# Patient Record
Sex: FEMALE | Race: WHITE | NOT HISPANIC OR LATINO | Employment: FULL TIME | URBAN - METROPOLITAN AREA
[De-identification: names, ages, dates, MRNs, and addresses within clinical notes are randomized per-mention and may not be internally consistent; named-entity substitution may affect disease eponyms.]

---

## 2019-02-26 ENCOUNTER — OFFICE VISIT (OUTPATIENT)
Dept: URGENT CARE | Facility: CLINIC | Age: 39
End: 2019-02-26
Payer: COMMERCIAL

## 2019-02-26 VITALS
RESPIRATION RATE: 19 BRPM | HEIGHT: 60 IN | OXYGEN SATURATION: 98 % | HEART RATE: 105 BPM | WEIGHT: 120 LBS | TEMPERATURE: 98 F | SYSTOLIC BLOOD PRESSURE: 133 MMHG | DIASTOLIC BLOOD PRESSURE: 68 MMHG | BODY MASS INDEX: 23.56 KG/M2

## 2019-02-26 DIAGNOSIS — J10.1 INFLUENZA A: Primary | ICD-10-CM

## 2019-02-26 LAB
CTP QC/QA: YES
FLUAV AG NPH QL: POSITIVE
FLUBV AG NPH QL: NEGATIVE

## 2019-02-26 PROCEDURE — 99203 PR OFFICE/OUTPT VISIT, NEW, LEVL III, 30-44 MIN: ICD-10-PCS | Mod: S$GLB,,, | Performed by: PHYSICIAN ASSISTANT

## 2019-02-26 PROCEDURE — 99203 OFFICE O/P NEW LOW 30 MIN: CPT | Mod: S$GLB,,, | Performed by: PHYSICIAN ASSISTANT

## 2019-02-26 PROCEDURE — 87804 INFLUENZA ASSAY W/OPTIC: CPT | Mod: QW,S$GLB,, | Performed by: PHYSICIAN ASSISTANT

## 2019-02-26 PROCEDURE — 87804 POCT INFLUENZA A/B: ICD-10-PCS | Mod: 59,QW,S$GLB, | Performed by: PHYSICIAN ASSISTANT

## 2019-02-26 RX ORDER — PROPRANOLOL HYDROCHLORIDE 120 MG/1
CAPSULE, EXTENDED RELEASE ORAL
Refills: 6 | COMMUNITY
Start: 2019-01-08 | End: 2024-01-22

## 2019-02-26 RX ORDER — OSELTAMIVIR PHOSPHATE 75 MG/1
75 CAPSULE ORAL 2 TIMES DAILY
Qty: 10 CAPSULE | Refills: 0 | Status: SHIPPED | OUTPATIENT
Start: 2019-02-26 | End: 2019-03-03

## 2019-02-26 RX ORDER — CARBAMAZEPINE 200 MG/1
TABLET, EXTENDED RELEASE ORAL
Refills: 6 | COMMUNITY
Start: 2019-01-03

## 2019-02-26 RX ORDER — ONDANSETRON HYDROCHLORIDE 8 MG/1
8 TABLET, FILM COATED ORAL EVERY 8 HOURS PRN
Qty: 15 TABLET | Refills: 0 | Status: SHIPPED | OUTPATIENT
Start: 2019-02-26 | End: 2022-03-06

## 2019-02-26 NOTE — PROGRESS NOTES
Subjective:       Patient ID: Marilu Frausto is a 38 y.o. female.    Vitals:  height is 5' (1.524 m) and weight is 54.4 kg (120 lb). Her oral temperature is 97.8 °F (36.6 °C). Her blood pressure is 133/68 and her pulse is 105. Her respiration is 19 and oxygen saturation is 98%.     Chief Complaint: URI (congestion, otalgia, fever, exposure to the flu)    URI    This is a new problem. The current episode started yesterday. The problem has been gradually worsening. The maximum temperature recorded prior to her arrival was 101 - 101.9 F. The fever has been present for 1 to 2 days. Associated symptoms include ear pain. Pertinent negatives include no chest pain, congestion, coughing, diarrhea, dysuria, headaches, nausea, rash, sore throat or vomiting. Treatments tried: Tylenol cold and flu. The treatment provided no relief.       Constitution: Positive for fatigue and fever. Negative for chills.   HENT: Positive for ear pain. Negative for congestion and sore throat.    Neck: Negative for painful lymph nodes.   Cardiovascular: Negative for chest pain and leg swelling.   Eyes: Negative for double vision and blurred vision.   Respiratory: Negative for cough and shortness of breath.    Gastrointestinal: Negative for nausea, vomiting and diarrhea.   Genitourinary: Negative for dysuria, frequency, urgency and history of kidney stones.   Musculoskeletal: Positive for muscle ache. Negative for joint pain, joint swelling and muscle cramps.   Skin: Negative for color change, pale, rash, erythema and bruising.   Allergic/Immunologic: Negative for seasonal allergies.   Neurological: Negative for dizziness, history of vertigo, light-headedness, passing out and headaches.   Hematologic/Lymphatic: Negative for swollen lymph nodes.   Psychiatric/Behavioral: Negative for nervous/anxious, sleep disturbance and depression. The patient is not nervous/anxious.        Objective:      Physical Exam   Constitutional: She is oriented to  person, place, and time. Vital signs are normal. She appears well-developed and well-nourished. She appears ill. No distress.   HENT:   Head: Normocephalic and atraumatic.   Right Ear: Hearing, external ear and ear canal normal. Tympanic membrane is bulging. A middle ear effusion (clear) is present.   Left Ear: Hearing, external ear and ear canal normal. Tympanic membrane is bulging. A middle ear effusion (clear) is present.   Nose: Mucosal edema present. Right sinus exhibits no maxillary sinus tenderness and no frontal sinus tenderness. Left sinus exhibits no maxillary sinus tenderness and no frontal sinus tenderness.   Mouth/Throat: Uvula is midline and oropharynx is clear and moist. No oropharyngeal exudate, posterior oropharyngeal edema or posterior oropharyngeal erythema.   Eyes: Conjunctivae, EOM and lids are normal. Right eye exhibits no discharge. Left eye exhibits no discharge.   Neck: Normal range of motion. Neck supple.   Cardiovascular: Normal rate, regular rhythm and normal heart sounds. Exam reveals no gallop and no friction rub.   No murmur heard.  Pulmonary/Chest: Effort normal and breath sounds normal. No stridor. No respiratory distress. She has no decreased breath sounds. She has no wheezes. She has no rhonchi. She has no rales.   Musculoskeletal: Normal range of motion.   Lymphadenopathy:        Head (right side): No submandibular and no tonsillar adenopathy present.        Head (left side): No submandibular and no tonsillar adenopathy present.   Neurological: She is alert and oriented to person, place, and time.   Skin: Skin is warm and dry. No rash noted. She is not diaphoretic. No erythema. No pallor.   Psychiatric: She has a normal mood and affect. Her behavior is normal.   Nursing note and vitals reviewed.      Assessment:       1. Influenza A        Office Visit on 02/26/2019   Component Date Value Ref Range Status    Rapid Influenza A Ag 02/26/2019 Positive* Negative Final    Rapid  Influenza B Ag 02/26/2019 Negative  Negative Final     Acceptable 02/26/2019 Yes   Final     Plan:         Influenza A  -     POCT Influenza A/B  -     oseltamivir (TAMIFLU) 75 MG capsule; Take 1 capsule (75 mg total) by mouth 2 (two) times daily. for 5 days  Dispense: 10 capsule; Refill: 0  -     ondansetron (ZOFRAN) 8 MG tablet; Take 1 tablet (8 mg total) by mouth every 8 (eight) hours as needed for Nausea.  Dispense: 15 tablet; Refill: 0      Patient Instructions   Take tamiflu as prescribed. Take zofran if you develop nausea.    Please follow up with your primary care provider within 2-5 days if your signs and symptoms have not resolved or worsen.     If your condition worsens or fails to improve we recommend that you receive another evaluation at the emergency room immediately or contact your primary medical clinic to discuss your concerns.   You must understand that you have received an Urgent Care treatment only and that you may be released before all of your medical problems are known or treated. You, the patient, will arrange for follow up care as instructed.       -Rest and stay hydrated.  -Tylenol every 4 hours OR ibuprofen every 6 hours as needed for pain/fever.  -Flonase OTC or Nasacort OTC for nasal congestion.  -Warm face compresses to help with facial sinus pain/pressure.  -Simple foods like chicken noodle soup.  -Delsym helps with coughing at night  -Zyrtec/Claritin during the day & Benadryl at night may help with allergies.    Please follow up with your primary care provider within 2-5 days if your signs and symptoms have not resolved or worsen.     If your condition worsens or fails to improve we recommend that you receive another evaluation at the emergency room immediately or contact your primary medical clinic to discuss your concerns.   You must understand that you have received an Urgent Care treatment only and that you may be released before all of your medical problems are  known or treated. You, the patient, will arrange for follow up care as instructed.         Influenza (Adult)    Influenza is also called the flu. It is a viral illness that affects the air passages of your lungs. It is different from the common cold. The flu can easily be passed from one to person to another. It may be spread through the air by coughing and sneezing. Or it can be spread by touching the sick person and then touching your own eyes, nose, or mouth.  The flu starts 1 to 3 days after you are exposed to the flu virus. It may last for 1 to 2 weeks but many people feel tired or fatigued for many weeks afterward. You usually dont need to take antibiotics unless you have a complication. This might be an ear or sinus infection or pneumonia.  Symptoms of the flu may be mild or severe. They can include extreme tiredness (wanting to stay in bed all day), chills, fevers, muscle aches, soreness with eye movement, headache, and a dry, hacking cough.  Home care  Follow these guidelines when caring for yourself at home:  · Avoid being around cigarette smoke, whether yours or other peoples.  · Acetaminophen or ibuprofen will help ease your fever, muscle aches, and headache. Dont give aspirin to anyone younger than 18 who has the flu. Aspirin can harm the liver.  · Nausea and loss of appetite are common with the flu. Eat light meals. Drink 6 to 8 glasses of liquids every day. Good choices are water, sport drinks, soft drinks without caffeine, juices, tea, and soup. Extra fluids will also help loosen secretions in your nose and lungs.  · Over-the-counter cold medicines will not make the flu go away faster. But the medicines may help with coughing, sore throat, and congestion in your nose and sinuses. Dont use a decongestant if you have high blood pressure.  · Stay home until your fever has been gone for at least 24 hours without using medicine to reduce fever.  Follow-up care  Follow up with your healthcare provider,  or as advised, if you are not getting better over the next week.  If you are age 65 or older, talk with your provider about getting a pneumococcal vaccine every 5 years. You should also get this vaccine if you have chronic asthma or COPD. All adults should get a flu vaccine every fall. Ask your provider about this.  When to seek medical advice  Call your healthcare provider right away if any of these occur:  · Cough with lots of colored mucus (sputum) or blood in your mucus  · Chest pain, shortness of breath, wheezing, or trouble breathing  · Severe headache, or face, neck, or ear pain  · New rash with fever  · Fever of 100.4°F (38°C) or higher, or as directed by your healthcare provider  · Confusion, behavior change, or seizure  · Severe weakness or dizziness  · You get a new fever or cough after getting better for a few days  Date Last Reviewed: 1/1/2017  © 2537-3840 [employer]. [address] [place] [address], [place] [address]. All rights reserved. This information is not intended as a substitute for professional medical care. Always follow your healthcare professional's instructions.

## 2019-02-26 NOTE — PATIENT INSTRUCTIONS
Take tamiflu as prescribed. Take zofran if you develop nausea.    Please follow up with your primary care provider within 2-5 days if your signs and symptoms have not resolved or worsen.     If your condition worsens or fails to improve we recommend that you receive another evaluation at the emergency room immediately or contact your primary medical clinic to discuss your concerns.   You must understand that you have received an Urgent Care treatment only and that you may be released before all of your medical problems are known or treated. You, the patient, will arrange for follow up care as instructed.       -Rest and stay hydrated.  -Tylenol every 4 hours OR ibuprofen every 6 hours as needed for pain/fever.  -Flonase OTC or Nasacort OTC for nasal congestion.  -Warm face compresses to help with facial sinus pain/pressure.  -Simple foods like chicken noodle soup.  -Delsym helps with coughing at night  -Zyrtec/Claritin during the day & Benadryl at night may help with allergies.    Please follow up with your primary care provider within 2-5 days if your signs and symptoms have not resolved or worsen.     If your condition worsens or fails to improve we recommend that you receive another evaluation at the emergency room immediately or contact your primary medical clinic to discuss your concerns.   You must understand that you have received an Urgent Care treatment only and that you may be released before all of your medical problems are known or treated. You, the patient, will arrange for follow up care as instructed.         Influenza (Adult)    Influenza is also called the flu. It is a viral illness that affects the air passages of your lungs. It is different from the common cold. The flu can easily be passed from one to person to another. It may be spread through the air by coughing and sneezing. Or it can be spread by touching the sick person and then touching your own eyes, nose, or mouth.  The flu starts 1 to 3  days after you are exposed to the flu virus. It may last for 1 to 2 weeks but many people feel tired or fatigued for many weeks afterward. You usually dont need to take antibiotics unless you have a complication. This might be an ear or sinus infection or pneumonia.  Symptoms of the flu may be mild or severe. They can include extreme tiredness (wanting to stay in bed all day), chills, fevers, muscle aches, soreness with eye movement, headache, and a dry, hacking cough.  Home care  Follow these guidelines when caring for yourself at home:  · Avoid being around cigarette smoke, whether yours or other peoples.  · Acetaminophen or ibuprofen will help ease your fever, muscle aches, and headache. Dont give aspirin to anyone younger than 18 who has the flu. Aspirin can harm the liver.  · Nausea and loss of appetite are common with the flu. Eat light meals. Drink 6 to 8 glasses of liquids every day. Good choices are water, sport drinks, soft drinks without caffeine, juices, tea, and soup. Extra fluids will also help loosen secretions in your nose and lungs.  · Over-the-counter cold medicines will not make the flu go away faster. But the medicines may help with coughing, sore throat, and congestion in your nose and sinuses. Dont use a decongestant if you have high blood pressure.  · Stay home until your fever has been gone for at least 24 hours without using medicine to reduce fever.  Follow-up care  Follow up with your healthcare provider, or as advised, if you are not getting better over the next week.  If you are age 65 or older, talk with your provider about getting a pneumococcal vaccine every 5 years. You should also get this vaccine if you have chronic asthma or COPD. All adults should get a flu vaccine every fall. Ask your provider about this.  When to seek medical advice  Call your healthcare provider right away if any of these occur:  · Cough with lots of colored mucus (sputum) or blood in your mucus  · Chest  pain, shortness of breath, wheezing, or trouble breathing  · Severe headache, or face, neck, or ear pain  · New rash with fever  · Fever of 100.4°F (38°C) or higher, or as directed by your healthcare provider  · Confusion, behavior change, or seizure  · Severe weakness or dizziness  · You get a new fever or cough after getting better for a few days  Date Last Reviewed: 1/1/2017  © 4737-0481 [employer]. [address] [place] [address]. All rights reserved. This information is not intended as a substitute for professional medical care. Always follow your healthcare professional's instructions.

## 2019-08-07 ENCOUNTER — OFFICE VISIT (OUTPATIENT)
Dept: URGENT CARE | Facility: CLINIC | Age: 39
End: 2019-08-07
Payer: COMMERCIAL

## 2019-08-07 VITALS
OXYGEN SATURATION: 97 % | HEART RATE: 69 BPM | DIASTOLIC BLOOD PRESSURE: 78 MMHG | SYSTOLIC BLOOD PRESSURE: 116 MMHG | WEIGHT: 111 LBS | BODY MASS INDEX: 21.68 KG/M2 | RESPIRATION RATE: 16 BRPM | TEMPERATURE: 98 F

## 2019-08-07 DIAGNOSIS — R05.9 COUGH: ICD-10-CM

## 2019-08-07 DIAGNOSIS — J06.9 ACUTE URI: Primary | ICD-10-CM

## 2019-08-07 PROCEDURE — 96372 PR INJECTION,THERAP/PROPH/DIAG2ST, IM OR SUBCUT: ICD-10-PCS | Mod: S$GLB,,, | Performed by: NURSE PRACTITIONER

## 2019-08-07 PROCEDURE — 99213 PR OFFICE/OUTPT VISIT, EST, LEVL III, 20-29 MIN: ICD-10-PCS | Mod: 25,S$GLB,, | Performed by: NURSE PRACTITIONER

## 2019-08-07 PROCEDURE — 96372 THER/PROPH/DIAG INJ SC/IM: CPT | Mod: S$GLB,,, | Performed by: NURSE PRACTITIONER

## 2019-08-07 PROCEDURE — 99213 OFFICE O/P EST LOW 20 MIN: CPT | Mod: 25,S$GLB,, | Performed by: NURSE PRACTITIONER

## 2019-08-07 RX ORDER — FLUTICASONE PROPIONATE 50 MCG
2 SPRAY, SUSPENSION (ML) NASAL DAILY
Qty: 1 BOTTLE | Refills: 0 | Status: SHIPPED | OUTPATIENT
Start: 2019-08-07 | End: 2022-06-17

## 2019-08-07 RX ORDER — GUAIFENESIN 600 MG/1
600 TABLET, EXTENDED RELEASE ORAL 2 TIMES DAILY
Qty: 60 TABLET | Refills: 0 | Status: SHIPPED | OUTPATIENT
Start: 2019-08-07 | End: 2020-08-06

## 2019-08-07 RX ORDER — BETAMETHASONE SODIUM PHOSPHATE AND BETAMETHASONE ACETATE 3; 3 MG/ML; MG/ML
9 INJECTION, SUSPENSION INTRA-ARTICULAR; INTRALESIONAL; INTRAMUSCULAR; SOFT TISSUE
Status: COMPLETED | OUTPATIENT
Start: 2019-08-07 | End: 2019-08-07

## 2019-08-07 RX ORDER — PREDNISONE 20 MG/1
20 TABLET ORAL DAILY
Qty: 4 TABLET | Refills: 0 | Status: SHIPPED | OUTPATIENT
Start: 2019-08-08 | End: 2019-08-12

## 2019-08-07 RX ORDER — PROMETHAZINE HYDROCHLORIDE AND DEXTROMETHORPHAN HYDROBROMIDE 6.25; 15 MG/5ML; MG/5ML
5 SYRUP ORAL
Qty: 180 ML | Refills: 0 | Status: SHIPPED | OUTPATIENT
Start: 2019-08-07 | End: 2019-08-12

## 2019-08-07 RX ADMIN — BETAMETHASONE SODIUM PHOSPHATE AND BETAMETHASONE ACETATE 9 MG: 3; 3 INJECTION, SUSPENSION INTRA-ARTICULAR; INTRALESIONAL; INTRAMUSCULAR; SOFT TISSUE at 04:08

## 2019-08-07 NOTE — PATIENT INSTRUCTIONS
Please follow up with your Primary care provider within 2-5 days if your signs and symptoms have not resolved or worsen.     If your condition worsens or fails to improve we recommend that you receive another evaluation at the emergency room immediately or contact your primary medical clinic to discuss your concerns.   You must understand that you have received an Urgent Care treatment only and that you may be released before all of your medical problems are known or treated. You, the patient, will arrange for follow up care as instructed.     RED FLAGS/WARNING SYMPTOMS DISCUSSED WITH PATIENT THAT WOULD WARRANT EMERGENT MEDICAL ATTENTION. PATIENT VERBALIZED UNDERSTANDING.       Viral Upper Respiratory Illness with Wheezing (Adult)  You have a viral upper respiratory illness (URI), which is another term for the common cold. When the infection causes a lot of irritation, the air passages can go into spasm. This causes wheezing and shortness of breath.    This illness is contagious during the first few days. It is spread through the air by coughing and sneezing. It may also be spread by direct contact (touching the sick person and then touching your own eyes, nose, or mouth). Frequent handwashing will decrease the risk.  Most viral illnesses go away within 7 to 10 days with rest and simple home remedies. Sometimes the illness may last for several weeks. Antibiotics will not kill a virus, and they are generally not prescribed for this condition.  Home care  · If symptoms are severe, rest at home for the first 2 to 3 days. When you resume activity, don't let yourself get too tired.  · Avoid being exposed to cigarette smoke (yours or others).  · You may use acetaminophen or ibuprofen to control pain and fever, unless another medicine was prescribed. (Note: If you have chronic liver or kidney disease, have ever had a stomach ulcer or gastrointestinal bleeding, or are taking blood-thinning medicines, talk with your  healthcare provider before using these medicines.) Aspirin should never be given to anyone under 18 years of age who is ill with a viral infection or fever. It may cause severe liver or brain damage.  · Your appetite may be poor, so a light diet is fine. Avoid dehydration by drinking 6 to 8 glasses of fluids per day (water, soft drinks, juices, tea, or soup). Extra fluids will help loosen secretions in the nose and lungs.  · Over-the-counter cold medicines will not shorten the length of time youre sick, but they may be helpful for the following symptoms: cough, sore throat, and nasal and sinus congestion. (Note: Do not use decongestants if you have high blood pressure.)  Follow-up care  Follow up with your healthcare provider, or as advised.  When to seek medical advice  Call your healthcare provider right away if any of these occur:  · Cough with lots of colored sputum (mucus)  · Severe headache; face, neck, or ear pain  · Difficulty swallowing due to throat pain  · Fever of 100.4ºF (38ºC) or higher, or as directed by your healthcare provider  Call 911, or get immediate medical care  Call emergency services right away if any of these occur:  · Chest pain, shortness of breath, worsening wheezing, or difficulty breathing  · Coughing up blood  · Inability to swallow due to throat pain  Date Last Reviewed: 9/13/2015 © 2000-2017 [employer]. [address] [place] [id number]. All rights reserved. This information is not intended as a substitute for professional medical care. Always follow your healthcare professional's instructions.

## 2019-08-07 NOTE — PROGRESS NOTES
Subjective:       Patient ID: Marilu Frausto is a 39 y.o. female.    Vitals:  weight is 50.3 kg (111 lb). Her temperature is 97.9 °F (36.6 °C). Her blood pressure is 116/78 and her pulse is 69. Her respiration is 16 and oxygen saturation is 97%.     Chief Complaint: Sinus Problem    Started about 2 days ago    Sinus Problem   This is a new problem. The current episode started in the past 7 days. The problem has been rapidly improving since onset. Maximum temperature: felt feveris off and on. Her pain is at a severity of 4/10. Associated symptoms include congestion, coughing, ear pain, sinus pressure and a sore throat. Pertinent negatives include no chills, diaphoresis or shortness of breath. Past treatments include acetaminophen and oral decongestants. The treatment provided mild relief.       Constitution: Positive for fever. Negative for chills, sweating and fatigue.   HENT: Positive for ear pain, congestion, sinus pain, sinus pressure and sore throat. Negative for voice change.    Neck: Negative for painful lymph nodes.   Eyes: Negative for eye redness.   Respiratory: Positive for chest tightness, cough and sputum production. Negative for bloody sputum, COPD, shortness of breath, stridor, wheezing and asthma.    Gastrointestinal: Negative for nausea and vomiting.   Musculoskeletal: Negative for muscle ache.   Skin: Negative for rash.   Allergic/Immunologic: Negative for seasonal allergies and asthma.   Hematologic/Lymphatic: Negative for swollen lymph nodes.       Objective:      Physical Exam   Constitutional: She is oriented to person, place, and time. She appears well-developed and well-nourished. She is cooperative.  Non-toxic appearance. She does not appear ill. No distress.   HENT:   Head: Normocephalic and atraumatic.   Right Ear: Hearing, tympanic membrane, external ear and ear canal normal.   Left Ear: Hearing, tympanic membrane, external ear and ear canal normal.   Nose: Rhinorrhea and sinus  tenderness present. No mucosal edema or nasal deformity. No epistaxis. Right sinus exhibits no maxillary sinus tenderness and no frontal sinus tenderness. Left sinus exhibits no maxillary sinus tenderness and no frontal sinus tenderness.   Mouth/Throat: Uvula is midline and mucous membranes are normal. No trismus in the jaw. Normal dentition. No uvula swelling. Posterior oropharyngeal edema and posterior oropharyngeal erythema present. No oropharyngeal exudate.   Eyes: Conjunctivae and lids are normal. Right eye exhibits no discharge. Left eye exhibits no discharge. No scleral icterus.   Sclera clear bilat   Neck: Trachea normal, normal range of motion, full passive range of motion without pain and phonation normal. Neck supple.   Cardiovascular: Normal rate, regular rhythm, normal heart sounds, intact distal pulses and normal pulses.   Pulmonary/Chest: Effort normal and breath sounds normal. No respiratory distress.   Abdominal: Soft. Normal appearance and bowel sounds are normal. She exhibits no distension, no pulsatile midline mass and no mass. There is no tenderness.   Musculoskeletal: Normal range of motion. She exhibits no edema or deformity.   Neurological: She is alert and oriented to person, place, and time. She exhibits normal muscle tone. Coordination normal.   Skin: Skin is warm, dry and intact. She is not diaphoretic. No pallor.   Psychiatric: She has a normal mood and affect. Her speech is normal and behavior is normal. Judgment and thought content normal. Cognition and memory are normal.   Nursing note and vitals reviewed.      Assessment:       1. Acute URI    2. Cough        Plan:         Acute URI  -     betamethasone acetate-betamethasone sodium phosphate injection 9 mg  -     predniSONE (DELTASONE) 20 MG tablet; Take 1 tablet (20 mg total) by mouth once daily. for 4 days  Dispense: 4 tablet; Refill: 0  -     fluticasone propionate (FLONASE) 50 mcg/actuation nasal spray; 2 sprays (100 mcg total)  by Each Nostril route once daily.  Dispense: 1 Bottle; Refill: 0  -     guaiFENesin (MUCINEX) 600 mg 12 hr tablet; Take 1 tablet (600 mg total) by mouth 2 (two) times daily.  Dispense: 60 tablet; Refill: 0    Cough  -     betamethasone acetate-betamethasone sodium phosphate injection 9 mg  -     predniSONE (DELTASONE) 20 MG tablet; Take 1 tablet (20 mg total) by mouth once daily. for 4 days  Dispense: 4 tablet; Refill: 0  -     promethazine-dextromethorphan (PROMETHAZINE-DM) 6.25-15 mg/5 mL Syrp; Take 5 mLs by mouth every 4 to 6 hours as needed.  Dispense: 180 mL; Refill: 0        Please follow up with your Primary care provider within 2-5 days if your signs and symptoms have not resolved or worsen.     If your condition worsens or fails to improve we recommend that you receive another evaluation at the emergency room immediately or contact your primary medical clinic to discuss your concerns.   You must understand that you have received an Urgent Care treatment only and that you may be released before all of your medical problems are known or treated. You, the patient, will arrange for follow up care as instructed.     RED FLAGS/WARNING SYMPTOMS DISCUSSED WITH PATIENT THAT WOULD WARRANT EMERGENT MEDICAL ATTENTION. PATIENT VERBALIZED UNDERSTANDING.       Viral Upper Respiratory Illness with Wheezing (Adult)  You have a viral upper respiratory illness (URI), which is another term for the common cold. When the infection causes a lot of irritation, the air passages can go into spasm. This causes wheezing and shortness of breath.    This illness is contagious during the first few days. It is spread through the air by coughing and sneezing. It may also be spread by direct contact (touching the sick person and then touching your own eyes, nose, or mouth). Frequent handwashing will decrease the risk.  Most viral illnesses go away within 7 to 10 days with rest and simple home remedies. Sometimes the illness may last for  several weeks. Antibiotics will not kill a virus, and they are generally not prescribed for this condition.  Home care  · If symptoms are severe, rest at home for the first 2 to 3 days. When you resume activity, don't let yourself get too tired.  · Avoid being exposed to cigarette smoke (yours or others).  · You may use acetaminophen or ibuprofen to control pain and fever, unless another medicine was prescribed. (Note: If you have chronic liver or kidney disease, have ever had a stomach ulcer or gastrointestinal bleeding, or are taking blood-thinning medicines, talk with your healthcare provider before using these medicines.) Aspirin should never be given to anyone under 18 years of age who is ill with a viral infection or fever. It may cause severe liver or brain damage.  · Your appetite may be poor, so a light diet is fine. Avoid dehydration by drinking 6 to 8 glasses of fluids per day (water, soft drinks, juices, tea, or soup). Extra fluids will help loosen secretions in the nose and lungs.  · Over-the-counter cold medicines will not shorten the length of time youre sick, but they may be helpful for the following symptoms: cough, sore throat, and nasal and sinus congestion. (Note: Do not use decongestants if you have high blood pressure.)  Follow-up care  Follow up with your healthcare provider, or as advised.  When to seek medical advice  Call your healthcare provider right away if any of these occur:  · Cough with lots of colored sputum (mucus)  · Severe headache; face, neck, or ear pain  · Difficulty swallowing due to throat pain  · Fever of 100.4ºF (38ºC) or higher, or as directed by your healthcare provider  Call 911, or get immediate medical care  Call emergency services right away if any of these occur:  · Chest pain, shortness of breath, worsening wheezing, or difficulty breathing  · Coughing up blood  · Inability to swallow due to throat pain  Date Last Reviewed: 9/13/2015  © 9190-9684 [employer] [address] [place] [address], [place] [address]. All rights reserved. This information is not intended as a substitute for professional medical care. Always follow your healthcare professional's instructions.

## 2019-09-13 ENCOUNTER — HOSPITAL ENCOUNTER (EMERGENCY)
Facility: HOSPITAL | Age: 39
Discharge: HOME | End: 2019-09-13
Attending: EMERGENCY MEDICINE
Payer: COMMERCIAL

## 2019-09-13 VITALS
WEIGHT: 111 LBS | HEART RATE: 73 BPM | TEMPERATURE: 98 F | RESPIRATION RATE: 19 BRPM | OXYGEN SATURATION: 99 % | DIASTOLIC BLOOD PRESSURE: 81 MMHG | BODY MASS INDEX: 21.79 KG/M2 | HEIGHT: 60 IN | SYSTOLIC BLOOD PRESSURE: 124 MMHG

## 2019-09-13 DIAGNOSIS — R51.9 NONINTRACTABLE HEADACHE, UNSPECIFIED CHRONICITY PATTERN, UNSPECIFIED HEADACHE TYPE: Primary | ICD-10-CM

## 2019-09-13 LAB
ANION GAP SERPL CALC-SCNC: 10 MMOL/L (ref 8–16)
B-HCG UR QL: NEGATIVE
BASOPHILS # BLD AUTO: 0.04 K/UL (ref 0–0.2)
BASOPHILS NFR BLD: 0.7 % (ref 0–1.9)
BUN SERPL-MCNC: 8 MG/DL (ref 6–20)
CALCIUM SERPL-MCNC: 9.5 MG/DL (ref 8.7–10.5)
CHLORIDE SERPL-SCNC: 103 MMOL/L (ref 95–110)
CO2 SERPL-SCNC: 25 MMOL/L (ref 23–29)
CREAT SERPL-MCNC: 0.7 MG/DL (ref 0.5–1.4)
CTP QC/QA: YES
DIFFERENTIAL METHOD: ABNORMAL
EOSINOPHIL # BLD AUTO: 0.1 K/UL (ref 0–0.5)
EOSINOPHIL NFR BLD: 1.7 % (ref 0–8)
ERYTHROCYTE [DISTWIDTH] IN BLOOD BY AUTOMATED COUNT: 14.5 % (ref 11.5–14.5)
EST. GFR  (AFRICAN AMERICAN): >60 ML/MIN/1.73 M^2
EST. GFR  (NON AFRICAN AMERICAN): >60 ML/MIN/1.73 M^2
GLUCOSE SERPL-MCNC: 93 MG/DL (ref 70–110)
HCT VFR BLD AUTO: 38.1 % (ref 37–48.5)
HGB BLD-MCNC: 12 G/DL (ref 12–16)
LYMPHOCYTES # BLD AUTO: 2.3 K/UL (ref 1–4.8)
LYMPHOCYTES NFR BLD: 38.9 % (ref 18–48)
MCH RBC QN AUTO: 27.1 PG (ref 27–31)
MCHC RBC AUTO-ENTMCNC: 31.5 G/DL (ref 32–36)
MCV RBC AUTO: 86 FL (ref 82–98)
MONOCYTES # BLD AUTO: 0.4 K/UL (ref 0.3–1)
MONOCYTES NFR BLD: 6.5 % (ref 4–15)
NEUTROPHILS # BLD AUTO: 3.1 K/UL (ref 1.8–7.7)
NEUTROPHILS NFR BLD: 52.2 % (ref 38–73)
PLATELET # BLD AUTO: 459 K/UL (ref 150–350)
PMV BLD AUTO: 8.2 FL (ref 9.2–12.9)
POTASSIUM SERPL-SCNC: 3.5 MMOL/L (ref 3.5–5.1)
RBC # BLD AUTO: 4.43 M/UL (ref 4–5.4)
SODIUM SERPL-SCNC: 138 MMOL/L (ref 136–145)
WBC # BLD AUTO: 5.96 K/UL (ref 3.9–12.7)

## 2019-09-13 PROCEDURE — 96374 THER/PROPH/DIAG INJ IV PUSH: CPT

## 2019-09-13 PROCEDURE — 96361 HYDRATE IV INFUSION ADD-ON: CPT

## 2019-09-13 PROCEDURE — 99284 EMERGENCY DEPT VISIT MOD MDM: CPT | Mod: 25

## 2019-09-13 PROCEDURE — 96375 TX/PRO/DX INJ NEW DRUG ADDON: CPT

## 2019-09-13 PROCEDURE — 80048 BASIC METABOLIC PNL TOTAL CA: CPT

## 2019-09-13 PROCEDURE — 63600175 PHARM REV CODE 636 W HCPCS: Performed by: NURSE PRACTITIONER

## 2019-09-13 PROCEDURE — 81025 URINE PREGNANCY TEST: CPT | Performed by: NURSE PRACTITIONER

## 2019-09-13 PROCEDURE — 85025 COMPLETE CBC W/AUTO DIFF WBC: CPT

## 2019-09-13 RX ORDER — PROCHLORPERAZINE EDISYLATE 5 MG/ML
10 INJECTION INTRAMUSCULAR; INTRAVENOUS ONCE
Status: COMPLETED | OUTPATIENT
Start: 2019-09-13 | End: 2019-09-13

## 2019-09-13 RX ORDER — DIPHENHYDRAMINE HYDROCHLORIDE 50 MG/ML
25 INJECTION INTRAMUSCULAR; INTRAVENOUS
Status: COMPLETED | OUTPATIENT
Start: 2019-09-13 | End: 2019-09-13

## 2019-09-13 RX ORDER — KETOROLAC TROMETHAMINE 30 MG/ML
15 INJECTION, SOLUTION INTRAMUSCULAR; INTRAVENOUS
Status: COMPLETED | OUTPATIENT
Start: 2019-09-13 | End: 2019-09-13

## 2019-09-13 RX ADMIN — KETOROLAC TROMETHAMINE 15 MG: 30 INJECTION, SOLUTION INTRAMUSCULAR at 02:09

## 2019-09-13 RX ADMIN — SODIUM CHLORIDE 1000 ML: 0.9 INJECTION, SOLUTION INTRAVENOUS at 01:09

## 2019-09-13 RX ADMIN — DIPHENHYDRAMINE HYDROCHLORIDE 25 MG: 50 INJECTION, SOLUTION INTRAMUSCULAR; INTRAVENOUS at 02:09

## 2019-09-13 RX ADMIN — PROCHLORPERAZINE EDISYLATE 10 MG: 5 INJECTION INTRAMUSCULAR; INTRAVENOUS at 02:09

## 2019-09-13 NOTE — DISCHARGE INSTRUCTIONS
Increase oral hydration get plenty of rest.  Take prescribed Fioricet as labeled as needed.  Follow up with PCP or [facility] in 2 3 days return to ED for any concerns or worsening symptoms.

## 2019-09-13 NOTE — ED PROVIDER NOTES
"Encounter Date: 2019       History     Chief Complaint   Patient presents with    Migraine     PT presents to ED today c/o miraine x 7 days and unable to eat c/o pain when chewing      39-year-old female presents ED for evaluation of intermittent headache x7 days.  Patient reports history of migraines and states that this headache feels like her typical migraines but states that this one is "lasting longer." Patient's neurologist is Dr. Silva and she is currently taking Fioricet for migraines.  Denies taking any Fioricet today for headache.  Denies injury or trauma.  Associates photophobia and blurry vision. Denies any alleviating or exacerbating factors.  Denies fever, chills, neck pain/stiffness, dizziness, lightheadedness, SOB, chest pain, nausea, vomiting, or any other concerns.    The history is provided by the patient.     Review of patient's allergies indicates:  No Known Allergies  Past Medical History:   Diagnosis Date    Epilepsy     Migraines      Past Surgical History:   Procedure Laterality Date     SECTION       History reviewed. No pertinent family history.  Social History     Tobacco Use    Smoking status: Current Every Day Smoker     Types: Cigarettes    Smokeless tobacco: Never Used   Substance Use Topics    Alcohol use: Not Currently    Drug use: Not on file     Review of Systems   Constitutional: Negative for chills and fever.   Eyes: Positive for photophobia and visual disturbance.   Respiratory: Negative for shortness of breath.    Cardiovascular: Negative for chest pain.   Gastrointestinal: Negative for nausea and vomiting.   Musculoskeletal: Negative for back pain, neck pain and neck stiffness.   Neurological: Positive for headaches. Negative for dizziness, seizures, syncope, facial asymmetry, speech difficulty, weakness and light-headedness.   Hematological: Does not bruise/bleed easily.   All other systems reviewed and are negative.      Physical Exam     Initial " "Vitals [09/13/19 1219]   BP Pulse Resp Temp SpO2   (!) 148/77 81 19 97.8 °F (36.6 °C) 99 %      MAP       --         Physical Exam    Vitals reviewed.  Constitutional: She appears well-developed and well-nourished.  Non-toxic appearance. She does not have a sickly appearance.   HENT:   Head: Atraumatic.   Mouth/Throat: Oropharynx is clear and moist.   Eyes: EOM are normal.   Neck: Normal range of motion, full passive range of motion without pain and phonation normal. Neck supple.   No meningismus.   Cardiovascular: Regular rhythm.   Pulmonary/Chest: No respiratory distress.   Abdominal: Soft.   Neurological: She is alert and oriented to person, place, and time. She has normal strength. No cranial nerve deficit or sensory deficit. Coordination and gait normal.   No focal neurological deficits.   Skin: Skin is warm. No rash noted.   Psychiatric: She has a normal mood and affect.         ED Course   Procedures  Labs Reviewed   CBC W/ AUTO DIFFERENTIAL - Abnormal; Notable for the following components:       Result Value    Mean Corpuscular Hemoglobin Conc 31.5 (*)     Platelets 459 (*)     MPV 8.2 (*)     All other components within normal limits   BASIC METABOLIC PANEL   POCT URINE PREGNANCY          Imaging Results    None          Medical Decision Making:   History:   Old Medical Records: I decided to obtain old medical records.  Initial Assessment:   39-year-old female presents ED for evaluation of intermittent headache x7 days.  Patient reports history of migraines and states that this headache feels like her typical migraines but states that this one is "lasting longer."  Appears well, nontoxic.  Afebrile. VSS.  No meningismus.  No focal neurological deficits.    Clinical Tests:   Lab Tests: Reviewed and Ordered  ED Management:  UPT, labs, IV toradol, benadryl, compazine   UPT negative. I feel the patient's headache is benign.  The headache has pretty much resolved with medications given in ED.  No neck stiffness, " "vision changes, fever, rash, or meningismus to suggest pseudomotor cerebri or meningitis.  No pain over temporal arteries or vision changes to suggest temporal arteritis.  No "thunderclap" onset or neck stiffness to suggest spontaneous SAH/ICH.  No lacinated pain to eyes with tearing to suggest cluster headache. No "band-like" to suggest tension headache. No sinus pressure or nasal congestion to suggest sinus headache.  Patient is hemodynamically stable and will be DC home.  Instructed to follow up with PCP or [facility] in 2-3 days and to return to ED for any concerns or worsening symptoms.  Patient verbalizes understanding, compliance, and agreement with treatment plan.  Other:   I discussed test(s) with the performing physician.       <> Summary of the Findings: Care of this patient discussed with Dr. Verdugo who agrees with ED course and disposition.                    ED Course as of Sep 13 1542   Fri Sep 13, 2019   1542 3:42 PM- Patient reports improvement in pain.      [CH]      ED Course User Index  [CH] Camron Booth NP     Clinical Impression:       ICD-10-CM ICD-9-CM   1. Nonintractable headache, unspecified chronicity pattern, unspecified headache type R51 784.0                                Camron Booth NP  09/13/19 1607    "

## 2019-09-13 NOTE — ED PROVIDER NOTES
Sort note: Marilu Frausto nontoxic/afebrile 39 y.o.  presented to the ED with c/o headache. History of migraine but states this is lasting longer than usual.       Patient seen and medically screened by Nurse practitioner in Sort process due to ED crowding.  Appropriate tests and/or medications ordered.  Care transferred to an alternate provider when patient was placed in an Exam Room from the [facility] for physical exam, additional orders, and disposition. 12:55 PM. MARISOL Campbell, [place]  09/13/19 1256

## 2020-01-29 ENCOUNTER — OFFICE VISIT (OUTPATIENT)
Dept: URGENT CARE | Facility: CLINIC | Age: 40
End: 2020-01-29
Payer: COMMERCIAL

## 2020-01-29 VITALS
BODY MASS INDEX: 23.56 KG/M2 | OXYGEN SATURATION: 96 % | RESPIRATION RATE: 18 BRPM | HEART RATE: 88 BPM | WEIGHT: 120 LBS | DIASTOLIC BLOOD PRESSURE: 92 MMHG | TEMPERATURE: 100 F | HEIGHT: 60 IN | SYSTOLIC BLOOD PRESSURE: 142 MMHG

## 2020-01-29 DIAGNOSIS — R50.9 FEVER, UNSPECIFIED FEVER CAUSE: ICD-10-CM

## 2020-01-29 DIAGNOSIS — R06.2 WHEEZING: ICD-10-CM

## 2020-01-29 DIAGNOSIS — J20.9 ACUTE PURULENT BRONCHITIS: Primary | ICD-10-CM

## 2020-01-29 LAB
CTP QC/QA: YES
FLUAV AG NPH QL: NEGATIVE
FLUBV AG NPH QL: NEGATIVE

## 2020-01-29 PROCEDURE — 99214 PR OFFICE/OUTPT VISIT, EST, LEVL IV, 30-39 MIN: ICD-10-PCS | Mod: 25,S$GLB,, | Performed by: PHYSICIAN ASSISTANT

## 2020-01-29 PROCEDURE — 71046 X-RAY EXAM CHEST 2 VIEWS: CPT | Mod: FY,S$GLB,, | Performed by: RADIOLOGY

## 2020-01-29 PROCEDURE — 99214 OFFICE O/P EST MOD 30 MIN: CPT | Mod: 25,S$GLB,, | Performed by: PHYSICIAN ASSISTANT

## 2020-01-29 PROCEDURE — 87804 INFLUENZA ASSAY W/OPTIC: CPT | Mod: QW,S$GLB,, | Performed by: PHYSICIAN ASSISTANT

## 2020-01-29 PROCEDURE — 94640 AIRWAY INHALATION TREATMENT: CPT | Mod: S$GLB,,, | Performed by: PHYSICIAN ASSISTANT

## 2020-01-29 PROCEDURE — 94640 PR INHAL RX, AIRWAY OBST/DX SPUTUM INDUCT: ICD-10-PCS | Mod: S$GLB,,, | Performed by: PHYSICIAN ASSISTANT

## 2020-01-29 PROCEDURE — 87804 POCT INFLUENZA A/B: ICD-10-PCS | Mod: QW,S$GLB,, | Performed by: PHYSICIAN ASSISTANT

## 2020-01-29 PROCEDURE — 71046 XR CHEST PA AND LATERAL: ICD-10-PCS | Mod: FY,S$GLB,, | Performed by: RADIOLOGY

## 2020-01-29 RX ORDER — PROMETHAZINE HYDROCHLORIDE AND DEXTROMETHORPHAN HYDROBROMIDE 6.25; 15 MG/5ML; MG/5ML
5 SYRUP ORAL 3 TIMES DAILY PRN
Qty: 180 ML | Refills: 0 | Status: SHIPPED | OUTPATIENT
Start: 2020-01-29 | End: 2020-02-08

## 2020-01-29 RX ORDER — DOXYCYCLINE 100 MG/1
100 CAPSULE ORAL 2 TIMES DAILY
Qty: 20 CAPSULE | Refills: 0 | Status: SHIPPED | OUTPATIENT
Start: 2020-01-29 | End: 2020-02-08

## 2020-01-29 RX ORDER — METHYLPREDNISOLONE 4 MG/1
TABLET ORAL
Qty: 1 PACKAGE | Refills: 0 | Status: SHIPPED | OUTPATIENT
Start: 2020-01-29 | End: 2022-03-06

## 2020-01-29 RX ORDER — ALBUTEROL SULFATE 90 UG/1
2 AEROSOL, METERED RESPIRATORY (INHALATION) EVERY 4 HOURS PRN
Qty: 6.7 G | Refills: 0 | Status: SHIPPED | OUTPATIENT
Start: 2020-01-29 | End: 2022-03-06

## 2020-01-29 RX ORDER — FREMANEZUMAB-VFRM 225 MG/1.5ML
INJECTION SUBCUTANEOUS
COMMUNITY
Start: 2020-01-16 | End: 2023-01-03 | Stop reason: ALTCHOICE

## 2020-01-29 RX ORDER — IPRATROPIUM BROMIDE 0.5 MG/2.5ML
0.5 SOLUTION RESPIRATORY (INHALATION)
Status: COMPLETED | OUTPATIENT
Start: 2020-01-29 | End: 2020-01-29

## 2020-01-29 RX ORDER — MEDROXYPROGESTERONE ACETATE 150 MG/ML
INJECTION, SUSPENSION INTRAMUSCULAR
COMMUNITY
Start: 2020-01-08 | End: 2022-03-06

## 2020-01-29 RX ORDER — ALBUTEROL SULFATE 0.83 MG/ML
2.5 SOLUTION RESPIRATORY (INHALATION)
Status: COMPLETED | OUTPATIENT
Start: 2020-01-29 | End: 2020-01-29

## 2020-01-29 RX ADMIN — ALBUTEROL SULFATE 2.5 MG: 0.83 SOLUTION RESPIRATORY (INHALATION) at 09:01

## 2020-01-29 RX ADMIN — IPRATROPIUM BROMIDE 0.5 MG: 0.5 SOLUTION RESPIRATORY (INHALATION) at 09:01

## 2020-01-29 NOTE — PATIENT INSTRUCTIONS
If your condition worsens or fails to improve we recommend that you receive another evaluation at the ER immediately or contact your PCP to discuss your concerns or return here. You must understand that you've received an urgent care treatment only and that you may be released before all your medical problems are known or treated. You the patient will arrange for followup care as instructed.    Rest and fluids are important  Can use honey with nik to soothe your throat    Take full course of antibiotics as directed.    You received a steroid prescription today -  this can elevate your blood pressure, elevate your blood sugar, water weight gain, nervous energy, redness to the face.    Take inhaler as prescribed and needed for wheezing  Take prescription cough medication as prescribed as needed for cough  Take mucinex as directed to help with chest congestion    -  Flonase (fluticasone) is a nasal spray which is available over the counter and may help with symptoms of nasal congestion.     -  Tylenol or ibuprofen can also be used as directed for pain unless you have an allergy to them or medical condition such as stomach ulcers, kidney or liver disease or blood thinners etc for which you should not be taking these type of medications.     Please follow up with your primary care doctor or specialist in the next 48-72hrs as needed and if no improvement.    If you  smoke, please stop smoking.        What Is Acute Bronchitis?  Acute bronchitis is when the airways in your lungs (bronchial tubes) become red and swollen (inflamed). It is usually caused by a viral infection. But it can also occur because of a bacteria or allergen. Symptoms include a cough that produces yellow or greenish mucus and can last for days or sometimes weeks.  Inside healthy lungs    Air travels in and out of the lungs through the airways. The linings of these airways produce sticky mucus. This mucus traps particles that enter the lungs. Tiny  structures called cilia then sweep the particles out of the airways.     Healthy airway: Airways are normally open. Air moves in and out easily.      Healthy cilia: Tiny, hairlike cilia sweep mucus and particles up and out of the airways.   Lungs with bronchitis  Bronchitis often occurs with a cold or the flu virus. The airways become inflamed (red and swollen). There is a deep hacking cough from the extra mucus. Other symptoms may include:  · Wheezing  · Chest discomfort  · Shortness of breath  · Mild fever  A second infection, this time due to bacteria, may then occur. And airways irritated by allergens or smoke are more likely to get infected.        Inflamed airway: Inflammation and extra mucus narrow the airway, causing shortness of breath.      Impaired cilia: Extra mucus impairs cilia, causing congestion and wheezing. Smoking makes the problem worse.   Making a diagnosis  A physical exam, health history, and certain tests help your healthcare provider make the diagnosis.  Health history  Your healthcare provider will ask you about your symptoms.  The exam  Your provider listens to your chest for signs of congestion. He or she may also check your ears, nose, and throat.  Possible tests  · A sputum test for bacteria. This requires a sample of mucus from your lungs.  · A nasal or throat swab. This tests to see if you have a bacterial infection.  · A chest X-ray. This is done if your healthcare provider thinks you have pneumonia.  · Tests to check for an underlying condition. Other tests may be done to check for things such as allergies, asthma, or COPD (chronic obstructive pulmonary disease). You may need to see a specialist for more lung function testing.  Treating a cough  The main treatment for bronchitis is easing symptoms. Avoiding smoke, allergens, and other things that trigger coughing can often help. If the infection is bacterial, you may be given antibiotics. During the illness, it's important to get  plenty of sleep. To ease symptoms:  · Dont smoke. Also avoid secondhand smoke.  · Use a humidifier. Or try breathing in steam from a hot shower. This may help loosen mucus.  · Drink a lot of water and juice. They can soothe the throat and may help thin mucus.  · Sit up or use extra pillows when in bed. This helps to lessen coughing and congestion.  · Ask your provider about using medicine. Ask about using cough medicine, pain and fever medicine, or a decongestant.  Antibiotics  Most cases of bronchitis are caused by cold or flu viruses. They dont need antibiotics to treat them, even if your mucus is thick and green or yellow. Antibiotics dont treat viral illness and antibiotics have not been shown to have any benefit in cases of acute bronchitis. Taking antibiotics when they are not needed increases your risk of getting an infection later that is antibiotic-resistant. Antibiotics can also cause severe cases of diarrhea that require other antibiotics to treat.  It is important that you accept your healthcare provider's opinion to not use antibiotics. Your provider will prescribe antibiotics if the infection is caused by bacteria. If they are prescribed:  · Take all of the medicine. Take the medicine until it is used up, even if symptoms have improved. If you dont, the bronchitis may come back.  · Take the medicines as directed. For instance, some medicines should be taken with food.  · Ask about side effects. Ask your provider or pharmacist what side effects are common, and what to do about them.  Follow-up care  You should see your provider again in 2 to 3 weeks. By this time, symptoms should have improved. An infection that lasts longer may mean you have a more serious problem.  Prevention  · Avoid tobacco smoke. If you smoke, quit. Stay away from smoky places. Ask friends and family not to smoke around you, or in your home or car.  · Get checked for allergies.  · Ask your provider about getting a yearly flu  shot. Also ask about pneumococcal or pneumonia shots.  · Wash your hands often. This helps reduce the chance of picking up viruses that cause colds and flu.  Call your healthcare provider if:  · Symptoms worsen, or you have new symptoms  · Breathing problems worsen or  become severe  · Symptoms dont get better within a week, or within 3 days of taking antibiotics   Date Last Reviewed: 2/1/2017  © 4227-8994 [employer]. [address] [place] [address], [place] [address]. All rights reserved. This information is not intended as a substitute for professional medical care. Always follow your healthcare professional's instructions.

## 2020-01-29 NOTE — PROGRESS NOTES
Subjective:       Patient ID: Marilu Frausto is a 39 y.o. female.    Vitals:  height is 5' (1.524 m) and weight is 54.4 kg (120 lb). Her temperature is 100.2 °F (37.9 °C). Her blood pressure is 142/92 (abnormal) and her pulse is 88. Her respiration is 18 and oxygen saturation is 96%.     Chief Complaint: URI    Pt c/o chest congestion, chest tightness, cough productive of purulent sputum, loss of appetite, fever (up to 101.5 at home), body aches, and chills. Pt also reports one episode of emesis this morning due to not having any food in her stomach. Denies abdominal pain, headaches, sinus pain/pressure, neck pain/stiffness, CP, SOB, wheezing. Denies history of asthma. States she is a smoker. Reports taking tylenol for the fever with good relief of fever.     URI    This is a new problem. The current episode started in the past 7 days (Monday ). The problem has been unchanged. The maximum temperature recorded prior to her arrival was 103 - 104 F. The fever has been present for 1 to 2 days. Associated symptoms include coughing and vomiting. Pertinent negatives include no abdominal pain, chest pain, congestion, diarrhea, ear pain, headaches, nausea, rash, sinus pain, sore throat or wheezing. She has tried acetaminophen for the symptoms. The treatment provided significant relief.       Constitution: Positive for chills and fever. Negative for sweating and fatigue.   HENT: Negative for ear pain, congestion, postnasal drip, sinus pain, sinus pressure, sore throat and voice change.    Neck: Negative for painful lymph nodes.   Cardiovascular: Negative for chest trauma, chest pain, leg swelling and sob on exertion.   Eyes: Negative for eye redness.   Respiratory: Positive for chest tightness, cough and sputum production. Negative for bloody sputum, COPD, shortness of breath, stridor, wheezing and asthma.         Chest congestion    Gastrointestinal: Positive for vomiting. Negative for abdominal pain, nausea,  constipation and diarrhea.   Musculoskeletal: Positive for muscle ache.   Skin: Negative for rash.   Allergic/Immunologic: Negative for seasonal allergies and asthma.   Neurological: Negative for dizziness, light-headedness and headaches.   Hematologic/Lymphatic: Negative for swollen lymph nodes.       Objective:      Physical Exam   Constitutional: She is oriented to person, place, and time. She appears well-developed and well-nourished. She is cooperative.  Non-toxic appearance. She does not have a sickly appearance. She does not appear ill. No distress.   Patient is sitting pleasantly on exam table in no acute distress. Nontoxic appearing.    HENT:   Head: Normocephalic and atraumatic.   Right Ear: Hearing, tympanic membrane, external ear and ear canal normal.   Left Ear: Hearing, tympanic membrane, external ear and ear canal normal.   Nose: Nose normal. No mucosal edema, rhinorrhea or nasal deformity. No epistaxis. Right sinus exhibits no maxillary sinus tenderness and no frontal sinus tenderness. Left sinus exhibits no maxillary sinus tenderness and no frontal sinus tenderness.   Mouth/Throat: Uvula is midline and mucous membranes are normal. No trismus in the jaw. Normal dentition. No uvula swelling. Posterior oropharyngeal erythema and cobblestoning present. No oropharyngeal exudate or posterior oropharyngeal edema. No tonsillar exudate.   Eyes: Pupils are equal, round, and reactive to light. Conjunctivae and lids are normal. No scleral icterus.   Neck: Trachea normal, full passive range of motion without pain and phonation normal. Neck supple. No neck rigidity. No edema and no erythema present.   Cardiovascular: Normal rate, regular rhythm, normal heart sounds, intact distal pulses and normal pulses.   Pulmonary/Chest: Effort normal. No respiratory distress. She has no decreased breath sounds. She has wheezes. She has no rhonchi.   On initial assessment, diffuse expiratory wheezing noted to all lung fields.  O2 sat 96% on RA. Duo neb given in clinic. On reassessment, wheezing significantly improved, O2 sat improved to 98%, and patient reports improvement in ease of breathing after treatment.    Abdominal: Normal appearance.   Musculoskeletal: Normal range of motion. She exhibits no edema or deformity.   Lymphadenopathy:     She has no cervical adenopathy.   Neurological: She is alert and oriented to person, place, and time. She exhibits normal muscle tone. Coordination normal.   Skin: Skin is warm, dry, intact, not diaphoretic and not pale.   Psychiatric: She has a normal mood and affect. Her speech is normal and behavior is normal. Judgment and thought content normal. Cognition and memory are normal.   Nursing note and vitals reviewed.    X-ray Chest Pa And Lateral  Result Date: 1/29/2020  EXAMINATION: XR CHEST PA AND LATERAL CLINICAL HISTORY: COUGH; Fever, unspecified TECHNIQUE: PA and lateral views of the chest were performed. COMPARISON: None FINDINGS: Heart size normal.  The lungs are clear.  No pleural effusion   See above Electronically signed by: Daron Barajas MD Date:    01/29/2020 Time:    10:28        Results for orders placed or performed in visit on 01/29/20   POCT Influenza A/B   Result Value Ref Range    Rapid Influenza A Ag Negative Negative    Rapid Influenza B Ag Negative Negative     Acceptable Yes      Advised on return/follow-up precautions. Advised on ER precautions. Answered all patient questions. Patient verbalized understanding and voiced agreement with current treatment plan.    Assessment:       1. Fever, unspecified fever cause    2. Acute purulent bronchitis    3. Wheezing        Plan:         Fever, unspecified fever cause  -     POCT Influenza A/B  -     X-Ray Chest PA And Lateral; Future; Expected date: 01/29/2020    Acute purulent bronchitis  -     albuterol (PROVENTIL/VENTOLIN HFA) 90 mcg/actuation inhaler; Inhale 2 puffs into the lungs every 4 (four) hours as needed for  Wheezing or Shortness of Breath. Rescue  Dispense: 6.7 g; Refill: 0  -     doxycycline (VIBRAMYCIN) 100 MG Cap; Take 1 capsule (100 mg total) by mouth 2 (two) times daily. for 10 days  Dispense: 20 capsule; Refill: 0  -     methylPREDNISolone (MEDROL DOSEPACK) 4 mg tablet; use as directed on package until gone  Dispense: 1 Package; Refill: 0  -     promethazine-dextromethorphan (PROMETHAZINE-DM) 6.25-15 mg/5 mL Syrp; Take 5 mLs by mouth 3 (three) times daily as needed (cough).  Dispense: 180 mL; Refill: 0    Wheezing  -     X-Ray Chest PA And Lateral; Future; Expected date: 01/29/2020  -     albuterol nebulizer solution 2.5 mg  -     ipratropium 0.02 % nebulizer solution 0.5 mg      Patient Instructions       If your condition worsens or fails to improve we recommend that you receive another evaluation at the ER immediately or contact your PCP to discuss your concerns or return here. You must understand that you've received an urgent care treatment only and that you may be released before all your medical problems are known or treated. You the patient will arrange for followup care as instructed.    Rest and fluids are important  Can use honey with nik to soothe your throat    Take full course of antibiotics as directed.    You received a steroid prescription today -  this can elevate your blood pressure, elevate your blood sugar, water weight gain, nervous energy, redness to the face.    Take inhaler as prescribed and needed for wheezing  Take prescription cough medication as prescribed as needed for cough  Take mucinex as directed to help with chest congestion    -  Flonase (fluticasone) is a nasal spray which is available over the counter and may help with symptoms of nasal congestion.     -  Tylenol or ibuprofen can also be used as directed for pain unless you have an allergy to them or medical condition such as stomach ulcers, kidney or liver disease or blood thinners etc for which you should not be taking  these type of medications.     Please follow up with your primary care doctor or specialist in the next 48-72hrs as needed and if no improvement.    If you  smoke, please stop smoking.        What Is Acute Bronchitis?  Acute bronchitis is when the airways in your lungs (bronchial tubes) become red and swollen (inflamed). It is usually caused by a viral infection. But it can also occur because of a bacteria or allergen. Symptoms include a cough that produces yellow or greenish mucus and can last for days or sometimes weeks.  Inside healthy lungs    Air travels in and out of the lungs through the airways. The linings of these airways produce sticky mucus. This mucus traps particles that enter the lungs. Tiny structures called cilia then sweep the particles out of the airways.     Healthy airway: Airways are normally open. Air moves in and out easily.      Healthy cilia: Tiny, hairlike cilia sweep mucus and particles up and out of the airways.   Lungs with bronchitis  Bronchitis often occurs with a cold or the flu virus. The airways become inflamed (red and swollen). There is a deep hacking cough from the extra mucus. Other symptoms may include:  · Wheezing  · Chest discomfort  · Shortness of breath  · Mild fever  A second infection, this time due to bacteria, may then occur. And airways irritated by allergens or smoke are more likely to get infected.        Inflamed airway: Inflammation and extra mucus narrow the airway, causing shortness of breath.      Impaired cilia: Extra mucus impairs cilia, causing congestion and wheezing. Smoking makes the problem worse.   Making a diagnosis  A physical exam, health history, and certain tests help your healthcare provider make the diagnosis.  Health history  Your healthcare provider will ask you about your symptoms.  The exam  Your provider listens to your chest for signs of congestion. He or she may also check your ears, nose, and throat.  Possible tests  · A sputum test for  bacteria. This requires a sample of mucus from your lungs.  · A nasal or throat swab. This tests to see if you have a bacterial infection.  · A chest X-ray. This is done if your healthcare provider thinks you have pneumonia.  · Tests to check for an underlying condition. Other tests may be done to check for things such as allergies, asthma, or COPD (chronic obstructive pulmonary disease). You may need to see a specialist for more lung function testing.  Treating a cough  The main treatment for bronchitis is easing symptoms. Avoiding smoke, allergens, and other things that trigger coughing can often help. If the infection is bacterial, you may be given antibiotics. During the illness, it's important to get plenty of sleep. To ease symptoms:  · Dont smoke. Also avoid secondhand smoke.  · Use a humidifier. Or try breathing in steam from a hot shower. This may help loosen mucus.  · Drink a lot of water and juice. They can soothe the throat and may help thin mucus.  · Sit up or use extra pillows when in bed. This helps to lessen coughing and congestion.  · Ask your provider about using medicine. Ask about using cough medicine, pain and fever medicine, or a decongestant.  Antibiotics  Most cases of bronchitis are caused by cold or flu viruses. They dont need antibiotics to treat them, even if your mucus is thick and green or yellow. Antibiotics dont treat viral illness and antibiotics have not been shown to have any benefit in cases of acute bronchitis. Taking antibiotics when they are not needed increases your risk of getting an infection later that is antibiotic-resistant. Antibiotics can also cause severe cases of diarrhea that require other antibiotics to treat.  It is important that you accept your healthcare provider's opinion to not use antibiotics. Your provider will prescribe antibiotics if the infection is caused by bacteria. If they are prescribed:  · Take all of the medicine. Take the medicine until it is  used up, even if symptoms have improved. If you dont, the bronchitis may come back.  · Take the medicines as directed. For instance, some medicines should be taken with food.  · Ask about side effects. Ask your provider or pharmacist what side effects are common, and what to do about them.  Follow-up care  You should see your provider again in 2 to 3 weeks. By this time, symptoms should have improved. An infection that lasts longer may mean you have a more serious problem.  Prevention  · Avoid tobacco smoke. If you smoke, quit. Stay away from smoky places. Ask friends and family not to smoke around you, or in your home or car.  · Get checked for allergies.  · Ask your provider about getting a yearly flu shot. Also ask about pneumococcal or pneumonia shots.  · Wash your hands often. This helps reduce the chance of picking up viruses that cause colds and flu.  Call your healthcare provider if:  · Symptoms worsen, or you have new symptoms  · Breathing problems worsen or  become severe  · Symptoms dont get better within a week, or within 3 days of taking antibiotics   Date Last Reviewed: 2/1/2017  © 6274-3709 [email or web address]. [address] [place] [address], [place] [address]. All rights reserved. This information is not intended as a substitute for professional medical care. Always follow your healthcare professional's instructions.

## 2020-06-04 ENCOUNTER — OFFICE VISIT (OUTPATIENT)
Dept: URGENT CARE | Facility: CLINIC | Age: 40
End: 2020-06-04
Payer: COMMERCIAL

## 2020-06-04 VITALS
OXYGEN SATURATION: 99 % | RESPIRATION RATE: 16 BRPM | HEART RATE: 77 BPM | BODY MASS INDEX: 23.56 KG/M2 | SYSTOLIC BLOOD PRESSURE: 146 MMHG | DIASTOLIC BLOOD PRESSURE: 89 MMHG | HEIGHT: 60 IN | WEIGHT: 120 LBS | TEMPERATURE: 99 F

## 2020-06-04 DIAGNOSIS — S63.501A RIGHT WRIST SPRAIN, INITIAL ENCOUNTER: Primary | ICD-10-CM

## 2020-06-04 DIAGNOSIS — M25.531 ACUTE PAIN OF RIGHT WRIST: ICD-10-CM

## 2020-06-04 PROCEDURE — 99214 OFFICE O/P EST MOD 30 MIN: CPT | Mod: 25,S$GLB,, | Performed by: PHYSICIAN ASSISTANT

## 2020-06-04 PROCEDURE — 73130 X-RAY EXAM OF HAND: CPT | Mod: FY,RT,S$GLB, | Performed by: RADIOLOGY

## 2020-06-04 PROCEDURE — 73130 XR HAND COMPLETE 3 VIEW RIGHT: ICD-10-PCS | Mod: FY,RT,S$GLB, | Performed by: RADIOLOGY

## 2020-06-04 PROCEDURE — 99214 PR OFFICE/OUTPT VISIT, EST, LEVL IV, 30-39 MIN: ICD-10-PCS | Mod: 25,S$GLB,, | Performed by: PHYSICIAN ASSISTANT

## 2020-06-04 PROCEDURE — 96372 THER/PROPH/DIAG INJ SC/IM: CPT | Mod: S$GLB,,, | Performed by: PHYSICIAN ASSISTANT

## 2020-06-04 PROCEDURE — 73110 X-RAY EXAM OF WRIST: CPT | Mod: FY,RT,S$GLB, | Performed by: RADIOLOGY

## 2020-06-04 PROCEDURE — 73110 XR WRIST COMPLETE 3 VIEWS RIGHT: ICD-10-PCS | Mod: FY,RT,S$GLB, | Performed by: RADIOLOGY

## 2020-06-04 PROCEDURE — 96372 PR INJECTION,THERAP/PROPH/DIAG2ST, IM OR SUBCUT: ICD-10-PCS | Mod: S$GLB,,, | Performed by: PHYSICIAN ASSISTANT

## 2020-06-04 RX ORDER — KETOROLAC TROMETHAMINE 30 MG/ML
60 INJECTION, SOLUTION INTRAMUSCULAR; INTRAVENOUS
Status: COMPLETED | OUTPATIENT
Start: 2020-06-04 | End: 2020-06-04

## 2020-06-04 RX ORDER — DICLOFENAC SODIUM 50 MG/1
50 TABLET, DELAYED RELEASE ORAL 2 TIMES DAILY PRN
Qty: 14 TABLET | Refills: 0 | Status: SHIPPED | OUTPATIENT
Start: 2020-06-04 | End: 2020-06-11

## 2020-06-04 RX ADMIN — KETOROLAC TROMETHAMINE 60 MG: 30 INJECTION, SOLUTION INTRAMUSCULAR; INTRAVENOUS at 04:06

## 2020-06-04 NOTE — PATIENT INSTRUCTIONS
PLEASE READ YOUR DISCHARGE INSTRUCTIONS ENTIRELY AS IT CONTAINS IMPORTANT INFORMATION.  You received an injection of a powerful NSAID today (Toradol).  Its effects will last up to 24 hours.  Please do not take another NSAID (ie aspirin, ibuprofen, Aleve, Advil or Motrin) until this time tomorrow.  If you continue to have pain, you may take Tylenol (acetaminophen) if you are not allergic to this medication.  You may start the voltaren tomorrow afternoon.  - Rest.    - Drink plenty of fluids.    - Tylenol or Ibuprofen as directed as needed for fever/pain.    - If you were prescribed antibiotics, please take them to completion.  - If you are female and on birth control pills - please use additional methods of contraception to prevent pregnancy while on antibiotics and for one cycle after.   - If you were prescribed a narcotic medication or muscle relaxer, do not drive or operate heavy equipment or machinery while taking these medications, as they can cause drowsiness.   - If you smoke, please stop smoking.  -You must understand that you've received an Urgent Care treatment only and that you may be released before all your medical problems are known or treated. You, the patient, will    arrange for follow up care as instructed. Please arrange follow up with your primary medical clinic as soon as possible.   - Follow up with your PCP or specialty clinic as directed in the next 1-2 weeks if not improved or as needed.  You can call [phone] to schedule an appointment with the appropriate provider.    - Please return to Urgent Care or to the Emergency Department if your symptoms worsen.    Patient aware and verbalized understanding.    Wrist Sprain  A sprain is an injury to the ligaments or capsule that holds a joint together. There are no broken bones. Most sprains take about 3 to 6 weeks to heal. If it a severe sprain where the ligament is completely torn, it can take months to recover.     Most wrist sprains are  treated with a splint, wrist brace, or elastic wrap for support. Severe sprains may require surgery.  Home care  · Keep your arm elevated to reduce pain and swelling. This is very important during the first 48 hours.  · Apply an ice pack over the injured area for 15 to 20 minutes every 3 to 6 hours. You should do this for the first 24 to 48 hours. You can make an ice pack by filling a plastic bag that seals at the top with ice cubes and then wrapping it with a thin towel. Continue to use ice packs for relief of pain and swelling as needed. As the ice melts, be careful to avoid getting your wrap, splint, or cast wet. After 48 hours, apply heat (warm shower or warm bath) for 15 to 20 minutes several times a day, or alternate ice and heat.   · You may use over-the-counter pain medicine to control pain, unless another pain medicine was prescribed. If you have chronic liver or kidney disease or ever had a stomach ulcer or GI bleeding, talk with your doctor before using these medicines.  · If you were given a splint or brace, wear it for the time advised by your doctor.  Follow-up care  Follow up with your healthcare provider as advised. Any X-rays you had today dont show any broken bones, breaks, or fractures. Sometimes fractures dont show up on the first X-ray. Bruises and sprains can sometimes hurt as much as a fracture. These injuries can take time to heal completely. If your symptoms dont improve or they get worse, talk with your doctor. You may need a repeat X-ray. If X-rays were taken, you will be told of any new findings that may affect your care.  When to seek medical advice  Call your healthcare provider right away if any of these occur:  · Pain or swelling increases  · Fingers or hand becomes cold, blue, numb, or tingly  Date Last Reviewed: 11/20/2015  © 8101-0527 [employer]. [address] [place] [address], [place] [address]. All rights reserved. This information is not intended as a substitute for  professional medical care. Always follow your healthcare professional's instructions.        Understanding a Wrist Sprain    A ligament is a strong band of tissue that connects bone to bone. The wrist has many ligaments. If any of these get stretched or torn, this is called a wrist sprain. A wrist sprain can be painful. It can also limit movement and use of the wrist and hand. Depending on the severity of the sprain, it may take a few weeks or longer for the wrist to heal.  Causes of a wrist sprain  Wrist sprains are most often caused by falling and landing on an outstretched hand. Anyone can get a wrist sprain. But the injury may be more likely in people who are very active or play sports.  Symptoms of a wrist sprain  At the time of injury, you may feel a popping or tearing in the wrist. You may have pain, redness, and swelling. You may also have some bruising. In some cases, symptoms may spread from the wrist to the hand. Until the wrist has healed, it may be hard to move or use the wrist and hand for normal tasks and activities, especially gripping and lifting.  Treating a wrist sprain  Treatment for wrist sprains may include any of the following:   · Prescription or over-the-counter medicines. These help reduce pain and swelling.  · A splint, brace, or cast. This is worn to support the wrist and keep the wrist from moving. You may need it for several weeks or longer, until the wrist heals.  · Physical therapy and exercises. These help improve strength, flexibility, and range of motion in the wrist and hand.  · Surgery. You may need surgery if the sprain is severe. For example, if the ligament is torn or if nearby tissues and bone are also injured. After surgery, you will often need to wear a splint or cast for a month or longer, until the wrist has healed.  Self-care measures  You can do several things to help relieve pain and swelling. These may include:  · Limiting how much you move and use your wrist and  hand  · Applying an ice pack to the injured area  · Keeping your wrist and hand raised (elevated) above heart level  · Wrapping your wrist in an elastic bandage  Possible complications  If the injury doesnt heal properly, it has a higher chance of happening again. The injury can also become long-term (chronic). This can cause ongoing pain, weakness, or instability of the wrist. Over time, arthritis may develop in the wrist. This can worsen pain and cause stiffness and limited movement of the wrist and hand.        When to call your healthcare provider  Call your healthcare provider right away if you have any of these:  · Fever of 100.4°F (38°C) or higher, or as directed  · Symptoms that dont get better with treatment, or get worse  · Hand or fingers that feel numb or very cold, turn blue or gray, or swell  · Symptoms such as redness, warmth, swelling, bleeding, or drainage that occur at the incision sites. This only applies if you had surgery.  · New symptoms   Date Last Reviewed: 3/10/2016  © 3853-5085 [email or web address] [address] [place] [address], [place] [address]. All rights reserved. This information is not intended as a substitute for professional medical care. Always follow your healthcare professional's instructions.

## 2020-06-04 NOTE — PROGRESS NOTES
Subjective:       Patient ID: Marilu Frausto is a 39 y.o. female.    Vitals:  height is 5' (1.524 m) and weight is 54.4 kg (120 lb). Her oral temperature is 98.5 °F (36.9 °C). Her blood pressure is 146/89 (abnormal) and her pulse is 77. Her respiration is 16 and oxygen saturation is 99%.     Chief Complaint: Hand Pain (RIGHT )    Ms. Frausto presents for evaluation of right wrist & hand pain.  This occurred this morning.  She had a mechanical fall & reached out with her right arm to catch herself.  She denies any LOC or head trauma.  She is now having pain in her right wrist at the distal radius area.  No tingling.  It is mostly painful when she picks up items.  She has tried ice & NSAIDs for her symptoms with some relief.     Hand Pain    The incident occurred 12 to 24 hours ago. The incident occurred at home. The injury mechanism was a fall. The pain is present in the right hand. The quality of the pain is described as aching, cramping and stabbing. The pain does not radiate. The pain is at a severity of 8/10. The pain is moderate. The pain has been constant since the incident. Associated symptoms include tingling. Pertinent negatives include no chest pain or numbness. Nothing aggravates the symptoms. She has tried immobilization and ice for the symptoms. The treatment provided no relief.       Constitution: Negative for chills, sweating, fatigue and fever.   HENT: Negative for congestion and sore throat.    Neck: Negative for painful lymph nodes.   Cardiovascular: Negative for chest pain and leg swelling.   Eyes: Negative for double vision and blurred vision.   Respiratory: Negative for cough and shortness of breath.    Gastrointestinal: Negative for nausea, vomiting and diarrhea.   Genitourinary: Negative for dysuria, frequency, urgency and history of kidney stones.   Musculoskeletal: Positive for pain, trauma, joint pain and joint swelling. Negative for muscle cramps and muscle ache.   Skin: Negative for  color change, pale, rash and bruising.   Allergic/Immunologic: Negative for seasonal allergies.   Neurological: Negative for dizziness, history of vertigo, light-headedness, passing out, headaches and numbness.   Hematologic/Lymphatic: Negative for swollen lymph nodes.   Psychiatric/Behavioral: Negative for nervous/anxious, sleep disturbance and depression. The patient is not nervous/anxious.        Objective:      Physical Exam   Constitutional: She is oriented to person, place, and time. She appears well-developed and well-nourished. She is cooperative.  Non-toxic appearance. She does not appear ill. No distress.   HENT:   Head: Normocephalic and atraumatic.   Right Ear: Hearing, tympanic membrane, external ear and ear canal normal.   Left Ear: Hearing, tympanic membrane, external ear and ear canal normal.   Nose: Nose normal. No mucosal edema, rhinorrhea or nasal deformity. No epistaxis. Right sinus exhibits no maxillary sinus tenderness and no frontal sinus tenderness. Left sinus exhibits no maxillary sinus tenderness and no frontal sinus tenderness.   Mouth/Throat: Uvula is midline, oropharynx is clear and moist and mucous membranes are normal. No trismus in the jaw. Normal dentition. No uvula swelling. No posterior oropharyngeal erythema.   Eyes: Conjunctivae and lids are normal. Right eye exhibits no discharge. Left eye exhibits no discharge. No scleral icterus.   Neck: Trachea normal, normal range of motion, full passive range of motion without pain and phonation normal. Neck supple.   Cardiovascular: Normal rate, regular rhythm, normal heart sounds, intact distal pulses and normal pulses.   Pulmonary/Chest: Effort normal and breath sounds normal. No respiratory distress.   Abdominal: Soft. Normal appearance and bowel sounds are normal. She exhibits no distension, no pulsatile midline mass and no mass. There is no tenderness.   Musculoskeletal: Normal range of motion. She exhibits no edema or deformity.         Right wrist: She exhibits tenderness and swelling. She exhibits normal range of motion, no bony tenderness, no effusion, no crepitus, no deformity and no laceration.        Arms:  Right wrist with radial TTP and mild edema.  Ecchymosis also seen.  Full ROM wrist & fingers Radial pulse 2+, cap refill WNL, sensation intact.    Neurological: She is alert and oriented to person, place, and time. She exhibits normal muscle tone. Coordination normal.   Skin: Skin is warm, dry, intact, not diaphoretic and not pale.   Psychiatric: She has a normal mood and affect. Her speech is normal and behavior is normal. Judgment and thought content normal. Cognition and memory are normal.   Nursing note and vitals reviewed.      XR R hand & wrist - No acute radiographic abnormality of the right hand or wrist.    Assessment:       1. Right wrist sprain, initial encounter    2. Acute pain of right wrist        Plan:         Right wrist sprain, initial encounter  -     WRIST BRACE FOR HOME USE    Acute pain of right wrist  -     XR WRIST COMPLETE 3 VIEWS RIGHT; Future; Expected date: 06/04/2020  -     XR HAND COMPLETE 3 VIEW RIGHT; Future; Expected date: 06/04/2020    Other orders  -     ketorolac injection 60 mg  -     diclofenac (VOLTAREN) 50 MG EC tablet; Take 1 tablet (50 mg total) by mouth 2 (two) times daily as needed.  Dispense: 14 tablet; Refill: 0    R wrist brace placed.   RICE therapy.     Patient Instructions     PLEASE READ YOUR DISCHARGE INSTRUCTIONS ENTIRELY AS IT CONTAINS IMPORTANT INFORMATION.  You received an injection of a powerful NSAID today (Toradol).  Its effects will last up to 24 hours.  Please do not take another NSAID (ie aspirin, ibuprofen, Aleve, Advil or Motrin) until this time tomorrow.  If you continue to have pain, you may take Tylenol (acetaminophen) if you are not allergic to this medication.  You may start the voltaren tomorrow afternoon.  - Rest.    - Drink plenty of fluids.    - Tylenol or Ibuprofen  as directed as needed for fever/pain.    - If you were prescribed antibiotics, please take them to completion.  - If you are female and on birth control pills - please use additional methods of contraception to prevent pregnancy while on antibiotics and for one cycle after.   - If you were prescribed a narcotic medication or muscle relaxer, do not drive or operate heavy equipment or machinery while taking these medications, as they can cause drowsiness.   - If you smoke, please stop smoking.  -You must understand that you've received an Urgent Care treatment only and that you may be released before all your medical problems are known or treated. You, the patient, will    arrange for follow up care as instructed. Please arrange follow up with your primary medical clinic as soon as possible.   - Follow up with your PCP or specialty clinic as directed in the next 1-2 weeks if not improved or as needed.  You can call [phone] to schedule an appointment with the appropriate provider.    - Please return to Urgent Care or to the Emergency Department if your symptoms worsen.    Patient aware and verbalized understanding.    Wrist Sprain  A sprain is an injury to the ligaments or capsule that holds a joint together. There are no broken bones. Most sprains take about 3 to 6 weeks to heal. If it a severe sprain where the ligament is completely torn, it can take months to recover.     Most wrist sprains are treated with a splint, wrist brace, or elastic wrap for support. Severe sprains may require surgery.  Home care  · Keep your arm elevated to reduce pain and swelling. This is very important during the first 48 hours.  · Apply an ice pack over the injured area for 15 to 20 minutes every 3 to 6 hours. You should do this for the first 24 to 48 hours. You can make an ice pack by filling a plastic bag that seals at the top with ice cubes and then wrapping it with a thin towel. Continue to use ice packs for relief of pain  and swelling as needed. As the ice melts, be careful to avoid getting your wrap, splint, or cast wet. After 48 hours, apply heat (warm shower or warm bath) for 15 to 20 minutes several times a day, or alternate ice and heat.   · You may use over-the-counter pain medicine to control pain, unless another pain medicine was prescribed. If you have chronic liver or kidney disease or ever had a stomach ulcer or GI bleeding, talk with your doctor before using these medicines.  · If you were given a splint or brace, wear it for the time advised by your doctor.  Follow-up care  Follow up with your healthcare provider as advised. Any X-rays you had today dont show any broken bones, breaks, or fractures. Sometimes fractures dont show up on the first X-ray. Bruises and sprains can sometimes hurt as much as a fracture. These injuries can take time to heal completely. If your symptoms dont improve or they get worse, talk with your doctor. You may need a repeat X-ray. If X-rays were taken, you will be told of any new findings that may affect your care.  When to seek medical advice  Call your healthcare provider right away if any of these occur:  · Pain or swelling increases  · Fingers or hand becomes cold, blue, numb, or tingly  Date Last Reviewed: 11/20/2015  © 0310-2941 [employer]. [address] [place] [address]. All rights reserved. This information is not intended as a substitute for professional medical care. Always follow your healthcare professional's instructions.        Understanding a Wrist Sprain    A ligament is a strong band of tissue that connects bone to bone. The wrist has many ligaments. If any of these get stretched or torn, this is called a wrist sprain. A wrist sprain can be painful. It can also limit movement and use of the wrist and hand. Depending on the severity of the sprain, it may take a few weeks or longer for the wrist to heal.  Causes of a wrist sprain  Wrist sprains are  most often caused by falling and landing on an outstretched hand. Anyone can get a wrist sprain. But the injury may be more likely in people who are very active or play sports.  Symptoms of a wrist sprain  At the time of injury, you may feel a popping or tearing in the wrist. You may have pain, redness, and swelling. You may also have some bruising. In some cases, symptoms may spread from the wrist to the hand. Until the wrist has healed, it may be hard to move or use the wrist and hand for normal tasks and activities, especially gripping and lifting.  Treating a wrist sprain  Treatment for wrist sprains may include any of the following:   · Prescription or over-the-counter medicines. These help reduce pain and swelling.  · A splint, brace, or cast. This is worn to support the wrist and keep the wrist from moving. You may need it for several weeks or longer, until the wrist heals.  · Physical therapy and exercises. These help improve strength, flexibility, and range of motion in the wrist and hand.  · Surgery. You may need surgery if the sprain is severe. For example, if the ligament is torn or if nearby tissues and bone are also injured. After surgery, you will often need to wear a splint or cast for a month or longer, until the wrist has healed.  Self-care measures  You can do several things to help relieve pain and swelling. These may include:  · Limiting how much you move and use your wrist and hand  · Applying an ice pack to the injured area  · Keeping your wrist and hand raised (elevated) above heart level  · Wrapping your wrist in an elastic bandage  Possible complications  If the injury doesnt heal properly, it has a higher chance of happening again. The injury can also become long-term (chronic). This can cause ongoing pain, weakness, or instability of the wrist. Over time, arthritis may develop in the wrist. This can worsen pain and cause stiffness and limited movement of the wrist and hand.        When  to call your healthcare provider  Call your healthcare provider right away if you have any of these:  · Fever of 100.4°F (38°C) or higher, or as directed  · Symptoms that dont get better with treatment, or get worse  · Hand or fingers that feel numb or very cold, turn blue or gray, or swell  · Symptoms such as redness, warmth, swelling, bleeding, or drainage that occur at the incision sites. This only applies if you had surgery.  · New symptoms   Date Last Reviewed: 3/10/2016  © 5007-8959 [employer]. [address] [place] [id number]. All rights reserved. This information is not intended as a substitute for professional medical care. Always follow your healthcare professional's instructions.

## 2021-04-15 ENCOUNTER — PATIENT MESSAGE (OUTPATIENT)
Dept: RESEARCH | Facility: HOSPITAL | Age: 41
End: 2021-04-15

## 2021-12-16 ENCOUNTER — CLINICAL SUPPORT (OUTPATIENT)
Dept: SMOKING CESSATION | Facility: CLINIC | Age: 41
End: 2021-12-16
Payer: COMMERCIAL

## 2021-12-16 DIAGNOSIS — F17.210 MODERATE CIGARETTE SMOKER (10-19 PER DAY): Primary | ICD-10-CM

## 2021-12-16 PROCEDURE — 99404 PR PREVENT COUNSEL,INDIV,60 MIN: ICD-10-PCS | Mod: S$GLB,,,

## 2021-12-16 PROCEDURE — 99999 PR PBB SHADOW E&M-EST. PATIENT-LVL I: ICD-10-PCS | Mod: PBBFAC,,,

## 2021-12-16 PROCEDURE — 99404 PREV MED CNSL INDIV APPRX 60: CPT | Mod: S$GLB,,,

## 2021-12-16 PROCEDURE — 99999 PR PBB SHADOW E&M-EST. PATIENT-LVL I: CPT | Mod: PBBFAC,,,

## 2021-12-16 RX ORDER — IBUPROFEN 200 MG
1 TABLET ORAL DAILY
Qty: 28 PATCH | Refills: 0 | Status: SHIPPED | OUTPATIENT
Start: 2021-12-16 | End: 2022-01-11

## 2021-12-16 RX ORDER — MICONAZOLE NITRATE 2 %
2 CREAM (GRAM) TOPICAL
Qty: 100 EACH | Refills: 0 | Status: SHIPPED | OUTPATIENT
Start: 2021-12-16 | End: 2022-01-11 | Stop reason: SDUPTHER

## 2021-12-21 ENCOUNTER — OFFICE VISIT (OUTPATIENT)
Dept: FAMILY MEDICINE | Facility: CLINIC | Age: 41
End: 2021-12-21
Payer: COMMERCIAL

## 2021-12-21 VITALS
TEMPERATURE: 98 F | BODY MASS INDEX: 25.97 KG/M2 | HEART RATE: 65 BPM | WEIGHT: 132.25 LBS | DIASTOLIC BLOOD PRESSURE: 74 MMHG | OXYGEN SATURATION: 98 % | SYSTOLIC BLOOD PRESSURE: 102 MMHG | HEIGHT: 60 IN

## 2021-12-21 DIAGNOSIS — G40.909 NONINTRACTABLE EPILEPSY WITHOUT STATUS EPILEPTICUS, UNSPECIFIED EPILEPSY TYPE: ICD-10-CM

## 2021-12-21 DIAGNOSIS — Z76.89 ENCOUNTER TO ESTABLISH CARE: Primary | ICD-10-CM

## 2021-12-21 DIAGNOSIS — G43.009 MIGRAINE WITHOUT AURA AND WITHOUT STATUS MIGRAINOSUS, NOT INTRACTABLE: ICD-10-CM

## 2021-12-21 PROCEDURE — 99204 OFFICE O/P NEW MOD 45 MIN: CPT | Mod: S$GLB,,, | Performed by: FAMILY MEDICINE

## 2021-12-21 PROCEDURE — 99999 PR PBB SHADOW E&M-EST. PATIENT-LVL III: ICD-10-PCS | Mod: PBBFAC,,, | Performed by: FAMILY MEDICINE

## 2021-12-21 PROCEDURE — 99204 PR OFFICE/OUTPT VISIT, NEW, LEVL IV, 45-59 MIN: ICD-10-PCS | Mod: S$GLB,,, | Performed by: FAMILY MEDICINE

## 2021-12-21 PROCEDURE — 99999 PR PBB SHADOW E&M-EST. PATIENT-LVL III: CPT | Mod: PBBFAC,,, | Performed by: FAMILY MEDICINE

## 2021-12-22 ENCOUNTER — LAB VISIT (OUTPATIENT)
Dept: LAB | Facility: HOSPITAL | Age: 41
End: 2021-12-22
Attending: FAMILY MEDICINE
Payer: COMMERCIAL

## 2021-12-22 DIAGNOSIS — Z76.89 ENCOUNTER TO ESTABLISH CARE: ICD-10-CM

## 2021-12-22 PROBLEM — G40.909 EPILEPSY: Status: ACTIVE | Noted: 2021-12-22

## 2021-12-22 PROBLEM — G43.909 MIGRAINE WITHOUT STATUS MIGRAINOSUS, NOT INTRACTABLE: Status: ACTIVE | Noted: 2021-12-22

## 2021-12-22 LAB
ALBUMIN SERPL BCP-MCNC: 3.7 G/DL (ref 3.5–5.2)
ALP SERPL-CCNC: 123 U/L (ref 55–135)
ALT SERPL W/O P-5'-P-CCNC: 22 U/L (ref 10–44)
ANION GAP SERPL CALC-SCNC: 9 MMOL/L (ref 8–16)
AST SERPL-CCNC: 14 U/L (ref 10–40)
BASOPHILS # BLD AUTO: 0.09 K/UL (ref 0–0.2)
BASOPHILS NFR BLD: 1 % (ref 0–1.9)
BILIRUB SERPL-MCNC: 0.3 MG/DL (ref 0.1–1)
BUN SERPL-MCNC: 16 MG/DL (ref 6–20)
CALCIUM SERPL-MCNC: 9.1 MG/DL (ref 8.7–10.5)
CHLORIDE SERPL-SCNC: 109 MMOL/L (ref 95–110)
CHOLEST SERPL-MCNC: 267 MG/DL (ref 120–199)
CHOLEST/HDLC SERPL: 4.5 {RATIO} (ref 2–5)
CO2 SERPL-SCNC: 22 MMOL/L (ref 23–29)
CREAT SERPL-MCNC: 0.8 MG/DL (ref 0.5–1.4)
DIFFERENTIAL METHOD: ABNORMAL
EOSINOPHIL # BLD AUTO: 0.2 K/UL (ref 0–0.5)
EOSINOPHIL NFR BLD: 2.2 % (ref 0–8)
ERYTHROCYTE [DISTWIDTH] IN BLOOD BY AUTOMATED COUNT: 12.5 % (ref 11.5–14.5)
EST. GFR  (AFRICAN AMERICAN): >60 ML/MIN/1.73 M^2
EST. GFR  (NON AFRICAN AMERICAN): >60 ML/MIN/1.73 M^2
GLUCOSE SERPL-MCNC: 100 MG/DL (ref 70–110)
HCT VFR BLD AUTO: 40.2 % (ref 37–48.5)
HDLC SERPL-MCNC: 60 MG/DL (ref 40–75)
HDLC SERPL: 22.5 % (ref 20–50)
HGB BLD-MCNC: 13.1 G/DL (ref 12–16)
IMM GRANULOCYTES # BLD AUTO: 0.02 K/UL (ref 0–0.04)
IMM GRANULOCYTES NFR BLD AUTO: 0.2 % (ref 0–0.5)
LDLC SERPL CALC-MCNC: 185.2 MG/DL (ref 63–159)
LYMPHOCYTES # BLD AUTO: 3.6 K/UL (ref 1–4.8)
LYMPHOCYTES NFR BLD: 40.6 % (ref 18–48)
MCH RBC QN AUTO: 31.2 PG (ref 27–31)
MCHC RBC AUTO-ENTMCNC: 32.6 G/DL (ref 32–36)
MCV RBC AUTO: 96 FL (ref 82–98)
MONOCYTES # BLD AUTO: 0.7 K/UL (ref 0.3–1)
MONOCYTES NFR BLD: 7.4 % (ref 4–15)
NEUTROPHILS # BLD AUTO: 4.3 K/UL (ref 1.8–7.7)
NEUTROPHILS NFR BLD: 48.6 % (ref 38–73)
NONHDLC SERPL-MCNC: 207 MG/DL
NRBC BLD-RTO: 0 /100 WBC
PLATELET # BLD AUTO: 475 K/UL (ref 150–450)
PMV BLD AUTO: 8.2 FL (ref 9.2–12.9)
POTASSIUM SERPL-SCNC: 4.5 MMOL/L (ref 3.5–5.1)
PROT SERPL-MCNC: 6.9 G/DL (ref 6–8.4)
RBC # BLD AUTO: 4.2 M/UL (ref 4–5.4)
SODIUM SERPL-SCNC: 140 MMOL/L (ref 136–145)
TRIGL SERPL-MCNC: 109 MG/DL (ref 30–150)
TSH SERPL DL<=0.005 MIU/L-ACNC: 2.92 UIU/ML (ref 0.4–4)
WBC # BLD AUTO: 8.82 K/UL (ref 3.9–12.7)

## 2021-12-22 PROCEDURE — 86803 HEPATITIS C AB TEST: CPT | Performed by: FAMILY MEDICINE

## 2021-12-22 PROCEDURE — 84443 ASSAY THYROID STIM HORMONE: CPT | Performed by: FAMILY MEDICINE

## 2021-12-22 PROCEDURE — 87389 HIV-1 AG W/HIV-1&-2 AB AG IA: CPT | Performed by: FAMILY MEDICINE

## 2021-12-22 PROCEDURE — 80061 LIPID PANEL: CPT | Performed by: FAMILY MEDICINE

## 2021-12-22 PROCEDURE — 80053 COMPREHEN METABOLIC PANEL: CPT | Performed by: FAMILY MEDICINE

## 2021-12-22 PROCEDURE — 85025 COMPLETE CBC W/AUTO DIFF WBC: CPT | Performed by: FAMILY MEDICINE

## 2021-12-22 PROCEDURE — 36415 COLL VENOUS BLD VENIPUNCTURE: CPT | Performed by: FAMILY MEDICINE

## 2021-12-23 ENCOUNTER — PATIENT MESSAGE (OUTPATIENT)
Dept: FAMILY MEDICINE | Facility: CLINIC | Age: 41
End: 2021-12-23
Payer: COMMERCIAL

## 2021-12-23 LAB
HCV AB SERPL QL IA: NEGATIVE
HIV 1+2 AB+HIV1 P24 AG SERPL QL IA: NEGATIVE

## 2021-12-27 DIAGNOSIS — E78.5 HYPERLIPIDEMIA, UNSPECIFIED HYPERLIPIDEMIA TYPE: Primary | ICD-10-CM

## 2021-12-27 RX ORDER — EZETIMIBE 10 MG/1
10 TABLET ORAL DAILY
Qty: 90 TABLET | Refills: 0 | Status: SHIPPED | OUTPATIENT
Start: 2021-12-27 | End: 2022-03-06

## 2022-01-11 ENCOUNTER — CLINICAL SUPPORT (OUTPATIENT)
Dept: SMOKING CESSATION | Facility: CLINIC | Age: 42
End: 2022-01-11

## 2022-01-11 DIAGNOSIS — F17.210 MODERATE CIGARETTE SMOKER (10-19 PER DAY): ICD-10-CM

## 2022-01-11 DIAGNOSIS — F17.210 CIGARETTE NICOTINE DEPENDENCE, UNCOMPLICATED: Primary | ICD-10-CM

## 2022-01-11 PROCEDURE — 99402 PREV MED CNSL INDIV APPRX 30: CPT | Mod: S$GLB,,,

## 2022-01-11 PROCEDURE — 99999 PR PBB SHADOW E&M-EST. PATIENT-LVL II: ICD-10-PCS | Mod: PBBFAC,,,

## 2022-01-11 PROCEDURE — 99402 PR PREVENT COUNSEL,INDIV,30 MIN: ICD-10-PCS | Mod: S$GLB,,,

## 2022-01-11 PROCEDURE — 99999 PR PBB SHADOW E&M-EST. PATIENT-LVL II: CPT | Mod: PBBFAC,,,

## 2022-01-11 RX ORDER — IBUPROFEN 200 MG
1 TABLET ORAL DAILY
Qty: 28 PATCH | Refills: 0 | Status: SHIPPED | OUTPATIENT
Start: 2022-01-11 | End: 2022-03-06

## 2022-01-11 RX ORDER — MICONAZOLE NITRATE 2 %
2 CREAM (GRAM) TOPICAL
Qty: 100 EACH | Refills: 0 | Status: SHIPPED | OUTPATIENT
Start: 2022-01-11 | End: 2022-02-09 | Stop reason: SDUPTHER

## 2022-01-11 NOTE — PROGRESS NOTES
Quit Date: 12/13/2021    Clinical Status of Patient: Outpatient    Length of Service: 30 minutes    Continuing Medication: Patches, Gum    Other Medications:None    Target Symptoms: Withdrawal and medication side effects. The following were rated moderate (3) to severe (4) on TCRS:    Moderate (3): None    Severe (4): None    Comments:Spoke with patient at length by telephone for a smoking cessation follow up. Patient still smoke free. Patient states she is doing well no desire to smoke. The patient requested to wean her medication regimen from 21 mg  to 14 mg patches. The patient denies any abnormal behavioral or mental changes at this time.The patient will continue with  therapy sessions and medication monitoring by CTTS. Prescribed medication management will be by physician.Completion of TCRS (Tobacco Cessation Rating Scale) reviewed strategies, habitual behavior, high risks situations, understanding urges and cravings, stress and relaxation with open discussion and additional interventions, Introduced lapses, relapses, understanding them and analyzing the situation of a lapse, conflict issues that may be linked to a lapse.     Diagnosis: F17.210    Next Visit: 2 weeks

## 2022-01-11 NOTE — Clinical Note
Just a note to advise how the patient is progressing in the tobacco cessation program. Spoke with patient at length by telephone for a smoking cessation follow up. Patient still smoke free. Patient states she is doing well no desire to smoke. The patient requested to wean her medication regimen from 21 mg  to 14 mg patches. The patient denies any abnormal behavioral or mental changes at this time.The patient will continue with  therapy sessions and medication monitoring by CTTS. Prescribed medication management will be by physician.Completion of TCRS (Tobacco Cessation Rating Scale) reviewed strategies, habitual behavior, high risks situations, understanding urges and cravings, stress and relaxation with open discussion and additional interventions, Introduced lapses, relapses, understanding them and analyzing the situation of a lapse, conflict issues that may be linked to a lapse.

## 2022-01-26 ENCOUNTER — CLINICAL SUPPORT (OUTPATIENT)
Dept: SMOKING CESSATION | Facility: CLINIC | Age: 42
End: 2022-01-26
Payer: COMMERCIAL

## 2022-01-26 DIAGNOSIS — F17.210 CIGARETTE NICOTINE DEPENDENCE, UNCOMPLICATED: Primary | ICD-10-CM

## 2022-01-26 PROCEDURE — 99999 PR PBB SHADOW E&M-EST. PATIENT-LVL III: ICD-10-PCS | Mod: PBBFAC,,,

## 2022-01-26 PROCEDURE — 99999 PR PBB SHADOW E&M-EST. PATIENT-LVL III: CPT | Mod: PBBFAC,,,

## 2022-01-26 PROCEDURE — 99402 PR PREVENT COUNSEL,INDIV,30 MIN: ICD-10-PCS | Mod: ,,,

## 2022-01-26 PROCEDURE — 99402 PREV MED CNSL INDIV APPRX 30: CPT | Mod: ,,,

## 2022-01-26 NOTE — PROGRESS NOTES
Quit Date: 12/13/2021        Clinical Status of Patient: Outpatient        Length of Service: 30 minutes        Continuing Medication:  Yes Patches, gum    Other Medications: none      Target Symptoms: Withdrawal and medication side effects. The following were rated moderate (3) to severe (4) on TCRS:  Moderate (3): None  Severe (4): None      Comments:Spoke with patient at length by telephone for a smoking cessation follow up. Pt still  smoke free as of 12/13/2021.Pt states she is doing fine no problems. The patient remains on the prescribed tobacco cessation medication regimen of 14 mg  Patches and 2 mg gum without any negative side effects at this time. The patient will continue with  therapy sessions and medication monitoring by CTTS. Prescribed medication management will be by physician. completion of TCRS (Tobacco Cessation Rating Scale) reviewed strategies, habitual behavior, high risks situations, understanding urges and cravings, stress and relaxation with open discussion and additional interventions, Introduced lapses, relapses, understanding them and analyzing the situation of a lapse, conflict issues that may be linked to a lapse.     Diagnosis: F17.210    Next Visit :2 weeks

## 2022-01-26 NOTE — Clinical Note
:Spoke with patient at length by telephone for a smoking cessation follow up. Pt still  smoke free as of 12/13/2021.Pt states she is doing fine no problems. The patient remains on the prescribed tobacco cessation medication regimen of 14 mg  Patches and 2 mg gum without any negative side effects at this time. The patient will continue with  therapy sessions and medication monitoring by CTTS. Prescribed medication management will be by physician. completion of TCRS (Tobacco Cessation Rating Scale) reviewed strategies, habitual behavior, high risks situations, understanding urges and cravings, stress and relaxation with open discussion and additional interventions, Introduced lapses, relapses, understanding them and analyzing the situation of a lapse, conflict issues that may be linked to a lapse.

## 2022-02-09 ENCOUNTER — CLINICAL SUPPORT (OUTPATIENT)
Dept: SMOKING CESSATION | Facility: CLINIC | Age: 42
End: 2022-02-09

## 2022-02-09 DIAGNOSIS — F17.210 MODERATE CIGARETTE SMOKER (10-19 PER DAY): ICD-10-CM

## 2022-02-09 DIAGNOSIS — F17.210 CIGARETTE NICOTINE DEPENDENCE, UNCOMPLICATED: Primary | ICD-10-CM

## 2022-02-09 PROCEDURE — 99999 PR PBB SHADOW E&M-EST. PATIENT-LVL III: ICD-10-PCS | Mod: PBBFAC,,,

## 2022-02-09 PROCEDURE — 99999 PR PBB SHADOW E&M-EST. PATIENT-LVL III: CPT | Mod: PBBFAC,,,

## 2022-02-09 PROCEDURE — 99402 PREV MED CNSL INDIV APPRX 30: CPT | Mod: S$GLB,,,

## 2022-02-09 PROCEDURE — 99402 PR PREVENT COUNSEL,INDIV,30 MIN: ICD-10-PCS | Mod: S$GLB,,,

## 2022-02-09 RX ORDER — NICOTINE 7MG/24HR
1 PATCH, TRANSDERMAL 24 HOURS TRANSDERMAL DAILY
Qty: 28 PATCH | Refills: 0 | Status: SHIPPED | OUTPATIENT
Start: 2022-02-09 | End: 2022-06-17

## 2022-02-09 RX ORDER — MICONAZOLE NITRATE 2 %
2 CREAM (GRAM) TOPICAL
Qty: 100 EACH | Refills: 0 | Status: SHIPPED | OUTPATIENT
Start: 2022-02-09 | End: 2022-02-23 | Stop reason: SDUPTHER

## 2022-02-09 NOTE — PROGRESS NOTES
Individual Follow-Up Form    2/9/2022    Quit Date: 12/7/2021    Clinical Status of Patient: Outpatient    Length of Service: 30 minutes    Continuing Medication: yes  Patches or Nicotine gum    Other Medications: None     Target Symptoms: Withdrawal and medication side effects. The following were  rated moderate (3) to severe (4) on TCRS:  · Moderate (3): Desire/crave tobacco  · Severe (4): None    Comments: Spoke with patient at length by telephone for a smoking cessation follow up. Patient still smoke free as of 12/7/2021. Patient states she feels she is ready for 7 mg Patches. The patient remains on the prescribed tobacco cessation medication regimen of 14 mg  Patches and 2 mg gum without any negative side effects at this time. The patient denies any abnormal behavioral or mental changes at this time. The patient will continue with  therapy sessions and medication monitoring by CTTS. Prescribed medication management will be by physician. completion of TCRS (Tobacco Cessation Rating Scale) reviewed strategies, habitual behavior, high risks situations, understanding urges and cravings, stress and relaxation with open discussion and additional interventions, Introduced lapses, relapses, understanding them and analyzing the situation of a lapse, conflict issues that may be linked to a lapse.       Diagnosis: F17.210    Next Visit: 2 weeks

## 2022-02-09 NOTE — Clinical Note
Spoke with patient at length by telephone for a smoking cessation follow up. Patient still smoke free as of 12/7/2021. Patient states she feels she is ready for 7 mg Patches. The patient remains on the prescribed tobacco cessation medication regimen of 14 mg  Patches and 2 mg gum without any negative side effects at this time. The patient denies any abnormal behavioral or mental changes at this time. The patient will continue with  therapy sessions and medication monitoring by CTTS. Prescribed medication management will be by physician. completion of TCRS (Tobacco Cessation Rating Scale) reviewed strategies, habitual behavior, high risks situations, understanding urges and cravings, stress and relaxation with open discussion and additional interventions, Introduced lapses, relapses, understanding them and analyzing the situation of a lapse, conflict issues that may be linked to a lapse.

## 2022-02-23 ENCOUNTER — CLINICAL SUPPORT (OUTPATIENT)
Dept: SMOKING CESSATION | Facility: CLINIC | Age: 42
End: 2022-02-23

## 2022-02-23 DIAGNOSIS — F17.210 CIGARETTE NICOTINE DEPENDENCE, UNCOMPLICATED: Primary | ICD-10-CM

## 2022-02-23 DIAGNOSIS — F17.210 MODERATE CIGARETTE SMOKER (10-19 PER DAY): ICD-10-CM

## 2022-02-23 PROCEDURE — 99999 PR PBB SHADOW E&M-EST. PATIENT-LVL II: CPT | Mod: PBBFAC,,,

## 2022-02-23 PROCEDURE — 99401 PREV MED CNSL INDIV APPRX 15: CPT | Mod: S$GLB,,,

## 2022-02-23 PROCEDURE — 99999 PR PBB SHADOW E&M-EST. PATIENT-LVL II: ICD-10-PCS | Mod: PBBFAC,,,

## 2022-02-23 PROCEDURE — 99401 PR PREVENT COUNSEL,INDIV,15 MIN: ICD-10-PCS | Mod: S$GLB,,,

## 2022-02-23 RX ORDER — MICONAZOLE NITRATE 2 %
2 CREAM (GRAM) TOPICAL
Qty: 100 EACH | Refills: 0 | Status: SHIPPED | OUTPATIENT
Start: 2022-02-23 | End: 2022-03-08 | Stop reason: SDUPTHER

## 2022-02-23 NOTE — Clinical Note
Spoke with patient at length for smoking cessation follow up. Pt still smoke free as 12/13/2021. The patient remains on the prescribed tobacco cessation medication regimen of 14 mg patch and 2 mg gum without any negative side effects at this time. The patient will continue with  therapy sessions and medication monitoring by CTTS. Prescribed medication management will be by physician. completion of TCRS (Tobacco Cessation Rating Scale) reviewed strategies, habitual behavior, high risks situations, understanding urges and cravings, stress and relaxation with open discussion and additional interventions, Introduced lapses, relapses, understanding them and analyzing the situation of a lapse, conflict issues that may be linked to a lapse

## 2022-02-23 NOTE — PROGRESS NOTES
Individual Follow-Up Form    2/23/2022    Quit Date:    Clinical Status of Patient: Outpatient    Length of Service: 15 minutes    Continuing Medication: yes  Patches or Nicotine gum    Other Medications:  None     Target Symptoms: Withdrawal and medication side effects. The following were  rated moderate (3) to severe (4) on TCRS:  · Moderate (3): Desire/crave tobacco  Severe (4): None    Comments: Spoke with patient at length for smoking cessation follow up. Pt still smoke free as 12/13/2021. The patient remains on the prescribed tobacco cessation medication regimen of 14 mg patch and 2 mg gum without any negative side effects at this time. The patient will continue with  therapy sessions and medication monitoring by CTTS. Prescribed medication management will be by physician. completion of TCRS (Tobacco Cessation Rating Scale) reviewed strategies, habitual behavior, high risks situations, understanding urges and cravings, stress and relaxation with open discussion and additional interventions, Introduced lapses, relapses, understanding them and analyzing the situation of a lapse, conflict issues that may be linked to a lapse.   agnosis: F17.210    Next Visit: 2 weeks

## 2022-03-06 ENCOUNTER — HOSPITAL ENCOUNTER (EMERGENCY)
Facility: HOSPITAL | Age: 42
Discharge: HOME | End: 2022-03-06
Attending: EMERGENCY MEDICINE
Payer: COMMERCIAL

## 2022-03-06 VITALS
WEIGHT: 120 LBS | SYSTOLIC BLOOD PRESSURE: 119 MMHG | HEIGHT: 65 IN | TEMPERATURE: 99 F | OXYGEN SATURATION: 99 % | HEART RATE: 63 BPM | BODY MASS INDEX: 19.99 KG/M2 | RESPIRATION RATE: 18 BRPM | DIASTOLIC BLOOD PRESSURE: 76 MMHG

## 2022-03-06 DIAGNOSIS — S05.11XA PERIORBITAL CONTUSION OF RIGHT EYE, INITIAL ENCOUNTER: ICD-10-CM

## 2022-03-06 DIAGNOSIS — H11.31 SUBCONJUNCTIVAL HEMORRHAGE OF RIGHT EYE: Primary | ICD-10-CM

## 2022-03-06 DIAGNOSIS — S09.90XA MINOR HEAD INJURY WITHOUT LOSS OF CONSCIOUSNESS, INITIAL ENCOUNTER: ICD-10-CM

## 2022-03-06 LAB
B-HCG UR QL: NEGATIVE
CTP QC/QA: YES

## 2022-03-06 PROCEDURE — 81025 URINE PREGNANCY TEST: CPT | Performed by: EMERGENCY MEDICINE

## 2022-03-06 PROCEDURE — 99284 EMERGENCY DEPT VISIT MOD MDM: CPT | Mod: 25

## 2022-03-06 PROCEDURE — 25000003 PHARM REV CODE 250: Performed by: EMERGENCY MEDICINE

## 2022-03-06 RX ORDER — MOXIFLOXACIN 5 MG/ML
1 SOLUTION/ DROPS OPHTHALMIC
Status: DISCONTINUED | OUTPATIENT
Start: 2022-03-06 | End: 2022-03-06

## 2022-03-06 RX ORDER — PROPARACAINE HYDROCHLORIDE 5 MG/ML
2 SOLUTION/ DROPS OPHTHALMIC
Status: DISCONTINUED | OUTPATIENT
Start: 2022-03-06 | End: 2022-03-06

## 2022-03-07 NOTE — ED PROVIDER NOTES
"Encounter Date: 3/6/2022       History     Chief Complaint   Patient presents with    Eye Injury     Pt c/o left eye swelling w/ blurred vision and left cheek swelling w/ pain. Also reports feeling like she's "on a cruise ship", denies dizziness stating she feels "a little off".     The patient is a 41-year-old female who was in the backyard playing baseball with her son and was struck in the right eye with a baseball.  She denies any loss of consciousness.  She has pain and swelling to her right eye and states her vision is mildly blurred.  She states she at times feels dizzy.  No other injuries.  She denies any neck pain.  She has a mild headache but denies any change in mental status.  She denies any nausea or vomiting.        Review of patient's allergies indicates:  No Known Allergies  Past Medical History:   Diagnosis Date    Epilepsy     Migraines      Past Surgical History:   Procedure Laterality Date     SECTION       History reviewed. No pertinent family history.  Social History     Tobacco Use    Smoking status: Former Smoker     Types: Cigarettes     Quit date: 2021     Years since quittin.2    Smokeless tobacco: Never Used   Substance Use Topics    Alcohol use: Not Currently     Review of Systems   Constitutional: Negative for fever.   HENT: Negative for sore throat.    Eyes: Positive for pain and redness.   Respiratory: Negative for shortness of breath.    Cardiovascular: Negative for chest pain.   Gastrointestinal: Negative for nausea.   Genitourinary: Negative for dysuria.   Musculoskeletal: Negative for back pain.   Skin: Negative for rash.   Neurological: Negative for weakness.   Hematological: Does not bruise/bleed easily.       Physical Exam     Initial Vitals [22]   BP Pulse Resp Temp SpO2   119/76 63 18 98.9 °F (37.2 °C) 99 %      MAP       --         Physical Exam    Nursing note and vitals reviewed.  Constitutional: She appears well-developed and " well-nourished.   HENT:   Nose: Nose normal.   Redness and swelling to the inferior aspect of the right orbital rim.   Eyes: EOM are normal. Pupils are equal, round, and reactive to light.   Mild strabismus noted, subconjunctival hemorrhage noted to the lateral conjunctiva of the right eye.   Neck: Neck supple.   Posterior neck is nontender and with full range of motion.   Normal range of motion.  Pulmonary/Chest: She exhibits no tenderness.   Abdominal: Abdomen is soft. There is no abdominal tenderness.   Musculoskeletal:         General: No tenderness or edema. Normal range of motion.      Cervical back: Normal range of motion and neck supple.     Neurological: She is alert and oriented to person, place, and time. She has normal strength and normal reflexes. GCS score is 15. GCS eye subscore is 4. GCS verbal subscore is 5. GCS motor subscore is 6.   Skin: Skin is warm and dry.   Psychiatric: She has a normal mood and affect. Her behavior is normal. Judgment and thought content normal.         ED Course   Procedures  Labs Reviewed   POCT URINE PREGNANCY          Imaging Results          CT Maxillofacial Without Contrast (Final result)  Result time 03/06/22 23:10:10    Final result by Jaswinder Meade MD (03/06/22 23:10:10)                 Impression:      No evidence of acute fracture or traumatic malalignment of the maxillofacial structures.    Trace mucosal thickening within the maxillary sinuses.      Electronically signed by: Jaswinder Meade MD  Date:    03/06/2022  Time:    23:10             Narrative:    EXAMINATION:  CT MAXILLOFACIAL WITHOUT CONTRAST    CLINICAL HISTORY:  Facial trauma, blunt;    TECHNIQUE:  Low dose axial images, sagittal and coronal reformations were obtained through the face.  Contrast was not administered.    COMPARISON:  None    FINDINGS:  The visualized intracranial compartment is within normal limits.  The orbits and intraorbital contents are within normal limits.  The orbital walls are  intact.    The walls of the paranasal sinuses are intact.  There is mucosal thickening within the bilateral maxillary sinuses.    The zygomatic arches are within normal limits.  The pterygoid plates are intact.  The mandibular condyles are within normal limits.  The nasal bones are unremarkable.  The mandible and maxilla are within normal limits.    The parotid and submandibular glands are within normal limits.  There is no evidence of lymphadenopathy in the neck.    The visualized portions of the upper cervical spine is within normal limits.                               CT Head Without Contrast (Final result)  Result time 03/06/22 23:05:36    Final result by Jaswinder Meade MD (03/06/22 23:05:36)                 Impression:      No acute intracranial process.      Electronically signed by: Jaswinder Meade MD  Date:    03/06/2022  Time:    23:05             Narrative:    EXAMINATION:  CT HEAD WITHOUT CONTRAST    CLINICAL HISTORY:  Facial trauma, blunt;    TECHNIQUE:  Low dose axial images were obtained through the head.  Coronal and sagittal reformations were also performed. Contrast was not administered.    COMPARISON:  None.    FINDINGS:  The subcutaneous tissues are unremarkable.  The bony calvarium is intact.  The paranasal sinuses are unremarkable.  The mastoid air cells are clear.  The orbits and intraorbital contents are within normal limits.    The craniocervical junction is intact.  The midline structures are unremarkable.  There are no extra-axial fluid collections.  There is no evidence of intracranial hemorrhage.  The ventricles and sulci are within normal limits.  The cisterns are unremarkable.  The gray-white differentiation is maintained.  There is no dense vessel sign.  There is no evidence of mass effect.                                 Medications - No data to display                       Clinical Impression:   Final diagnoses:  [H11.31] Subconjunctival hemorrhage of right eye (Primary)  [S05.11XA]  Periorbital contusion of right eye, initial encounter  [S09.90XA] Minor head injury without loss of consciousness, initial encounter          ED Disposition Condition    Discharge Stable        ED Prescriptions     None        Follow-up Information     Follow up With Specialties Details Why Contact Info Additional Information    [facility] Schedule an appointment as soon as possible for a visit   [address] [facility] [facility] [id number] [phone] Please park in surface lot and check in at the .           Thais Villar MD  03/06/22 [id number]

## 2022-03-08 ENCOUNTER — CLINICAL SUPPORT (OUTPATIENT)
Dept: SMOKING CESSATION | Facility: CLINIC | Age: 42
End: 2022-03-08
Payer: COMMERCIAL

## 2022-03-08 ENCOUNTER — TELEPHONE (OUTPATIENT)
Dept: SMOKING CESSATION | Facility: CLINIC | Age: 42
End: 2022-03-08
Payer: COMMERCIAL

## 2022-03-08 DIAGNOSIS — F17.210 MODERATE CIGARETTE SMOKER (10-19 PER DAY): ICD-10-CM

## 2022-03-08 DIAGNOSIS — F17.210 CIGARETTE NICOTINE DEPENDENCE, UNCOMPLICATED: Primary | ICD-10-CM

## 2022-03-08 PROCEDURE — 99401 PR PREVENT COUNSEL,INDIV,15 MIN: ICD-10-PCS | Mod: S$PBB,,,

## 2022-03-08 PROCEDURE — 99401 PREV MED CNSL INDIV APPRX 15: CPT | Mod: S$PBB,,,

## 2022-03-08 PROCEDURE — 99999 PR PBB SHADOW E&M-EST. PATIENT-LVL II: CPT | Mod: PBBFAC,,,

## 2022-03-08 PROCEDURE — 99999 PR PBB SHADOW E&M-EST. PATIENT-LVL II: ICD-10-PCS | Mod: PBBFAC,,,

## 2022-03-08 RX ORDER — MICONAZOLE NITRATE 2 %
2 CREAM (GRAM) TOPICAL
Qty: 100 EACH | Refills: 0 | Status: SHIPPED | OUTPATIENT
Start: 2022-03-08 | End: 2022-03-30 | Stop reason: SDUPTHER

## 2022-03-08 NOTE — PROGRESS NOTES
Individual Follow-Up Form    3/8/2022    Quit Date:     Clinical Status of Patient: Outpatient    Length of Service: 15 minutes    Continuing Medication: yes  Nicotine gum    Other Medications: none     Target Symptoms: Withdrawal and medication side effects. The following were  rated moderate (3) to severe (4) on TCRS:  · Moderate (3): desire/crave tobacco    Severe (4): none  Comments: Spoke with patient at length by telephone for a smoking cessation follow up. The patient remains on the prescribed tobacco cessation medication regimen of 2 mg gum without any negative side effects at this time. The patient will continue with  therapy sessions and medication monitoring by CTTS. Prescribed medication management will be by physician.    Diagnosis: F17.210    Next Visit: 1 week

## 2022-03-08 NOTE — Clinical Note
Spoke with patient at length by telephone for a smoking cessation follow up. The patient remains on the prescribed tobacco cessation medication regimen of 2 mg gum without any negative side effects at this time. The patient will continue with  therapy sessions and medication monitoring by CTTS. Prescribed medication management will be by physician.

## 2022-03-16 ENCOUNTER — CLINICAL SUPPORT (OUTPATIENT)
Dept: SMOKING CESSATION | Facility: CLINIC | Age: 42
End: 2022-03-16

## 2022-03-16 DIAGNOSIS — F17.210 CIGARETTE NICOTINE DEPENDENCE, UNCOMPLICATED: Primary | ICD-10-CM

## 2022-03-16 PROCEDURE — 99999 PR PBB SHADOW E&M-EST. PATIENT-LVL II: CPT | Mod: PBBFAC,,,

## 2022-03-16 PROCEDURE — 99401 PR PREVENT COUNSEL,INDIV,15 MIN: ICD-10-PCS | Mod: S$GLB,,,

## 2022-03-16 PROCEDURE — 99401 PREV MED CNSL INDIV APPRX 15: CPT | Mod: S$GLB,,,

## 2022-03-16 PROCEDURE — 99999 PR PBB SHADOW E&M-EST. PATIENT-LVL II: ICD-10-PCS | Mod: PBBFAC,,,

## 2022-03-16 NOTE — Clinical Note
Spoke with patient at length by telephone for a smoking cessation follow up. Patient still smoke free.Congratulated for her effort and success. The patient remains on the prescribed tobacco cessation medication regimen of 2 mg gum without any negative side effects at this time. The patient will continue with  therapy sessions and medicn monitoring by CTTS. Prescribed medication management will be by physician.

## 2022-03-16 NOTE — PROGRESS NOTES
Individual Follow-Up Form    3/16/2022    Quit Date:    Clinical Status of Patient: Outpatient    Length of Service: 15 minutes    Continuing Medication: yes  Nicotine gum    Other Medications: None     Target Symptoms: Withdrawal and medication side effects. The following were  rated moderate (3) to severe (4) on TCRS:  · Moderate (3): None  ·  Severe (4):  None  Comments:Spoke with patient at length by telephone for a smoking cessation follow up. Patient still smoke free.Congratulated for her effort and success. The patient remains on the prescribed tobacco cessation medication regimen of 2 mg gum without any negative side effects at this time. The patient will continue with  therapy sessions and medicn monitoring by CTTS. Prescribed medication management will be by physician.          Diagnosis: F17.210    Next Visit: 2 weeks

## 2022-03-29 ENCOUNTER — PATIENT MESSAGE (OUTPATIENT)
Dept: FAMILY MEDICINE | Facility: CLINIC | Age: 42
End: 2022-03-29
Payer: COMMERCIAL

## 2022-03-30 ENCOUNTER — CLINICAL SUPPORT (OUTPATIENT)
Dept: SMOKING CESSATION | Facility: CLINIC | Age: 42
End: 2022-03-30
Payer: COMMERCIAL

## 2022-03-30 ENCOUNTER — TELEPHONE (OUTPATIENT)
Dept: FAMILY MEDICINE | Facility: CLINIC | Age: 42
End: 2022-03-30
Payer: COMMERCIAL

## 2022-03-30 DIAGNOSIS — E78.5 HYPERLIPIDEMIA, UNSPECIFIED HYPERLIPIDEMIA TYPE: Primary | ICD-10-CM

## 2022-03-30 DIAGNOSIS — F17.210 MODERATE CIGARETTE SMOKER (10-19 PER DAY): ICD-10-CM

## 2022-03-30 DIAGNOSIS — F17.210 CIGARETTE NICOTINE DEPENDENCE, UNCOMPLICATED: Primary | ICD-10-CM

## 2022-03-30 PROCEDURE — 99401 PR PREVENT COUNSEL,INDIV,15 MIN: ICD-10-PCS | Mod: S$PBB,,,

## 2022-03-30 PROCEDURE — 99999 PR PBB SHADOW E&M-EST. PATIENT-LVL II: CPT | Mod: PBBFAC,,,

## 2022-03-30 PROCEDURE — 99999 PR PBB SHADOW E&M-EST. PATIENT-LVL II: ICD-10-PCS | Mod: PBBFAC,,,

## 2022-03-30 PROCEDURE — 99401 PREV MED CNSL INDIV APPRX 15: CPT | Mod: S$PBB,,,

## 2022-03-30 RX ORDER — MICONAZOLE NITRATE 2 %
2 CREAM (GRAM) TOPICAL
Qty: 100 EACH | Refills: 0 | Status: SHIPPED | OUTPATIENT
Start: 2022-03-30 | End: 2023-01-03

## 2022-03-30 RX ORDER — EZETIMIBE 10 MG/1
10 TABLET ORAL DAILY
Qty: 90 TABLET | Refills: 2 | Status: ON HOLD | OUTPATIENT
Start: 2022-03-30 | End: 2023-06-05 | Stop reason: HOSPADM

## 2022-03-30 NOTE — PROGRESS NOTES
Individual Follow-Up Form    3/30/2022    Quit Date: 12/7/2021   Clinical Status of Patient: Outpatient    Length of Service: 15 minutes    Continuing Medication: yes  Nicotine gum    Other Medications: none     Target Symptoms: Withdrawal and medication side effects. The following were  rated moderate (3) to severe (4) on TCRS:  · Moderate (3):none  · Severe (4): none    Comments: Spoke with patient at length by telephone for a smoking cessation follow up. Patient still smoke free. Congratulated patient for her success. The patient remains on the prescribed tobacco cessation medication regimen of 2 mg gum without any negative side effects at this time. The patient will continue with  therapy sessions and medication monitoring by CTTS. Prescribed medication management will be by physician.     Diagnosis: F17.210    Next Visit: 2 weeks

## 2022-04-08 ENCOUNTER — OFFICE VISIT (OUTPATIENT)
Dept: URGENT CARE | Facility: CLINIC | Age: 42
End: 2022-04-08
Payer: COMMERCIAL

## 2022-04-08 VITALS
RESPIRATION RATE: 18 BRPM | OXYGEN SATURATION: 98 % | SYSTOLIC BLOOD PRESSURE: 122 MMHG | WEIGHT: 120 LBS | TEMPERATURE: 98 F | HEIGHT: 65 IN | HEART RATE: 79 BPM | DIASTOLIC BLOOD PRESSURE: 73 MMHG | BODY MASS INDEX: 19.99 KG/M2
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
## 2022-04-08 DIAGNOSIS — M25.572 ACUTE LEFT ANKLE PAIN: Primary | ICD-10-CM

## 2022-04-08 PROCEDURE — 3008F BODY MASS INDEX DOCD: CPT | Mod: CPTII,S$GLB,, | Performed by: NURSE PRACTITIONER

## 2022-04-08 PROCEDURE — 3074F SYST BP LT 130 MM HG: CPT | Mod: CPTII,S$GLB,, | Performed by: NURSE PRACTITIONER

## 2022-04-08 PROCEDURE — 99213 PR OFFICE/OUTPT VISIT, EST, LEVL III, 20-29 MIN: ICD-10-PCS | Mod: S$GLB,,, | Performed by: NURSE PRACTITIONER

## 2022-04-08 PROCEDURE — 3008F PR BODY MASS INDEX (BMI) DOCUMENTED: ICD-10-PCS | Mod: CPTII,S$GLB,, | Performed by: NURSE PRACTITIONER

## 2022-04-08 PROCEDURE — 1159F MED LIST DOCD IN RCRD: CPT | Mod: CPTII,S$GLB,, | Performed by: NURSE PRACTITIONER

## 2022-04-08 PROCEDURE — 3074F PR MOST RECENT SYSTOLIC BLOOD PRESSURE < 130 MM HG: ICD-10-PCS | Mod: CPTII,S$GLB,, | Performed by: NURSE PRACTITIONER

## 2022-04-08 PROCEDURE — 3078F PR MOST RECENT DIASTOLIC BLOOD PRESSURE < 80 MM HG: ICD-10-PCS | Mod: CPTII,S$GLB,, | Performed by: NURSE PRACTITIONER

## 2022-04-08 PROCEDURE — 99213 OFFICE O/P EST LOW 20 MIN: CPT | Mod: S$GLB,,, | Performed by: NURSE PRACTITIONER

## 2022-04-08 PROCEDURE — 1159F PR MEDICATION LIST DOCUMENTED IN MEDICAL RECORD: ICD-10-PCS | Mod: CPTII,S$GLB,, | Performed by: NURSE PRACTITIONER

## 2022-04-08 PROCEDURE — 1160F RVW MEDS BY RX/DR IN RCRD: CPT | Mod: CPTII,S$GLB,, | Performed by: NURSE PRACTITIONER

## 2022-04-08 PROCEDURE — 3078F DIAST BP <80 MM HG: CPT | Mod: CPTII,S$GLB,, | Performed by: NURSE PRACTITIONER

## 2022-04-08 PROCEDURE — 73610 X-RAY EXAM OF ANKLE: CPT | Mod: FY,LT,S$GLB, | Performed by: RADIOLOGY

## 2022-04-08 PROCEDURE — 73610 XR ANKLE COMPLETE 3 VIEW LEFT: ICD-10-PCS | Mod: FY,LT,S$GLB, | Performed by: RADIOLOGY

## 2022-04-08 PROCEDURE — 1160F PR REVIEW ALL MEDS BY PRESCRIBER/CLIN PHARMACIST DOCUMENTED: ICD-10-PCS | Mod: CPTII,S$GLB,, | Performed by: NURSE PRACTITIONER

## 2022-04-08 NOTE — PATIENT INSTRUCTIONS
- You must understand that you have received an Urgent Care treatment only and that you may be released before all of your medical problems are known or treated.   - You, the patient, will arrange for follow up care as instructed.   - If your condition worsens or fails to improve we recommend that you receive another evaluation at the ER immediately or contact your PCP to discuss your concerns.   - You can call [phone] or [phone] to help schedule an appointment with the appropriate provider.    Tylenol or ibuprofen for the pain--you can take up to 4 over the counter ibuprofen and 2 over the counter tylenol every 8 hours. Max 4 grams of tylenol per day and 3200 mg of ibuprofen per day.   Keep brace on for support and compression  Rest, ice, and elevate the affected area 3-4 times per day for 15-20 minutes

## 2022-04-08 NOTE — PROGRESS NOTES
"Subjective:       Patient ID: Marilu Frausto is a 41 y.o. female.    Vitals:  height is 5' 5" (1.651 m) and weight is 54.4 kg (120 lb). Her temperature is 97.7 °F (36.5 °C). Her blood pressure is 122/73 and her pulse is 79. Her respiration is 18 and oxygen saturation is 98%.     Chief Complaint: Fall    Patient is a 42 yo female who stated that she injured her left ankle by fallin in her home 9 days ago. Pt stated that her living room has inverted floors and she stepped down wrong and when she feel she twisted/ rolled her left ankle . She has been taking ibuprofen, icing her ankle, and wearing a brace with no improvement. Pt states she feels like the swelling is only getting worse. pain and swelling is worse in the evening. Pts pharmacy has been updated in her chart .     Fall  The accident occurred more than 1 week ago. The fall occurred while walking. She fell from a height of 1 to 2 ft. She landed on hard floor. There was no blood loss. The pain is present in the left lower leg. The pain is at a severity of 4/10. The pain is moderate. The symptoms are aggravated by pressure on injury, rotation, use of injured limb, standing and movement. Pertinent negatives include no abdominal pain, bowel incontinence, fever, headaches, hearing loss, hematuria, loss of consciousness, nausea, numbness, tingling, visual change or vomiting. She has tried ice and NSAID for the symptoms. The treatment provided mild relief.       Constitution: Negative for fever.   Gastrointestinal: Negative for abdominal pain, nausea, vomiting and bowel incontinence.   Genitourinary: Negative for hematuria.   Neurological: Negative for headaches, loss of consciousness and numbness.       Objective:      Physical Exam   Constitutional: She is oriented to person, place, and time. She appears well-developed. She is cooperative.  Non-toxic appearance. She does not appear ill. No distress.   HENT:   Head: Normocephalic and atraumatic.   Ears:   Right " Ear: Hearing, tympanic membrane, external ear and ear canal normal.   Left Ear: Hearing, tympanic membrane, external ear and ear canal normal.   Nose: Nose normal. No mucosal edema, rhinorrhea or nasal deformity. No epistaxis. Right sinus exhibits no maxillary sinus tenderness and no frontal sinus tenderness. Left sinus exhibits no maxillary sinus tenderness and no frontal sinus tenderness.   Mouth/Throat: Uvula is midline, oropharynx is clear and moist and mucous membranes are normal. No trismus in the jaw. Normal dentition. No uvula swelling.   Eyes: Conjunctivae and lids are normal. Right eye exhibits no discharge. Left eye exhibits no discharge. No scleral icterus.   Neck: Trachea normal and phonation normal. Neck supple.   Cardiovascular: Normal rate, regular rhythm and normal pulses.   Pulmonary/Chest: Effort normal. No respiratory distress.   Abdominal: Normal appearance.   Musculoskeletal:         General: No deformity.      Left ankle: She exhibits decreased range of motion (pain w/ plantar flexion, inversion, eversion) and swelling. She exhibits no ecchymosis. Tenderness. Lateral malleolus tenderness found.   Neurological: She is alert and oriented to person, place, and time. She exhibits normal muscle tone. Coordination normal.   Skin: Skin is warm, dry, intact, not diaphoretic and not pale.   Psychiatric: Her speech is normal and behavior is normal. Judgment and thought content normal.   Nursing note and vitals reviewed.     X-Ray Ankle Complete 3 View Left    Result Date: 4/8/2022  EXAMINATION: XR ANKLE COMPLETE 3 VIEW LEFT TECHNIQUE: Three views of the left ankle were obtained, with AP, lateral, and oblique projections submitted. COMPARISON: No prior relevant examinations are currently available for comparison purposes.  Clinical information obtained from the electronic medical record indicates recent trauma, occurring over a week ago. FINDINGS: Mild soft tissue swelling about the lateral malleolus is  identified.  Visualized osseous structures appear unremarkable, with no evidence of recent fracture appreciated.  No lytic destructive process.  No osteochondral defect.  Tibiotalar joint space appears unremarkable.     Mild soft tissue swelling about the lateral malleolus.  No evidence of recent fracture or other significant osseous abnormality. Electronically signed by: Daron Vazquez MD Date:    04/08/2022 Time:    13:22          Assessment:       1. Acute left ankle pain    2. Fall, initial encounter          Plan:         Acute left ankle pain  -     Ambulatory referral/consult to Orthopedics    Fall, initial encounter  -     X-Ray Ankle Complete 3 View Left; Future; Expected date: 04/08/2022         Patient Instructions   - You must understand that you have received an Urgent Care treatment only and that you may be released before all of your medical problems are known or treated.   - You, the patient, will arrange for follow up care as instructed.   - If your condition worsens or fails to improve we recommend that you receive another evaluation at the ER immediately or contact your PCP to discuss your concerns.   - You can call [phone] or [phone] to help schedule an appointment with the appropriate provider.    Tylenol or ibuprofen for the pain--you can take up to 4 over the counter ibuprofen and 2 over the counter tylenol every 8 hours. Max 4 grams of tylenol per day and 3200 mg of ibuprofen per day.   Keep brace on for support and compression  Rest, ice, and elevate the affected area 3-4 times per day for 15-20 minutes

## 2022-04-20 ENCOUNTER — OFFICE VISIT (OUTPATIENT)
Dept: PODIATRY | Facility: CLINIC | Age: 42
End: 2022-04-20
Payer: COMMERCIAL

## 2022-04-20 VITALS
HEART RATE: 76 BPM | DIASTOLIC BLOOD PRESSURE: 72 MMHG | HEIGHT: 65 IN | SYSTOLIC BLOOD PRESSURE: 109 MMHG | BODY MASS INDEX: 19.97 KG/M2

## 2022-04-20 DIAGNOSIS — M25.572 ACUTE LEFT ANKLE PAIN: Primary | ICD-10-CM

## 2022-04-20 PROCEDURE — 3078F PR MOST RECENT DIASTOLIC BLOOD PRESSURE < 80 MM HG: ICD-10-PCS | Mod: CPTII,S$GLB,, | Performed by: STUDENT IN AN ORGANIZED HEALTH CARE EDUCATION/TRAINING PROGRAM

## 2022-04-20 PROCEDURE — 3074F SYST BP LT 130 MM HG: CPT | Mod: CPTII,S$GLB,, | Performed by: STUDENT IN AN ORGANIZED HEALTH CARE EDUCATION/TRAINING PROGRAM

## 2022-04-20 PROCEDURE — 99999 PR PBB SHADOW E&M-EST. PATIENT-LVL III: CPT | Mod: PBBFAC,,, | Performed by: STUDENT IN AN ORGANIZED HEALTH CARE EDUCATION/TRAINING PROGRAM

## 2022-04-20 PROCEDURE — 3078F DIAST BP <80 MM HG: CPT | Mod: CPTII,S$GLB,, | Performed by: STUDENT IN AN ORGANIZED HEALTH CARE EDUCATION/TRAINING PROGRAM

## 2022-04-20 PROCEDURE — 99203 PR OFFICE/OUTPT VISIT, NEW, LEVL III, 30-44 MIN: ICD-10-PCS | Mod: S$GLB,,, | Performed by: STUDENT IN AN ORGANIZED HEALTH CARE EDUCATION/TRAINING PROGRAM

## 2022-04-20 PROCEDURE — 1159F MED LIST DOCD IN RCRD: CPT | Mod: CPTII,S$GLB,, | Performed by: STUDENT IN AN ORGANIZED HEALTH CARE EDUCATION/TRAINING PROGRAM

## 2022-04-20 PROCEDURE — 99999 PR PBB SHADOW E&M-EST. PATIENT-LVL III: ICD-10-PCS | Mod: PBBFAC,,, | Performed by: STUDENT IN AN ORGANIZED HEALTH CARE EDUCATION/TRAINING PROGRAM

## 2022-04-20 PROCEDURE — 3008F PR BODY MASS INDEX (BMI) DOCUMENTED: ICD-10-PCS | Mod: CPTII,S$GLB,, | Performed by: STUDENT IN AN ORGANIZED HEALTH CARE EDUCATION/TRAINING PROGRAM

## 2022-04-20 PROCEDURE — 99203 OFFICE O/P NEW LOW 30 MIN: CPT | Mod: S$GLB,,, | Performed by: STUDENT IN AN ORGANIZED HEALTH CARE EDUCATION/TRAINING PROGRAM

## 2022-04-20 PROCEDURE — 3008F BODY MASS INDEX DOCD: CPT | Mod: CPTII,S$GLB,, | Performed by: STUDENT IN AN ORGANIZED HEALTH CARE EDUCATION/TRAINING PROGRAM

## 2022-04-20 PROCEDURE — 1159F PR MEDICATION LIST DOCUMENTED IN MEDICAL RECORD: ICD-10-PCS | Mod: CPTII,S$GLB,, | Performed by: STUDENT IN AN ORGANIZED HEALTH CARE EDUCATION/TRAINING PROGRAM

## 2022-04-20 PROCEDURE — 3074F PR MOST RECENT SYSTOLIC BLOOD PRESSURE < 130 MM HG: ICD-10-PCS | Mod: CPTII,S$GLB,, | Performed by: STUDENT IN AN ORGANIZED HEALTH CARE EDUCATION/TRAINING PROGRAM

## 2022-04-20 NOTE — PROGRESS NOTES
Subjective:      Patient ID: Marilu Frausto is a 41 y.o. female.    Chief Complaint: Foot Problem (Stepped down wrong and hurt her left ankle in March, went to urgent care, xray done) and Ankle Pain (Left ankle is hurting her, swelling, hurts more at night time)     Marilu is a 41 y.o. female who presents to the podiatry clinic  with complaint of  left foot pain. Onset of the symptoms was several weeks ago. Precipitating event: injured left ankle. Current symptoms include: pain deep in ankle joint when she point her toes she feels it more. Aggravating factors: pointing toes. Symptoms have gradually worsened. Patient has had no prior foot problems. Evaluation to date: plain films: normal. Treatment to date: wearing brace which is not helping discomfort. Patients rates pain 8/10 on pain scale.        Review of Systems   Constitutional: Negative for chills, decreased appetite, diaphoresis and fever.   HENT: Negative for congestion and hearing loss.    Cardiovascular: Negative for chest pain, claudication, leg swelling and syncope.   Respiratory: Negative for cough and shortness of breath.    Skin: Negative for color change, dry skin, flushing, itching, nail changes, poor wound healing and rash.   Musculoskeletal: Positive for joint pain. Negative for arthritis, back pain and joint swelling.   Gastrointestinal: Negative for nausea and vomiting.   Neurological: Negative for focal weakness, numbness, paresthesias and weakness.   Psychiatric/Behavioral: Negative for altered mental status. The patient is not nervous/anxious.            Objective:      Physical Exam  Constitutional:       General: She is not in acute distress.     Appearance: She is well-developed. She is not diaphoretic.   Cardiovascular:      Comments: Dorsalis pedis and posterior tibial pulses are within normal limits. Skin temperature is within normal limits. Toes are cool to touch and feet are warm proximally. Hair growth is within normal limits.  Skin is normotrophic and without hyperpigmentation. No edema noted. No spider veins or varicosities noted, bilaterally.     Musculoskeletal:      Comments: Adequate joint range of motion without pain, limitation, nor crepitation to bilateral feet and ankle joints. Muscle strength is 5/5 in all groups bilaterally.    No tenderness to ATFL or CFL. Mild tenderness along peroneal tendon and to anterior joint line of left lower extremity    Lymphadenopathy:      Comments: Negative lymphangitic streaking    Skin:     General: Skin is warm and dry.      Findings: No lesion.      Comments: Skin is warm and dry, no acute signs of infection noted. No open wounds, macerations or hyperkeratotic lesions, bilaterally.     Toenails are well trimmed and of normal morphology, bilaterally.      Neurological:      Mental Status: She is alert and oriented to person, place, and time.      Sensory: No sensory deficit.      Motor: No abnormal muscle tone.      Comments: Light touch within normal limits.   Psychiatric:         Behavior: Behavior normal.         Thought Content: Thought content normal.         Judgment: Judgment normal.               Assessment:       Encounter Diagnosis   Name Primary?    Acute left ankle pain Yes         Plan:       Marilu was seen today for foot problem and ankle pain.    Diagnoses and all orders for this visit:    Acute left ankle pain  -     MRI Ankle With Contrast Left; Future      I counseled the patient on her conditions, their implications and medical management.    Xray reviewed, MRI ordered  Tall boot dispensed, pt to wear at all times while ambulating  RICE, OTC NSAIDs PRN pain  RTC in 1 month after obtaining MRI, sooner PRN

## 2022-04-21 ENCOUNTER — CLINICAL SUPPORT (OUTPATIENT)
Dept: SMOKING CESSATION | Facility: CLINIC | Age: 42
End: 2022-04-21

## 2022-04-21 DIAGNOSIS — F17.200 NICOTINE DEPENDENCE: Primary | ICD-10-CM

## 2022-04-21 PROCEDURE — 99999 PR PBB SHADOW E&M-EST. PATIENT-LVL I: ICD-10-PCS | Mod: PBBFAC,,,

## 2022-04-21 PROCEDURE — 99407 PR TOBACCO USE CESSATION INTENSIVE >10 MINUTES: ICD-10-PCS | Mod: S$GLB,,,

## 2022-04-21 PROCEDURE — 99407 BEHAV CHNG SMOKING > 10 MIN: CPT | Mod: S$GLB,,,

## 2022-04-21 PROCEDURE — 99999 PR PBB SHADOW E&M-EST. PATIENT-LVL I: CPT | Mod: PBBFAC,,,

## 2022-04-21 NOTE — PROGRESS NOTES
Called pt to f/u on her 3 month smoking cessation quit status. Pt stated she remains tobacco free since 12/13/21. Congratulated her on her hard work and success. Informed her of benefit period, phone follow ups, and contact information. Will complete smart form for 3 months and will continue to follow up on quit #1 episode.

## 2022-05-02 ENCOUNTER — HOSPITAL ENCOUNTER (OUTPATIENT)
Dept: RADIOLOGY | Facility: HOSPITAL | Age: 42
Discharge: HOME | End: 2022-05-02
Attending: STUDENT IN AN ORGANIZED HEALTH CARE EDUCATION/TRAINING PROGRAM
Payer: COMMERCIAL

## 2022-05-02 DIAGNOSIS — M25.572 ACUTE LEFT ANKLE PAIN: ICD-10-CM

## 2022-05-02 PROCEDURE — 73723 MRI JOINT LWR EXTR W/O&W/DYE: CPT | Mod: TC,LT

## 2022-05-02 PROCEDURE — 73723 MRI ANKLE W WO CONTRAST LEFT: ICD-10-PCS | Mod: 26,LT,, | Performed by: RADIOLOGY

## 2022-05-02 PROCEDURE — A9585 GADOBUTROL INJECTION: HCPCS | Performed by: STUDENT IN AN ORGANIZED HEALTH CARE EDUCATION/TRAINING PROGRAM

## 2022-05-02 PROCEDURE — 73723 MRI JOINT LWR EXTR W/O&W/DYE: CPT | Mod: 26,LT,, | Performed by: RADIOLOGY

## 2022-05-02 PROCEDURE — 25500020 PHARM REV CODE 255: Performed by: STUDENT IN AN ORGANIZED HEALTH CARE EDUCATION/TRAINING PROGRAM

## 2022-05-02 RX ORDER — GADOBUTROL 604.72 MG/ML
7 INJECTION INTRAVENOUS
Status: COMPLETED | OUTPATIENT
Start: 2022-05-02 | End: 2022-05-02

## 2022-05-02 RX ADMIN — GADOBUTROL 7 ML: 604.72 INJECTION INTRAVENOUS at 06:05

## 2022-05-05 ENCOUNTER — TELEPHONE (OUTPATIENT)
Dept: PODIATRY | Facility: CLINIC | Age: 42
End: 2022-05-05
Payer: COMMERCIAL

## 2022-05-05 NOTE — TELEPHONE ENCOUNTER
----- Message from Thais Yadav DPM sent at 5/5/2022  1:57 PM CDT -----  Can we have her follow up with Dr. Martin? She is considering surgery for continued ankle pain. MRI previously done

## 2022-05-05 NOTE — TELEPHONE ENCOUNTER
Spoke with Ms Lisbet in regards to scheduling her an appt with Dr. Martin for a surgical consult as referred from Dr. Yadav. Appt date and time of 6/17/22 at 4:15 pm accepted with no other needs voiced at this time. Encouraged to call if further assistance is needed.

## 2022-06-17 ENCOUNTER — OFFICE VISIT (OUTPATIENT)
Dept: PODIATRY | Facility: CLINIC | Age: 42
End: 2022-06-17
Payer: COMMERCIAL

## 2022-06-17 VITALS
HEART RATE: 73 BPM | BODY MASS INDEX: 19.97 KG/M2 | SYSTOLIC BLOOD PRESSURE: 99 MMHG | DIASTOLIC BLOOD PRESSURE: 60 MMHG | HEIGHT: 65 IN

## 2022-06-17 DIAGNOSIS — M25.372 ANKLE JOINT INSTABILITY, LEFT: ICD-10-CM

## 2022-06-17 DIAGNOSIS — S93.402S MODERATE LEFT ANKLE SPRAIN, SEQUELA: Primary | ICD-10-CM

## 2022-06-17 PROCEDURE — 99214 PR OFFICE/OUTPT VISIT, EST, LEVL IV, 30-39 MIN: ICD-10-PCS | Mod: S$GLB,,, | Performed by: PODIATRIST

## 2022-06-17 PROCEDURE — 3074F SYST BP LT 130 MM HG: CPT | Mod: CPTII,S$GLB,, | Performed by: PODIATRIST

## 2022-06-17 PROCEDURE — 99214 OFFICE O/P EST MOD 30 MIN: CPT | Mod: S$GLB,,, | Performed by: PODIATRIST

## 2022-06-17 PROCEDURE — 3008F PR BODY MASS INDEX (BMI) DOCUMENTED: ICD-10-PCS | Mod: CPTII,S$GLB,, | Performed by: PODIATRIST

## 2022-06-17 PROCEDURE — 1160F RVW MEDS BY RX/DR IN RCRD: CPT | Mod: CPTII,S$GLB,, | Performed by: PODIATRIST

## 2022-06-17 PROCEDURE — 3008F BODY MASS INDEX DOCD: CPT | Mod: CPTII,S$GLB,, | Performed by: PODIATRIST

## 2022-06-17 PROCEDURE — 99999 PR PBB SHADOW E&M-EST. PATIENT-LVL III: ICD-10-PCS | Mod: PBBFAC,,, | Performed by: PODIATRIST

## 2022-06-17 PROCEDURE — 1159F MED LIST DOCD IN RCRD: CPT | Mod: CPTII,S$GLB,, | Performed by: PODIATRIST

## 2022-06-17 PROCEDURE — 1160F PR REVIEW ALL MEDS BY PRESCRIBER/CLIN PHARMACIST DOCUMENTED: ICD-10-PCS | Mod: CPTII,S$GLB,, | Performed by: PODIATRIST

## 2022-06-17 PROCEDURE — 3078F DIAST BP <80 MM HG: CPT | Mod: CPTII,S$GLB,, | Performed by: PODIATRIST

## 2022-06-17 PROCEDURE — 3078F PR MOST RECENT DIASTOLIC BLOOD PRESSURE < 80 MM HG: ICD-10-PCS | Mod: CPTII,S$GLB,, | Performed by: PODIATRIST

## 2022-06-17 PROCEDURE — 99999 PR PBB SHADOW E&M-EST. PATIENT-LVL III: CPT | Mod: PBBFAC,,, | Performed by: PODIATRIST

## 2022-06-17 PROCEDURE — 3074F PR MOST RECENT SYSTOLIC BLOOD PRESSURE < 130 MM HG: ICD-10-PCS | Mod: CPTII,S$GLB,, | Performed by: PODIATRIST

## 2022-06-17 PROCEDURE — 1159F PR MEDICATION LIST DOCUMENTED IN MEDICAL RECORD: ICD-10-PCS | Mod: CPTII,S$GLB,, | Performed by: PODIATRIST

## 2022-06-17 NOTE — PROGRESS NOTES
"Subjective:      Patient ID: Marilu Frausto is a 41 y.o. female.    Chief Complaint: Foot Problem (Left ankle, eval surgery) and Ankle Pain (left)    Referred by Dr. Yadav for possible surgical evaluation of chronic pain to left ankle after sustaining a left ankle sprain approximately 3 months ago.  Relates that she has moderate pain when she attempts to point the foot down.  Pain is aggravated by standing and walking.  She has been wearing orthopedic boot.  Relates that she has 2 small children and is fairly active.    Vitals:    22 1610   BP: 99/60   Pulse: 73   Height: 5' 5" (1.651 m)   PainSc:   4   PainLoc: Ankle      Past Medical History:   Diagnosis Date    Epilepsy     Migraines        Past Surgical History:   Procedure Laterality Date     SECTION         History reviewed. No pertinent family history.    Social History     Socioeconomic History    Marital status:    Tobacco Use    Smoking status: Former Smoker     Types: Cigarettes     Quit date: 2021     Years since quittin.5    Smokeless tobacco: Never Used   Substance and Sexual Activity    Alcohol use: Not Currently       Current Outpatient Medications   Medication Sig Dispense Refill    AJOVY 225 mg/1.5 mL injection INJECT 1 SYRINGE INTO THE SKIN ONCE A MONTH      ezetimibe (ZETIA) 10 mg tablet Take 1 tablet (10 mg total) by mouth once daily. 90 tablet 2    INNOPRAN  mg Cp24 TK 1 C PO QAM  6    nicotine, polacrilex, (NICORETTE) 2 mg Gum Take 1 each (2 mg total) by mouth as needed (as needed). Can take 1-2 per hour in place of a cigarette. 100 each 0    TEGRETOL  mg 12 hr tablet TK 3 TS PO BID  6    fluticasone propionate (FLONASE) 50 mcg/actuation nasal spray 2 sprays (100 mcg total) by Each Nostril route once daily. 1 Bottle 0    nicotine (NICODERM CQ) 7 mg/24 hr Place 1 patch onto the skin once daily. 28 patch 0     No current facility-administered medications for this visit.       Review " of patient's allergies indicates:  No Known Allergies      Review of Systems   Constitutional: Negative for chills, fever and malaise/fatigue.   HENT: Negative for congestion and hearing loss.    Cardiovascular: Negative for chest pain, claudication and leg swelling.   Respiratory: Negative for cough and shortness of breath.    Skin: Negative for color change.   Musculoskeletal: Positive for joint pain. Negative for back pain, muscle cramps and muscle weakness.   Gastrointestinal: Negative for nausea and vomiting.   Neurological: Negative for numbness, paresthesias and weakness.   Psychiatric/Behavioral: Negative for altered mental status.           Objective:      Physical Exam  Vitals reviewed.   Constitutional:       General: She is not in acute distress.     Appearance: Normal appearance. She is not ill-appearing.   Cardiovascular:      Pulses:           Dorsalis pedis pulses are 2+ on the right side and 2+ on the left side.        Posterior tibial pulses are 2+ on the right side and 2+ on the left side.   Musculoskeletal:      Comments: Localized pain on palpation overlying the anterior lateral aspect of the left ankle in the region of the ATF ligament.  Moderate guarding and pain with attempted anterior drawer test left ankle noting partial subluxation instability.  There is no pain with stress inversion or eversion of the left ankle.  No pain with passive range of motion to left ankle other than and plantar flexion.  Slight pain on palpation posterior to the lateral malleolus over the peroneal tendon that is not aggravated by active eversion of the left foot in the neutral or plantar flexed position.  Range of motion to left ankle is within normal limits.  No pain with subtalar joint range of motion and no instability noted.   Skin:     General: Skin is warm.      Capillary Refill: Capillary refill takes less than 2 seconds.      Findings: No ecchymosis or erythema.      Nails: There is no clubbing.    Neurological:      Mental Status: She is alert and oriented to person, place, and time.      Sensory: Sensation is intact.      Motor: Motor function is intact.               Assessment:       Encounter Diagnoses   Name Primary?    Moderate left ankle sprain, sequela Yes    Ankle joint instability, left          Plan:       Marilu was seen today for foot problem and ankle pain.    Diagnoses and all orders for this visit:    Moderate left ankle sprain, sequela  -     Ambulatory referral/consult to Physical/Occupational Therapy; Future    Ankle joint instability, left  -     Ambulatory referral/consult to Physical/Occupational Therapy; Future      I counseled the patient on her conditions, their implications and medical management.    EXAMINATION:  MRI ANKLE W WO CONTRAST LEFT     CLINICAL HISTORY:  42 y/o FAnkle trauma, continued pain > 1wk, neg xray (Age >= 5y);.     COMPARISON:  04/08/2022 radiographs     TECHNIQUE:  Imaging was performed multiplanar, multisequence MR images of the  left ankle were obtained.     FINDINGS:  There is no osseous fracture.     Osseous edema of the medial talus near the insertion of the posterior deep fibers of the deltoid ligament.     There is a trace of fluid in the tibiotalar joint and posterior to the subtalar joint.     The anterior and posterior tibiofibular ligaments are intact.     The posterior talofibular ligament is intact.     The anterior talofibular ligament is thickened and ill define suggestive of a high-grade partial tear.  There is adjacent soft tissue edema.     The calcaneofibular ligament appears to be intact.     There is a low-grade tear of the posterior tibiotalar part of the deltoid ligament.  There is osseous edema at its attachment on the medial talus.     The anterior tibiocalcaneal and tibiotalar parts of the deltoid ligament are intact.     The spring ligament is intact.     The Lisfranc ligament is intact.     The peroneus brevis and longus tendons  appear intact.     The flexor and extensor tendons are intact.     Subcutaneous soft tissue edema overlying the lateral malleolus.     Impression:     There is a high-grade tear of the anterior talofibular ligament.  There is adjacent/overlying soft tissue edema.     There is a low grade partial tear or sprain of the posterior tibiotalar part of the deep deltoid ligament with associated osseous edema at its insertion on the medial talus.        Electronically signed by: Maryse White MD  Date:                                            05/03/2022  Time:                                           10:45    Reviewed MRI findings detail the patient.  Patient has lingering pain overlying the anterior talofibular ligament with instability of the left ankle.    We discussed continued conservative care such as bracing and physical therapy with activity modifications versus surgical intervention consisting primary reconstruction of the anterior talofibular ligament.  Risks, benefits anticipate postop course discussed in detail.  No guarantees were given or implied.  At this time the patient feels as though she would like to continue conservative care secondary to increased demand from her young children.  We discussed referral to physical therapy for gradual strengthening of the left lower extremity and proprioceptive exercises.  I would recommend bracing the left ankle further for an additional 4-6 weeks followed by gradual weaning out of the brace if she improves.  If she does not continue to improve then we discussed reassessing for surgical intervention.    Dispense, fitted and applied left ankle brace.    RTC within 8-10 weeks or p.r.n. as discussed.    A portion of this note was generated by voice recognition software and may contain spelling and grammar errors.    .

## 2022-06-21 ENCOUNTER — CLINICAL SUPPORT (OUTPATIENT)
Dept: REHABILITATION | Facility: HOSPITAL | Age: 42
End: 2022-06-21
Attending: PODIATRIST
Payer: COMMERCIAL

## 2022-06-21 DIAGNOSIS — R29.898 DECREASED STRENGTH OF LOWER EXTREMITY: ICD-10-CM

## 2022-06-21 DIAGNOSIS — R26.9 GAIT ABNORMALITY: ICD-10-CM

## 2022-06-21 DIAGNOSIS — S93.402S MODERATE LEFT ANKLE SPRAIN, SEQUELA: ICD-10-CM

## 2022-06-21 DIAGNOSIS — M25.372 ANKLE JOINT INSTABILITY, LEFT: ICD-10-CM

## 2022-06-21 DIAGNOSIS — M25.672 DECREASED RANGE OF MOTION OF LEFT ANKLE: ICD-10-CM

## 2022-06-21 PROCEDURE — 97161 PT EVAL LOW COMPLEX 20 MIN: CPT | Mod: PN

## 2022-06-22 ENCOUNTER — CLINICAL SUPPORT (OUTPATIENT)
Dept: SMOKING CESSATION | Facility: CLINIC | Age: 42
End: 2022-06-22
Payer: COMMERCIAL

## 2022-06-22 DIAGNOSIS — F17.200 NICOTINE DEPENDENCE: Primary | ICD-10-CM

## 2022-06-22 PROCEDURE — 99999 PR PBB SHADOW E&M-EST. PATIENT-LVL I: ICD-10-PCS | Mod: PBBFAC,,,

## 2022-06-22 PROCEDURE — 99407 BEHAV CHNG SMOKING > 10 MIN: CPT | Mod: S$GLB,,, | Performed by: GENERAL PRACTICE

## 2022-06-22 PROCEDURE — 99407 PR TOBACCO USE CESSATION INTENSIVE >10 MINUTES: ICD-10-PCS | Mod: S$GLB,,, | Performed by: GENERAL PRACTICE

## 2022-06-22 PROCEDURE — 99999 PR PBB SHADOW E&M-EST. PATIENT-LVL I: CPT | Mod: PBBFAC,,,

## 2022-06-22 NOTE — PROGRESS NOTES
Spoke with patient today in regard to smoking cessation progress 6 month telephone follow up, she states that she is tobacco free.  Commended patient on the accomplishments thus far.  Informed patient of benefit period, future follow up, and contact information if any further help or support is needed.  Will complete smart form for 6 month follow up on Quit attempt #1

## 2022-06-24 NOTE — PLAN OF CARE
"[facility]  Physical Therapy Initial Evaluation    Name: Marilu Frausto  Clinic Number: [id number]    Therapy Diagnosis:   Encounter Diagnoses   Name Primary?    Moderate left ankle sprain, sequela     Ankle joint instability, left     Gait abnormality     Decreased range of motion of left ankle     Decreased strength of lower extremity      Physician: Cory Martin DPM    Physician Orders: PT Eval and Treat   Medical Diagnosis from Referral:   S93.402S (ICD-10-CM) - Moderate left ankle sprain, sequela   M25.372 (ICD-10-CM) - Ankle joint instability, left     Evaluation Date: 6/21/2022  Authorization Period Expiration: 06/17/2023  Plan of Care Expiration: 08/02/2022  Progress Note Due: 07/19/2022  Visit # / Visits authorized: 1/ 1   FOTO: Not given at eval, will give at 1st follow up.    Precautions: Standard     Time In: 0215 pm  Time Out: 0300 pm  Total Appointment Time (timed & untimed codes): 45 minutes      SUBJECTIVE     Date of onset: 3 months    History of current condition - Marilu reports: in early April ago she sprained her ankle when stepping down into her sunken living room. She was placed in a CAM boot from that time until last week. She reports that she was pain free throughout the time she was in the CAM boot. She now presents in semi-rigid brace given to her by her podiatrist and has no pain with weight bearing, but does not pain with plantar flexion, especially when kicking in a pool. She also reports pain with regular swelling and feelings of instability despite brace. She denies numbness/tingling, etc. She also reports history of at least 3 prior ankle sprains.     Falls: none since injury    Imaging, MRI studies: Impression: "There is a high-grade tear of the anterior talofibular ligament.  There is adjacent/overlying soft tissue edema.  There is a low grade partial tear or sprain of the posterior tibiotalar part of the deep deltoid ligament with " "associated osseous edema at its insertion on the medial talus."    Prior Therapy: yes for shoulder about 5 years ago  Social History:  lives with their family in single story home with 1 step into living room  Occupation: works from home, and spends about 60% of her time sitting  Prior Level of Function: no limitations  Current Level of Function: unable to walking as much as she wants to and has pain when swimming    Pain:  Current 0/10, worst 8/10, best 0/10   Location: left ankles   Description: Throbbing and Tight  Aggravating Factors: Walking and Night Time  Easing Factors: ice    Patients goals: to get back to playing with her kids     Medical History:   Past Medical History:   Diagnosis Date    Epilepsy     Migraines        Surgical History:   Marilu Frausto  has a past surgical history that includes  section.    Medications:   Marilu has a current medication list which includes the following prescription(s): ajovy syringe, ezetimibe, innopran xl, nicotine (polacrilex), and tegretol xr.    Allergies:   Review of patient's allergies indicates:  No Known Allergies     Objective       Gait:  Early heel of on L, decreased step length on R.     Functional Tests:   SLS EO: poor balance on L and increased pain, moderate balance on R  Functional Squat: anterior chain dominant, B heel rise (L>R), increased L ankle pain   Single leg calf raise: R moderate quality, excess inversion ; L unable to attempt    Ankle Active Range of Motion:   Ankle Right Left   DF 0 deg 10 deg form 0   Plantarflexion 60 deg 55 deg *   Inversion 40 deg 30 deg   Eversion 30 deg 25 deg *   1st MTP Extension  50 deg 45 deg      Ankle Passive Range of Motion:   Ankle Right Left   DF (knee extended) 10 deg 2 deg   DF (knee bent) 25 deg 8 deg *   Plantarflexion 65 deg 60 deg *   Inversion 45 deg  45 deg   Eversion 35 deg 33 deg   1st MTP Extension  53 deg 50 deg     Lower Extremity Strength   Right  Left    Dorsiflexion 5/5 4+/5* "   Plantarflexion (standing) 4-/5 3+/5   Inversion 5/5 5/5   Eversion 5/5 5/5   Hip extension 3/5 3/5   PGM 3-/5 3-/5       Special Tests:   Right Left   Anterior Drawer Test - -   Varus Stress Test    - -   Valgus Stress Test  - -   External Rotation Stress Test - -   Compression (Squeeze) Test  - -   Fibularis Subluxation Test  - -   Windlass Test - -       Joint Mobility: hypomobile TC and subtalar joint on L      Palpation: TTP at anterior TC joint      Neural provocation:  Slump B (-)      Edema:  Girth Measurement Fig-8   Right 47.5 cm   Left 48.0 cm       CMS Impairment/Limitation/Restriction for FOTO Ankle Survey    Therapist reviewed FOTO scores for Marilu Frausto on 6/21/2022.   FOTO documents entered into [employer] - see Media section.    Not given at eval, will give at 1st follow up         TREATMENT   No Treatment given today.    Home Exercises and Patient Education Provided    Education provided re: diagnosis/prognsis, goals for therapy, role of therapy for care, exercises    Written Home Exercises Provided: none.  Exercises will be reviewed and Marilu will be able to demonstrate them prior to the end of the session.   Pt will receive a written copy of exercises to perform at home. Marilu will demonstrate good  understanding of the education provided.     See EMR under patient instructions for exercises given.   Assessment   Marilu is a 41 y.o. female referred to outpatient Physical Therapy with a medical diagnosis of moderate left ankle sprain, and ankle joint instability. Pt presents with decreased ankle range of motion, gait dysfunction, poor balance, decreased strength in her left ankle, and poor functional movement strategies. Her signs and symptoms are consistent with referring diagnosis and she will benefit from skilled OP PT to address the above deficits and improve her functional independence.     Pt prognosis is Good.   Pt will benefit from skilled outpatient Physical Therapy to  address the deficits stated above and in the chart below, provide pt/family education, and to maximize pt's level of independence.     Plan of care discussed with patient: Yes  Pt's spiritual, cultural and educational needs considered and patient is agreeable to the plan of care and goals as stated below:     Anticipated Barriers for therapy: none    Medical Necessity is demonstrated by the following  History  Co-morbidities and personal factors that may impact the plan of care Co-morbidities:   Epilepsy, migraines    Personal Factors:   no deficits     moderate   Examination  Body Structures and Functions, activity limitations and participation restrictions that may impact the plan of care Body Regions:   lower extremities    Body Systems:    gross symmetry  ROM  strength  gross coordinated movement  balance  gait  transfers  transitions  motor control  motor learning    Participation Restrictions:   Decreased community ambulation    Activity limitations:   Learning and applying knowledge  no deficits    General Tasks and Commands  no deficits    Communication  no deficits    Mobility  lifting and carrying objects  walking  driving (bike, car, motorcycle)    Self care  dressing    Domestic Life  shopping  cooking  doing house work (cleaning house, washing dishes, laundry)    Interactions/Relationships  no deficits    Life Areas  no deficits    Community and Social Life  community life  recreation and leisure         high   Clinical Presentation stable and uncomplicated low   Decision Making/ Complexity Score: low     Goals:  Short Term Goals:  4 weeks  1.Report decreased L ankle pain  < / =  4/10  to increase tolerance for weight bearing  2. Increase ROM by 5 degrees in order to walk with min to no compensation.  3. Increase strength by 1/3 MMT grade for  L LE  to increase tolerance for ADL and work activities.  4. Pt to tolerate HEP to improve ROM and independence with ADL's    Long Term Goals: 8 weeks  1.Report  decreased L ankle pain  < / =  1/10  to increase tolerance for community ambulat  2.Patient goal: to be pain free with swimming with her kids  3.Increase strength to 4+/5 for  L LE  to increase tolerance for ADL and work activities.  4. Pt to tolerate updated HEP to demonstrate independence in self-management of care after discharge.       Plan   Plan of care Certification: 6/21/2022 to 08/02/2022.    Outpatient Physical Therapy 2 times weekly for 6 weeks to include the following interventions: Aquatic Therapy, Gait Training, Manual Therapy, Moist Heat/ Ice, Neuromuscular Re-ed, Patient Education, Therapeutic Activities and Therapeutic Exercise.     Krystle Franco PT, DPT

## 2022-06-29 NOTE — PROGRESS NOTES
[facility]   Physical Therapy Treatment Note     Name: Marilu Frausto  Clinic Number: [id number]    Therapy Diagnosis:   Encounter Diagnoses   Name Primary?    Gait abnormality Yes    Decreased range of motion of left ankle     Decreased strength of lower extremity      Physician: Cory Martin DPM    Visit Date: 6/30/2022    Physician Orders: PT Eval and Treat   Medical Diagnosis from Referral:   S93.402S (ICD-10-CM) - Moderate left ankle sprain, sequela   M25.372 (ICD-10-CM) - Ankle joint instability, left      Evaluation Date: 6/21/2022  Authorization Period Expiration: 12/31/22  Plan of Care Expiration: 08/02/2022  Progress Note Due: 07/19/2022  Visit # / Visits authorized: 2/30  FOTO: 1/3     Precautions: Standard     PTA Visit #: 0/5     Time In: 1601  Time Out: 1656  Total Billable Time: 55 minutes    SUBJECTIVE     Pt reports: her ankle only hurts when she points toes down.   She was not compliant with home exercise program. None given yet.  Response to previous treatment: good  Functional change: none to note    Pain: 0/10  Location: left ankle     OBJECTIVE     Objective Measures updated at progress report unless specified.     Treatment     Marilu received the treatments listed below:      Therapeutic exercises to develop strength, endurance, ROM, flexibility, posture and core stabilization for 53 minutes including:    Nustep x 5 min level 2    Gastroc stretch on incline 3 x 30 sec  Soleus stretch on incline 3 x 30 sec  Stair ankle DF stretch 5 sec hold x 20  Rockerboard x 20 ea way  Standing hip abd on blue pad x 20 ea  Standing march on blue pad x 20 ea  SLS on blue pad 3 x 30 sec  Heel raises x 20  4-way ankle w RTB x 20 ea way  Ankle DF mob w RTB post pull 5 sec hold x 10  Shuttle squats 2.5 bands x 30    Manual therapy techniques: for 2 minutes including:    Grade 3-4 posterior L talocrural joint mob    CMS Impairment/Limitation/Restriction for FOTO ankle  Survey    Therapist reviewed FOTO scores for Marilu Frausto on 6/30/2022.   FOTO documents entered into [employer] - see Media section.    Limitation Score: 42%     Patient Education and Home Exercises     Home Exercises Provided and Patient Education Provided     Education provided:   - diagnosis/prognsis, goals for therapy, role of therapy for care, exercises     Written Home Exercises Provided: yes. Pt given red theraband for home use.  Exercises were reviewed and Marilu was able to demonstrate them prior to the end of the session.  Marilu demonstrated good  understanding of the education provided. See EMR under Patient Instructions for exercises provided during therapy sessions    ASSESSMENT     Marilu had good tolerance to treatment today with no adverse effects. Post-treatment L ankle pain rated as 0/10. Pt with good response to exercise program. She reports increase in anterior ankle pain with ankle PF strengthening at end-range. No exacerbation with other exercises. She continues to be challenged with ankle DF mobility. Improvement in symptoms with posterior talocrural joint mobs. HEP and theraband provided. Slight challenge with single leg strengthening on uneven surface. Will continue to progress LE strength and stability to tolerance.     Marilu Is progressing well towards her goals.   Pt prognosis is Good.     Pt will continue to benefit from skilled outpatient physical therapy to address the deficits listed in the problem list box on initial evaluation, provide pt/family education and to maximize pt's level of independence in the home and community environment.     Pt's spiritual, cultural and educational needs considered and pt agreeable to plan of care and goals.     Anticipated barriers to physical therapy: none    Goals:  Short Term Goals:  4 weeks  1.Report decreased L ankle pain  < / =  4/10  to increase tolerance for weight bearing  2. Increase ROM by 5 degrees in order to walk with min  to no compensation.  3. Increase strength by 1/3 MMT grade for  L LE  to increase tolerance for ADL and work activities.  4. Pt to tolerate HEP to improve ROM and independence with ADL's     Long Term Goals: 8 weeks  1.Report decreased L ankle pain  < / =  1/10  to increase tolerance for community ambulat  2.Patient goal: to be pain free with swimming with her kids  3.Increase strength to 4+/5 for  L LE  to increase tolerance for ADL and work activities.  4. Pt to tolerate updated HEP to demonstrate independence in self-management of care after discharge.     PLAN     Plan of care Certification: 6/21/2022 to 08/02/2022.     Outpatient Physical Therapy 2 times weekly for 6 weeks to include the following interventions: Aquatic Therapy, Gait Training, Manual Therapy, Moist Heat/ Ice, Neuromuscular Re-ed, Patient Education, Therapeutic Activities and Therapeutic Exercise.     Progress ankle stability and mobility.     Suzan Rosario, PT

## 2022-06-30 ENCOUNTER — CLINICAL SUPPORT (OUTPATIENT)
Dept: REHABILITATION | Facility: HOSPITAL | Age: 42
End: 2022-06-30
Payer: COMMERCIAL

## 2022-06-30 DIAGNOSIS — M25.672 DECREASED RANGE OF MOTION OF LEFT ANKLE: ICD-10-CM

## 2022-06-30 DIAGNOSIS — R29.898 DECREASED STRENGTH OF LOWER EXTREMITY: ICD-10-CM

## 2022-06-30 DIAGNOSIS — R26.9 GAIT ABNORMALITY: Primary | ICD-10-CM

## 2022-06-30 PROCEDURE — 97110 THERAPEUTIC EXERCISES: CPT | Mod: PN

## 2022-07-06 ENCOUNTER — CLINICAL SUPPORT (OUTPATIENT)
Dept: REHABILITATION | Facility: HOSPITAL | Age: 42
End: 2022-07-06
Payer: COMMERCIAL

## 2022-07-06 DIAGNOSIS — M25.672 DECREASED RANGE OF MOTION OF LEFT ANKLE: ICD-10-CM

## 2022-07-06 DIAGNOSIS — R26.9 GAIT ABNORMALITY: Primary | ICD-10-CM

## 2022-07-06 DIAGNOSIS — R29.898 DECREASED STRENGTH OF LOWER EXTREMITY: ICD-10-CM

## 2022-07-06 PROCEDURE — 97140 MANUAL THERAPY 1/> REGIONS: CPT | Mod: PN

## 2022-07-06 PROCEDURE — 97112 NEUROMUSCULAR REEDUCATION: CPT | Mod: PN

## 2022-07-06 PROCEDURE — 97110 THERAPEUTIC EXERCISES: CPT | Mod: PN

## 2022-07-06 NOTE — PROGRESS NOTES
[facility]   Physical Therapy Treatment Note     Name: Marilu Frausto  Clinic Number: [id number]    Therapy Diagnosis:   Encounter Diagnoses   Name Primary?    Gait abnormality Yes    Decreased range of motion of left ankle     Decreased strength of lower extremity      Physician: Cory Martin DPM    Visit Date: 7/6/2022    Physician Orders: PT Eval and Treat   Medical Diagnosis from Referral:   S93.402S (ICD-10-CM) - Moderate left ankle sprain, sequela   M25.372 (ICD-10-CM) - Ankle joint instability, left      Evaluation Date: 6/21/2022  Authorization Period Expiration: 12/31/22  Plan of Care Expiration: 08/02/2022  Progress Note Due: 07/19/2022  Visit # / Visits authorized: 3/30  FOTO: 3/5     Precautions: Standard     PTA Visit #: 0/5     Time In: 3:17PM  Time Out: 4:00PM  Total Billable Time: 43 minutes (2TE, 1NMR, 1MT)    SUBJECTIVE     Patient reports: Continued absent pain at rest. She has been completing exercise given by PT last session without issue.  She was compliant with home exercise program.  Response to previous treatment: No adverse response; minimal soreness  Functional change: HEP compliance    Pain: 0/10  Location: left ankle     OBJECTIVE     Objective Measures updated at progress report unless specified.     Treatment     Marilu received the treatments listed below:      Therapeutic exercises to develop strength, endurance, ROM, flexibility, posture and core stabilization for 24 minutes including:    Gastroc stretch on incline 3 x 30 sec  Soleus stretch on incline 2 x 30 sec  Heel raises solid ground 2x15  Stair ankle DF stretch 5 sec hold x 20  Standing hip abd on green foam disc 2x10B  Leg press bilateral lower extremity squat 3x15; 4 plates  Leg press bilateral lower extremity heel raise on edge for full range 2x10; 2 plates     NOT TODAY - out of time  Nustep x 5 min level 2  Rockerboard x 20 ea way  4-way ankle w RTB x 20 ea way  Ankle DF mob w  "RTB post pull 5 sec hold x 10    Neuromuscular re-education to improve proprioception and balance for 10 minutes including:    Tandem stance on airex 2x20"B  Standing reciprocal march on airex 2x20  SLS on blue foam disc 3x20" left lower extremity  Tandem walking on airex beam x 6 lengths x 8 feet each at / bar    Manual therapy techniques for 9 minutes to improve range of motion/flexibility including:    Grade 3-4 posterior L talocrural joint mobilization  Prone manual L soleus stretch    Patient Education and Home Exercises     Home Exercises Provided and Patient Education Provided     Education provided:   - diagnosis/prognsis, goals for therapy, role of therapy for care, exercises     Written Home Exercises Provided: yes. Pt given red theraband for home use.  Exercises were reviewed and Marilu was able to demonstrate them prior to the end of the session.  Marilu demonstrated good  understanding of the education provided. See EMR under Patient Instructions for exercises provided during therapy sessions    ASSESSMENT     Marilu tolerated session without adverse response. Additional foam-based activities implemented today to address ankle proprioception; fair ankle strategy noted. Per observation, ankle dorsiflexion still limiting functional mobility tasks. She would benefit from continued PT services to achieve goals established at evaluation.    Marilu Is progressing well towards her goals.   Pt prognosis is Good.     Pt will continue to benefit from skilled outpatient physical therapy to address the deficits listed in the problem list box on initial evaluation, provide pt/family education and to maximize pt's level of independence in the home and community environment.     Pt's spiritual, cultural and educational needs considered and pt agreeable to plan of care and goals.     Anticipated barriers to physical therapy: none    Goals:  Short Term Goals:  4 weeks  1.Report decreased L ankle pain  < / =  " 4/10  to increase tolerance for weight bearing  2. Increase ROM by 5 degrees in order to walk with min to no compensation.  3. Increase strength by 1/3 MMT grade for  L LE  to increase tolerance for ADL and work activities.  4. Pt to tolerate HEP to improve ROM and independence with ADL's     Long Term Goals: 8 weeks  1.Report decreased L ankle pain  < / =  1/10  to increase tolerance for community ambulat  2.Patient goal: to be pain free with swimming with her kids  3.Increase strength to 4+/5 for  L LE  to increase tolerance for ADL and work activities.  4. Pt to tolerate updated HEP to demonstrate independence in self-management of care after discharge.     PLAN     Plan of care Certification: 6/21/2022 to 08/02/2022.     Outpatient Physical Therapy 2 times weekly for 6 weeks to include the following interventions: Aquatic Therapy, Gait Training, Manual Therapy, Moist Heat/ Ice, Neuromuscular Re-ed, Patient Education, Therapeutic Activities and Therapeutic Exercise.     Progress ankle stability and mobility.     MAGGIE PATTERSON, PT

## 2022-07-08 ENCOUNTER — CLINICAL SUPPORT (OUTPATIENT)
Dept: REHABILITATION | Facility: HOSPITAL | Age: 42
End: 2022-07-08
Payer: COMMERCIAL

## 2022-07-08 DIAGNOSIS — R26.9 GAIT ABNORMALITY: Primary | ICD-10-CM

## 2022-07-08 DIAGNOSIS — R29.898 DECREASED STRENGTH OF LOWER EXTREMITY: ICD-10-CM

## 2022-07-08 DIAGNOSIS — M25.672 DECREASED RANGE OF MOTION OF LEFT ANKLE: ICD-10-CM

## 2022-07-08 PROCEDURE — 97112 NEUROMUSCULAR REEDUCATION: CPT | Mod: PN,CQ

## 2022-07-08 PROCEDURE — 97110 THERAPEUTIC EXERCISES: CPT | Mod: PN,CQ

## 2022-07-08 PROCEDURE — 97140 MANUAL THERAPY 1/> REGIONS: CPT | Mod: PN,CQ

## 2022-07-08 NOTE — PROGRESS NOTES
[facility]   Physical Therapy Treatment Note     Name: Marilu Frausto  Clinic Number: [id number]    Therapy Diagnosis:   Encounter Diagnoses   Name Primary?    Gait abnormality Yes    Decreased range of motion of left ankle     Decreased strength of lower extremity      Physician: Cory Martin DPM    Visit Date: 7/8/2022    Physician Orders: PT Eval and Treat   Medical Diagnosis from Referral:   S93.402S (ICD-10-CM) - Moderate left ankle sprain, sequela   M25.372 (ICD-10-CM) - Ankle joint instability, left      Evaluation Date: 6/21/2022  Authorization Period Expiration: 12/31/22  Plan of Care Expiration: 08/02/2022  Progress Note Due: 07/19/2022  Visit # / Visits authorized: 4/30  FOTO: 4/5     Precautions: Standard     PTA Visit #: 1/5     Time In: 11:00AM  Time Out: 11:48PM  Total Billable Time: 48 minutes (1TE, 1NMR, 1MT)    SUBJECTIVE     Patient reports: had increased pain the night of her last session that went away with a tylenol, would like to be able to run again if needed with having kids  She was compliant with home exercise program.  Response to previous treatment: No adverse response; minimal soreness  Functional change: HEP compliance    Pain: 0/10  Location: left ankle     OBJECTIVE     Objective Measures updated at progress report unless specified.     Treatment     Marilu received the treatments listed below:      Therapeutic exercises to develop strength, endurance, ROM, flexibility, posture and core stabilization for 30 minutes including:    Gastroc stretch on incline 3 x 30 sec  Soleus stretch on incline 2 x 30 sec  Heel raises solid ground 2x15  Stair ankle DF stretch 5 sec hold x 20  Standing hip abd on green foam disc 2x10B  Leg press bilateral lower extremity squat 3x15; 4 plates  Leg press bilateral lower extremity heel raise on edge for full range 2x10; 2 plates     NOT TODAY - out of time  Nustep x 5 min level 2  Rockerboard x 20 ea way  4-way  "ankle w RTB x 20 ea way  Ankle DF mob w RTB post pull 5 sec hold x 10    Neuromuscular re-education to improve proprioception and balance for 10 minutes including:    Tandem stance on airex 2x20"B  Standing reciprocal march on airex 2x20  SLS on blue foam disc 3x20" left lower extremity  Tandem walking on airex beam x 6 lengths x 8 feet each at / bar    Manual therapy techniques for 8 minutes to improve range of motion/flexibility including:    Grade 3-4 posterior L talocrural joint mobilization  Prone manual L soleus stretch    Patient Education and Home Exercises     Home Exercises Provided and Patient Education Provided     Education provided:   - diagnosis/prognsis, goals for therapy, role of therapy for care, exercises     Written Home Exercises Provided: yes. Pt given red theraband for home use.  Exercises were reviewed and Marilu was able to demonstrate them prior to the end of the session.  Marilu demonstrated good  understanding of the education provided. See EMR under Patient Instructions for exercises provided during therapy sessions    ASSESSMENT     Marilu tolerated session well, reports slight pain with step ankle DF stretch which decreased with modification. She presents with fair static balance with balance training with cues for slow controlled movements. Improving mobility noted at the ankle post manual treatment. She would benefit from continued PT services to achieve goals established at evaluation.    Marilu Is progressing well towards her goals.   Pt prognosis is Good.     Pt will continue to benefit from skilled outpatient physical therapy to address the deficits listed in the problem list box on initial evaluation, provide pt/family education and to maximize pt's level of independence in the home and community environment.     Pt's spiritual, cultural and educational needs considered and pt agreeable to plan of care and goals.     Anticipated barriers to physical therapy: " none    Goals:  Short Term Goals:  4 weeks  1.Report decreased L ankle pain  < / =  4/10  to increase tolerance for weight bearing  2. Increase ROM by 5 degrees in order to walk with min to no compensation.  3. Increase strength by 1/3 MMT grade for  L LE  to increase tolerance for ADL and work activities.  4. Pt to tolerate HEP to improve ROM and independence with ADL's     Long Term Goals: 8 weeks  1.Report decreased L ankle pain  < / =  1/10  to increase tolerance for community ambulat  2.Patient goal: to be pain free with swimming with her kids  3.Increase strength to 4+/5 for  L LE  to increase tolerance for ADL and work activities.  4. Pt to tolerate updated HEP to demonstrate independence in self-management of care after discharge.     PLAN     Plan of care Certification: 6/21/2022 to 08/02/2022.     Outpatient Physical Therapy 2 times weekly for 6 weeks to include the following interventions: Aquatic Therapy, Gait Training, Manual Therapy, Moist Heat/ Ice, Neuromuscular Re-ed, Patient Education, Therapeutic Activities and Therapeutic Exercise.     Progress ankle stability and mobility.     KAT EDWARDS, PTA

## 2022-07-11 ENCOUNTER — CLINICAL SUPPORT (OUTPATIENT)
Dept: REHABILITATION | Facility: HOSPITAL | Age: 42
End: 2022-07-11
Payer: COMMERCIAL

## 2022-07-11 DIAGNOSIS — M25.672 DECREASED RANGE OF MOTION OF LEFT ANKLE: ICD-10-CM

## 2022-07-11 DIAGNOSIS — R29.898 DECREASED STRENGTH OF LOWER EXTREMITY: ICD-10-CM

## 2022-07-11 DIAGNOSIS — R26.9 GAIT ABNORMALITY: Primary | ICD-10-CM

## 2022-07-11 PROCEDURE — 97112 NEUROMUSCULAR REEDUCATION: CPT | Mod: PN

## 2022-07-11 PROCEDURE — 97110 THERAPEUTIC EXERCISES: CPT | Mod: PN

## 2022-07-11 NOTE — PROGRESS NOTES
"[facility]PAT[facility] THERAPY AND WELL[facility]   Physical Therapy Treatment Note     Name: Marilu Frausto  Clinic Number: [id number]    Therapy Diagnosis:   No diagnosis found.  Physician: Cory Martin DPM    Visit Date: 7/11/2022    Physician Orders: PT Eval and Treat   Medical Diagnosis from Referral:   S93.402S (ICD-10-CM) - Moderate left ankle sprain, sequela   M25.372 (ICD-10-CM) - Ankle joint instability, left      Evaluation Date: 6/21/2022  Authorization Period Expiration: 12/31/22  Plan of Care Expiration: 08/02/2022  Progress Note Due: 07/19/2022  Visit # / Visits authorized: 5/30  FOTO: 5/5 NEXT     Precautions: Standard     PTA Visit #: 0/5     Time In: 0300 pm  Time Out: 0400 pm  Total Treatment Time: 60 minutes  Total Billable Time: 60 minutes (1 NMR, 3 TE)    SUBJECTIVE     Patient reports: had increased pain the night of her last session that went away with a tylenol, would like to be able to run again if needed with having kids  She was compliant with home exercise program.  Response to previous treatment: No adverse response; minimal soreness  Functional change: HEP compliance    Pain: 0/10  Location: left ankle     OBJECTIVE     Objective Measures updated at progress report unless specified.     Treatment     Marilu received the treatments listed below:      Therapeutic exercises to develop strength, endurance, ROM, flexibility, posture and core stabilization for 50 minutes including:  - Recumbent bike 7 min; Lv 4.0 for increased tissue extenbisibility  - Gastroc stretch on incline 3 x 30 sec  - Soleus stretch on incline 3 x 30 sec  - Ankle DF mob w Yellow powerband post pull 5 sec hold; 3 min    - DL Heel raises solid ground 2 x 20  - Seated heel raise: 35#; 3 x 15 5" hold  - Leg press bilateral lower extremity squat 4 x 10; 8 plates, GTB at knees-- 1 min rest between sets    - SL clamshells; 3 x 15 w/ 5" hold    Not Today:  - Standing hip abd on green foam disc 2x10B  - Leg press bilateral " "lower extremity heel raise on edge for full range 2x10; 3 plates     Neuromuscular re-education to improve proprioception and balance for 10 minutes including:  Tandem walking on airex beam x 8 lengths x 8 feet each at / bar    Not today  Tandem stance on airex 2x20"B  Standing reciprocal march on airex 2x20  SLS on blue foam disc 3x20" left lower extremity      Manual therapy techniques for 00 minutes to improve range of motion/flexibility including:    Grade 3-4 posterior L talocrural joint mobilization  Prone manual L soleus stretch    Patient Education and Home Exercises     Home Exercises Provided and Patient Education Provided     Education provided:   - diagnosis/prognsis, goals for therapy, role of therapy for care, exercises     Written Home Exercises Provided: yes. Pt given red theraband for home use.  Exercises were reviewed and Marilu was able to demonstrate them prior to the end of the session.  Marilu demonstrated good  understanding of the education provided. See EMR under Patient Instructions for exercises provided during therapy sessions    ASSESSMENT     Marilu is improving well in strength and stability of her ankle as well as her L hip. She was appropriately challenged by hip strengthening as well as leg press.She showed good improvements in ankle DF after self-mobilization with power band, will consider adding this HEP if lack in CKC DF continues. Will take updated measures at progress note to determine remaining ankle deficits to address.    Marilu Is progressing well towards her goals.   Pt prognosis is Good.     Pt will continue to benefit from skilled outpatient physical therapy to address the deficits listed in the problem list box on initial evaluation, provide pt/family education and to maximize pt's level of independence in the home and community environment.     Pt's spiritual, cultural and educational needs considered and pt agreeable to plan of care and " goals.    Anticipated barriers to physical therapy: none    Goals:  Short Term Goals:  4 weeks  1.Report decreased L ankle pain  < / =  4/10  to increase tolerance for weight bearing  2. Increase ROM by 5 degrees in order to walk with min to no compensation.  3. Increase strength by 1/3 MMT grade for  L LE  to increase tolerance for ADL and work activities.  4. Pt to tolerate HEP to improve ROM and independence with ADL's     Long Term Goals: 8 weeks  1.Report decreased L ankle pain  < / =  1/10  to increase tolerance for community ambulat  2.Patient goal: to be pain free with swimming with her kids  3.Increase strength to 4+/5 for  L LE  to increase tolerance for ADL and work activities.  4. Pt to tolerate updated HEP to demonstrate independence in self-management of care after discharge.     PLAN     Plan of care Certification: 6/21/2022 to 08/02/2022.     Outpatient Physical Therapy 2 times weekly for 6 weeks to include the following interventions: Aquatic Therapy, Gait Training, Manual Therapy, Moist Heat/ Ice, Neuromuscular Re-ed, Patient Education, Therapeutic Activities and Therapeutic Exercise.     Progress ankle stability and mobility.     Krystle Franco, PT, DPT

## 2022-07-14 ENCOUNTER — CLINICAL SUPPORT (OUTPATIENT)
Dept: REHABILITATION | Facility: HOSPITAL | Age: 42
End: 2022-07-14
Payer: COMMERCIAL

## 2022-07-14 DIAGNOSIS — M25.672 DECREASED RANGE OF MOTION OF LEFT ANKLE: ICD-10-CM

## 2022-07-14 DIAGNOSIS — R26.9 GAIT ABNORMALITY: Primary | ICD-10-CM

## 2022-07-14 DIAGNOSIS — R29.898 DECREASED STRENGTH OF LOWER EXTREMITY: ICD-10-CM

## 2022-07-14 PROCEDURE — 97110 THERAPEUTIC EXERCISES: CPT | Mod: PN,CQ

## 2022-07-14 PROCEDURE — 97112 NEUROMUSCULAR REEDUCATION: CPT | Mod: PN,CQ

## 2022-07-14 NOTE — PROGRESS NOTES
"[facility]   Physical Therapy Treatment Note     Name: Marilu Frausto  Clinic Number: [id number]    Therapy Diagnosis:   Encounter Diagnoses   Name Primary?    Gait abnormality Yes    Decreased range of motion of left ankle     Decreased strength of lower extremity      Physician: Cory Martin DPM    Visit Date: 7/14/2022    Physician Orders: PT Eval and Treat   Medical Diagnosis from Referral:   S93.402S (ICD-10-CM) - Moderate left ankle sprain, sequela   M25.372 (ICD-10-CM) - Ankle joint instability, left      Evaluation Date: 6/21/2022  Authorization Period Expiration: 12/31/22  Plan of Care Expiration: 08/02/2022  Progress Note Due: 07/19/2022  Visit # / Visits authorized: 5/30  FOTO: 5/5 NEXT     Precautions: Standard     PTA Visit #: 1/5     Time In: 1200  Time Out: 1255  Total Treatment Time: 60 minutes  Total Billable Time: 55 minutes (1 NMR, 3 TE)    SUBJECTIVE     Patient reports: she is doing well. Not having pain in particular. Anything pointing the toes down bothers her  She was compliant with home exercise program.  Response to previous treatment: No adverse response; minimal soreness  Functional change: HEP compliance    Pain: 0/10  Location: left ankle     OBJECTIVE     Objective Measures updated at progress report unless specified.     Treatment     Marilu received the treatments listed below:      Therapeutic exercises to develop strength, endurance, ROM, flexibility, posture and core stabilization for 50 minutes including:  - Recumbent bike 7 min; Lv 4.0 for increased tissue extenbisibility  - Gastroc stretch on incline 3 x 30 sec  - Soleus stretch on incline 3 x 30 sec  - Ankle DF mob w red powerband post pull 5 sec hold; 3 min  - side stepping GTB @ knees x2 laps   - Monster walks GTB @ knees x2 laps   - DL Heel raises solid ground 2 x 20  - Seated heel raise: 35#; 3 x 15 5" hold  - Leg press bilateral lower extremity squat 4 x 10; 8 plates, GTB at " "knees-- 1 min rest between sets    - SL clamshells; 3 x 15 w/ 5" hold-np    Not Today:  - Standing hip abd on green foam disc 2x10B  - Leg press bilateral lower extremity heel raise on edge for full range 2x10; 3 plates     Neuromuscular re-education to improve proprioception and balance for 10 minutes including:  Tandem walking on airex beam x 8 lengths x 8 feet each at / bar    6'' step up to SLS +airex x20  Tandem stance on airex 2x20"B  Standing reciprocal march on airex 2x20-np  SLS on blue foam disc 3x20" left lower extremity      Manual therapy techniques for 00 minutes to improve range of motion/flexibility including:    Grade 3-4 posterior L talocrural joint mobilization  Prone manual L soleus stretch    Patient Education and Home Exercises     Home Exercises Provided and Patient Education Provided     Education provided:   - diagnosis/prognsis, goals for therapy, role of therapy for care, exercises     Written Home Exercises Provided: yes. Pt given red theraband for home use.  Exercises were reviewed and Marilu was able to demonstrate them prior to the end of the session.  Marilu demonstrated good  understanding of the education provided. See EMR under Patient Instructions for exercises provided during therapy sessions    ASSESSMENT     Marilu is improving well in strength and stability of her ankle as well as her L hip. Pt challenged with eyes closed balance today in tandem stance. Pt showed good ankle strategy with no pain on step ups to airex. Added dynamic hip strengthening and functional strengthening with sit to stand.    Marilu Is progressing well towards her goals.   Pt prognosis is Good.     Pt will continue to benefit from skilled outpatient physical therapy to address the deficits listed in the problem list box on initial evaluation, provide pt/family education and to maximize pt's level of independence in the home and community environment.     Pt's spiritual, cultural and " educational needs considered and pt agreeable to plan of care and goals.    Anticipated barriers to physical therapy: none    Goals:  Short Term Goals:  4 weeks  1.Report decreased L ankle pain  < / =  4/10  to increase tolerance for weight bearing  2. Increase ROM by 5 degrees in order to walk with min to no compensation.  3. Increase strength by 1/3 MMT grade for  L LE  to increase tolerance for ADL and work activities.  4. Pt to tolerate HEP to improve ROM and independence with ADL's     Long Term Goals: 8 weeks  1.Report decreased L ankle pain  < / =  1/10  to increase tolerance for community ambulat  2.Patient goal: to be pain free with swimming with her kids  3.Increase strength to 4+/5 for  L LE  to increase tolerance for ADL and work activities.  4. Pt to tolerate updated HEP to demonstrate independence in self-management of care after discharge.     PLAN     Plan of care Certification: 6/21/2022 to 08/02/2022.     Outpatient Physical Therapy 2 times weekly for 6 weeks to include the following interventions: Aquatic Therapy, Gait Training, Manual Therapy, Moist Heat/ Ice, Neuromuscular Re-ed, Patient Education, Therapeutic Activities and Therapeutic Exercise.     Progress ankle stability and mobility.     Tanner Gavin, PTA

## 2022-07-19 ENCOUNTER — CLINICAL SUPPORT (OUTPATIENT)
Dept: REHABILITATION | Facility: HOSPITAL | Age: 42
End: 2022-07-19
Payer: COMMERCIAL

## 2022-07-19 DIAGNOSIS — R26.9 GAIT ABNORMALITY: Primary | ICD-10-CM

## 2022-07-19 DIAGNOSIS — R29.898 DECREASED STRENGTH OF LOWER EXTREMITY: ICD-10-CM

## 2022-07-19 DIAGNOSIS — M25.672 DECREASED RANGE OF MOTION OF LEFT ANKLE: ICD-10-CM

## 2022-07-19 PROCEDURE — 97112 NEUROMUSCULAR REEDUCATION: CPT | Mod: PN,CQ

## 2022-07-19 PROCEDURE — 97110 THERAPEUTIC EXERCISES: CPT | Mod: PN,CQ

## 2022-07-19 NOTE — PROGRESS NOTES
"[facility]   Physical Therapy Treatment Note     Name: Marilu Frausto  Clinic Number: [id number]    Therapy Diagnosis:   Encounter Diagnoses   Name Primary?    Gait abnormality Yes    Decreased range of motion of left ankle     Decreased strength of lower extremity      Physician: Cory Martin DPM    Visit Date: 7/19/2022    Physician Orders: PT Eval and Treat   Medical Diagnosis from Referral:   S93.402S (ICD-10-CM) - Moderate left ankle sprain, sequela   M25.372 (ICD-10-CM) - Ankle joint instability, left      Evaluation Date: 6/21/2022  Authorization Period Expiration: 12/31/22  Plan of Care Expiration: 08/02/2022  Progress Note Due: 07/19/2022  Visit # / Visits authorized: 6/30  FOTO: 6/5 #2 completed 7/19/2022     Precautions: Standard     PTA Visit #: 2/5     Time In: 3:00 pm  Time Out: 4:00 pm  Total Treatment Time: 60 minutes  Total Billable Time: 57 minutes (1 NMR, 3 TE)    SUBJECTIVE     Patient reports: no complaints of pain. States continued complaint of pain with getting up off floor.  She was compliant with home exercise program.  Response to previous treatment: No adverse response; minimal soreness  Functional change: HEP compliance    Pain: 0/10  Location: left ankle     OBJECTIVE     Objective Measures updated at progress report unless specified.     Treatment     Marilu received the treatments listed below:      Therapeutic exercises to develop strength, endurance, ROM, flexibility, posture and core stabilization for 45 minutes including:  - Recumbent bike 7 min; Lv 4.0 for increased tissue extensibility  - Gastroc stretch on incline 3 x 30 sec  - Soleus stretch on incline 3 x 30 sec  - Ankle DF mob w red powerband post pull 5 sec hold; 3 min  - side stepping GTB @ knees x2 laps (1 lap = 2x25 ft)  - Monster walks GTB @ knees x2 laps (1 lap = 2x25 feet)  - DL Heel raises solid ground 2 x 20  - Seated heel raise: 35#; 3 x 15 5" hold  - Leg press bilateral " "lower extremity squat 4 x 10; 8 plates, GTB at knees-- 1 min rest between sets    - SL clamshells; 3 x 15 w/ 5" hold-np    Not Today:  - Standing hip abd on green foam disc 2x10B  - Leg press bilateral lower extremity heel raise on edge for full range 2x10; 3 plates     Neuromuscular re-education to improve proprioception and balance for 12 minutes including:  Tandem walking on airex beam x 4 laps of 2 x 8 feet each at / bar No upper extremity support  6'' step up to SLS +airex x 20  Tandem stance on airex 2x30"B trial eyes closed next visit.  Standing reciprocal march on airex 2x20  SLS on blue foam disc 3x30" left lower extremity      Manual therapy techniques for 00 minutes to improve range of motion/flexibility including:    Grade 3-4 posterior L talocrural joint mobilization  Prone manual L soleus stretch    Patient Education and Home Exercises     Home Exercises Provided and Patient Education Provided     Education provided:   - diagnosis/prognsis, goals for therapy, role of therapy for care, exercises     Written Home Exercises Provided: yes. Pt given red theraband for home use.  Exercises were reviewed and Marilu was able to demonstrate them prior to the end of the session.  Marilu demonstrated good  understanding of the education provided. See EMR under Patient Instructions for exercises provided during therapy sessions    ASSESSMENT     Marilu is improving well in strength and stability of her ankle as well as her L hip. Pt showed good ankle strategy with no pain on step ups to airex, able to increase hold time with tandem and SLS balance challenges.  Completed all therex without complaint of increased pain after treatment.  States "good - no pain" at end of session.    Marilu Is progressing well towards her goals.   Pt prognosis is Good.     Pt will continue to benefit from skilled outpatient physical therapy to address the deficits listed in the problem list box on initial evaluation, provide " pt/family education and to maximize pt's level of independence in the home and community environment.     Pt's spiritual, cultural and educational needs considered and pt agreeable to plan of care and goals.    Anticipated barriers to physical therapy: none    Goals:  Short Term Goals:  4 weeks  1.Report decreased L ankle pain  < / =  4/10  to increase tolerance for weight bearing  2. Increase ROM by 5 degrees in order to walk with min to no compensation.  3. Increase strength by 1/3 MMT grade for  L LE  to increase tolerance for ADL and work activities.  4. Pt to tolerate HEP to improve ROM and independence with ADL's     Long Term Goals: 8 weeks  1.Report decreased L ankle pain  < / =  1/10  to increase tolerance for community ambulat  2.Patient goal: to be pain free with swimming with her kids  3.Increase strength to 4+/5 for  L LE  to increase tolerance for ADL and work activities.  4. Pt to tolerate updated HEP to demonstrate independence in self-management of care after discharge.     PLAN     Plan of care Certification: 6/21/2022 to 08/02/2022.     Progress ankle stability and mobility.     Sonali Alvarado, PTA

## 2022-07-21 ENCOUNTER — HOSPITAL ENCOUNTER (EMERGENCY)
Facility: HOSPITAL | Age: 42
Discharge: HOME | End: 2022-07-21
Attending: EMERGENCY MEDICINE
Payer: COMMERCIAL

## 2022-07-21 VITALS
HEIGHT: 61 IN | RESPIRATION RATE: 15 BRPM | TEMPERATURE: 99 F | WEIGHT: 145 LBS | OXYGEN SATURATION: 100 % | BODY MASS INDEX: 27.38 KG/M2 | HEART RATE: 80 BPM | DIASTOLIC BLOOD PRESSURE: 88 MMHG | SYSTOLIC BLOOD PRESSURE: 138 MMHG

## 2022-07-21 DIAGNOSIS — R51.9 ACUTE NONINTRACTABLE HEADACHE, UNSPECIFIED HEADACHE TYPE: Primary | ICD-10-CM

## 2022-07-21 PROCEDURE — 96361 HYDRATE IV INFUSION ADD-ON: CPT | Mod: 59

## 2022-07-21 PROCEDURE — 63600175 PHARM REV CODE 636 W HCPCS: Performed by: PHYSICIAN ASSISTANT

## 2022-07-21 PROCEDURE — 25000003 PHARM REV CODE 250: Performed by: PHYSICIAN ASSISTANT

## 2022-07-21 PROCEDURE — 99284 EMERGENCY DEPT VISIT MOD MDM: CPT | Mod: 25

## 2022-07-21 PROCEDURE — 96375 TX/PRO/DX INJ NEW DRUG ADDON: CPT | Mod: 59

## 2022-07-21 PROCEDURE — 96374 THER/PROPH/DIAG INJ IV PUSH: CPT

## 2022-07-21 RX ORDER — KETOROLAC TROMETHAMINE 10 MG/1
10 TABLET, FILM COATED ORAL EVERY 6 HOURS
Qty: 20 TABLET | Refills: 0 | Status: SHIPPED | OUTPATIENT
Start: 2022-07-21 | End: 2022-07-26

## 2022-07-21 RX ORDER — ONDANSETRON 4 MG/1
4 TABLET, ORALLY DISINTEGRATING ORAL EVERY 6 HOURS PRN
Qty: 20 TABLET | Refills: 0 | Status: SHIPPED | OUTPATIENT
Start: 2022-07-21 | End: 2023-06-04

## 2022-07-21 RX ORDER — ONDANSETRON 2 MG/ML
4 INJECTION INTRAMUSCULAR; INTRAVENOUS
Status: COMPLETED | OUTPATIENT
Start: 2022-07-21 | End: 2022-07-21

## 2022-07-21 RX ORDER — DIPHENHYDRAMINE HYDROCHLORIDE 50 MG/ML
25 INJECTION INTRAMUSCULAR; INTRAVENOUS
Status: COMPLETED | OUTPATIENT
Start: 2022-07-21 | End: 2022-07-21

## 2022-07-21 RX ORDER — KETOROLAC TROMETHAMINE 30 MG/ML
15 INJECTION, SOLUTION INTRAMUSCULAR; INTRAVENOUS
Status: COMPLETED | OUTPATIENT
Start: 2022-07-21 | End: 2022-07-21

## 2022-07-21 RX ADMIN — DIPHENHYDRAMINE HYDROCHLORIDE 25 MG: 50 INJECTION, SOLUTION INTRAMUSCULAR; INTRAVENOUS at 02:07

## 2022-07-21 RX ADMIN — SODIUM CHLORIDE 1000 ML: 0.9 INJECTION, SOLUTION INTRAVENOUS at 02:07

## 2022-07-21 RX ADMIN — KETOROLAC TROMETHAMINE 15 MG: 30 INJECTION, SOLUTION INTRAMUSCULAR; INTRAVENOUS at 02:07

## 2022-07-21 RX ADMIN — ONDANSETRON HYDROCHLORIDE 4 MG: 2 INJECTION, SOLUTION INTRAMUSCULAR; INTRAVENOUS at 02:07

## 2022-07-21 NOTE — FIRST PROVIDER EVALUATION
Emergency Department TeleTriage Encounter Note      CHIEF COMPLAINT    Chief Complaint   Patient presents with    Headache     Migraine reported since last night with nausea, vomiting, photophobia, and noise bothering her.  Attempted to take prescribed medications for the same without relief.         VITAL SIGNS   Initial Vitals [07/21/22 1132]   BP Pulse Resp Temp SpO2   127/83 80 16 98.2 °F (36.8 °C) 100 %      MAP       --            ALLERGIES    Review of patient's allergies indicates:  No Known Allergies    PROVIDER TRIAGE NOTE  This is a teletriage evaluation of a 41 y.o. female presenting to the ED complaining of migraine. Patient reports migraine that started last night. She has associated nausea, vomiting, and photophobia. She has history of migraines. This time is different in that it escalated much faster than previous ones.    Initial orders will be placed and care will be transferred to an alternate provider when patient is roomed for a full evaluation. Any additional orders and the final disposition will be determined by that provider.           ORDERS  Labs Reviewed - No data to display    ED Orders (720h ago, onward)    None            Virtual Visit Note: The provider triage portion of this emergency department evaluation and documentation was performed via [employer], a HIPAA-compliant telemedicine application, in concert with a tele-presenter in the room. A face to face patient evaluation with one of my colleagues will occur once the patient is placed in an emergency department room.      DISCLAIMER: This note was prepared with [employer] voice recognition transcription software. Garbled syntax, mangled pronouns, and other bizarre constructions may be attributed to that software system.

## 2022-07-21 NOTE — ED PROVIDER NOTES
Encounter Date: 2022       History     Chief Complaint   Patient presents with    Headache     Migraine reported since last night with nausea, vomiting, photophobia, and noise bothering her.  Attempted to take prescribed medications for the same without relief.       41-year-old female, PMH epilepsy, migraines, presents to ED with concern of migraine that began yesterday evening.  Patient reports symptoms are similar to previous migraines, but are not alleviated with her home Fioricet.  Headache described as pressure, constant, frontal, nonradiating, severity 8/10.  She reports headache was not immediate onset but did gradually worsened at a fast rate.  She has had associated nausea, vomiting and photophobia.  No lightheadedness, dizziness, visual changes, numbness, focal weakness, chest pain, cough, shortness of breath, abdominal pain, urinary or bowel complaints.  Denies fevers, chills, neck pain, recent sicknesses.  No other acute complaints at this time.    The history is provided by the patient.     Review of patient's allergies indicates:  No Known Allergies  Past Medical History:   Diagnosis Date    Epilepsy     Migraines      Past Surgical History:   Procedure Laterality Date     SECTION       No family history on file.  Social History     Tobacco Use    Smoking status: Former Smoker     Types: Cigarettes     Quit date: 2021     Years since quittin.6    Smokeless tobacco: Never Used   Substance Use Topics    Alcohol use: Not Currently     Review of Systems   Constitutional: Negative for chills, fatigue and fever.   HENT: Negative for congestion and sore throat.    Eyes: Positive for photophobia. Negative for pain and visual disturbance.   Respiratory: Negative for cough and shortness of breath.    Cardiovascular: Negative for chest pain.   Gastrointestinal: Positive for nausea and vomiting. Negative for abdominal pain and diarrhea.   Musculoskeletal: Negative for neck pain and  neck stiffness.   Neurological: Positive for headaches. Negative for dizziness, seizures, weakness, light-headedness and numbness.       Physical Exam     Initial Vitals [07/21/22 1132]   BP Pulse Resp Temp SpO2   127/83 80 16 98.2 °F (36.8 °C) 100 %      MAP       --         Physical Exam    Nursing note and vitals reviewed.  Constitutional: Vital signs are normal. She appears well-developed and well-nourished. She is cooperative. She does not have a sickly appearance. She does not appear ill. No distress.   Resting on chair, sunglasses on, speaking in normal volume   HENT:   Head: Normocephalic and atraumatic.   Eyes: Conjunctivae and EOM are normal. Pupils are equal, round, and reactive to light.   Neck:   No nuchal rigidity   Normal range of motion.  Cardiovascular: Normal rate, regular rhythm and normal heart sounds.   Pulmonary/Chest: Effort normal and breath sounds normal.   Musculoskeletal:      Cervical back: Normal range of motion.     Neurological: She is alert. GCS eye subscore is 4. GCS verbal subscore is 5. GCS motor subscore is 6.   CN 2-12 grossly intact.  Stable gait   Psychiatric: She has a normal mood and affect. Her speech is normal and behavior is normal.         ED Course   Procedures  Labs Reviewed   POCT URINE PREGNANCY          Imaging Results    None          Medications   ketorolac injection 15 mg (15 mg Intravenous Given 7/21/22 1441)   diphenhydrAMINE injection 25 mg (25 mg Intravenous Given 7/21/22 1440)   sodium chloride 0.9% bolus 1,000 mL (0 mLs Intravenous Stopped 7/21/22 1529)   ondansetron injection 4 mg (4 mg Intravenous Given 7/21/22 1441)     Medical Decision Making:   Initial Assessment:   Patient presents with concern of migraine, reporting significant history of migraines.  She does report current headache is similar to previous migraines but not alleviated with her home Fioricet.  No fevers, chills, neck pain.  She does have associated nausea, vomiting and photophobia.   Afebrile on arrival with vitals WNL.  Differential Diagnosis:   Migraine, tension, cluster  ED Management:  Will proceed with IV fluids, Zofran, Benadryl and Toradol in attempt to improved patient's migraine symptoms.    4:32 PM  Patient was reassessed, stating she is having significant improvement of her symptoms and eager to return home.  Prescriptions as written below.  Encouraged oral hydration, rest, continue monitor symptoms closely, close PCP/neurology follow-up.  ED return precautions discussed at length.  Patient states her understanding and agrees with plan.                      Clinical Impression:   Final diagnoses:  [R51.9] Acute nonintractable headache, unspecified headache type (Primary)          ED Disposition Condition    Discharge Stable        ED Prescriptions     Medication Sig Dispense Start Date End Date Auth. Provider    ketorolac (TORADOL) 10 mg tablet Take 1 tablet (10 mg total) by mouth every 6 (six) hours. for 5 days 20 tablet 7/21/2022 7/26/2022 Emre Stallworth PA-C    ondansetron (ZOFRAN-ODT) 4 MG TbDL Take 1 tablet (4 mg total) by mouth every 6 (six) hours as needed (nausea). 20 tablet 7/21/2022  Emre Stallworth PA-C        Follow-up Information     Follow up With Specialties Details Why Contact Info    Your Doctor               Emre Stallworth PA-C  07/21/22 0088

## 2022-07-21 NOTE — DISCHARGE INSTRUCTIONS

## 2022-07-22 ENCOUNTER — CLINICAL SUPPORT (OUTPATIENT)
Dept: REHABILITATION | Facility: HOSPITAL | Age: 42
End: 2022-07-22
Payer: COMMERCIAL

## 2022-07-22 DIAGNOSIS — R26.9 GAIT ABNORMALITY: Primary | ICD-10-CM

## 2022-07-22 DIAGNOSIS — R29.898 DECREASED STRENGTH OF LOWER EXTREMITY: ICD-10-CM

## 2022-07-22 DIAGNOSIS — M25.672 DECREASED RANGE OF MOTION OF LEFT ANKLE: ICD-10-CM

## 2022-07-22 PROCEDURE — 97112 NEUROMUSCULAR REEDUCATION: CPT | Mod: PN,CQ

## 2022-07-22 PROCEDURE — 97110 THERAPEUTIC EXERCISES: CPT | Mod: PN,CQ

## 2022-07-22 NOTE — PROGRESS NOTES
"[facility]   Physical Therapy Treatment Note     Name: Marilu Frausto  Clinic Number: [id number]    Therapy Diagnosis:   No diagnosis found.  Physician: Cory Martin DPM    Visit Date: 7/22/2022    Physician Orders: PT Eval and Treat   Medical Diagnosis from Referral:   S93.402S (ICD-10-CM) - Moderate left ankle sprain, sequela   M25.372 (ICD-10-CM) - Ankle joint instability, left      Evaluation Date: 6/21/2022  Authorization Period Expiration: 12/31/22  Plan of Care Expiration: 08/02/2022  Progress Note Due: 07/19/2022  Visit # / Visits authorized: 7/30  FOTO: 7/11 #2 completed 7/19/2022     Precautions: Standard     PTA Visit #: 2/5     Time In: 3:05 pm  Time Out: 4:05 pm  Total Treatment Time: 60 minutes  Total Billable Time: 47 minutes (1 NMR, 2 TE)    SUBJECTIVE     Patient reports: no complaints of ankle pain. States she went to ED yesterday with "9/10" Migraine headache.  "usually I can manage at home but this one no"  She was compliant with home exercise program.  Response to previous treatment: No adverse response; minimal soreness  Functional change: HEP compliance    Pain: 0/10  Location: left ankle     OBJECTIVE     Objective Measures updated at progress report unless specified.     Treatment     Marilu received the treatments listed below:      Therapeutic exercises to develop strength, endurance, ROM, flexibility, posture and core stabilization for 35 minutes with 1:1 additional 15 minutes with supervision including:  - Recumbent bike 7 min; Lv 4.0 for increased tissue extensibility  - Gastroc stretch on incline 3 x 30 sec  - Soleus stretch on incline 3 x 30 sec  - Ankle DF mob w red powerband post pull 5 sec hold; 3 min  - side stepping GTB @ knees x2 laps (1 lap = 2x25 ft)  - Monster walks GTB @ knees x2 laps (1 lap = 2x25 feet)  - DL Heel raises solid ground 2 x 20  - Seated heel raise: 35#; 3 x 15 5" hold  - Leg press bilateral lower extremity squat 4 x 10; " "8 plates, GTB at knees-- 1 min rest between sets  - SL clamshells; 3 x 15 w/ 5" hold    Not Today:  - Standing hip abd on green foam disc 2x10 B  - Leg press bilateral lower extremity heel raise on edge for full range 2x10; 3 plates     Neuromuscular re-education to improve proprioception and balance for 12 minutes including:  Tandem walking on airex beam x 4 laps of 2 x 8 feet each at / bar No upper extremity support  6'' step up to SLS +airex x 20  Tandem stance on airex 2x30"B trial eyes closed next visit.  Standing reciprocal march on airex 2x20  SLS on blue foam disc 3x30" left lower extremity      Manual therapy techniques for 00 minutes to improve range of motion/flexibility including:    Grade 3-4 posterior L talocrural joint mobilization  Prone manual L soleus stretch    Patient Education and Home Exercises     Home Exercises Provided and Patient Education Provided     Education provided:   - diagnosis/prognsis, goals for therapy, role of therapy for care, exercises     Written Home Exercises Provided: yes. Pt given red theraband for home use.  Exercises were reviewed and Marilu was able to demonstrate them prior to the end of the session.  Marilu demonstrated good  understanding of the education provided. See EMR under Patient Instructions for exercises provided during therapy sessions    ASSESSMENT     Marilu is improving well in strength and stability of her ankle as well as her L hip. Despite initial concerns regarding how much she could perform today due to headache, she was able to complete all therex as noted.  Pt showed good ankle strategy with no pain on step ups to airex, able to increase hold time with tandem and SLS balance challenges.  Completed all therex without complaint of increased pain after treatment.  States "good -better - no ankle pain" at end of session.    Marilu Is progressing well towards her goals.   Pt prognosis is Good.     Pt will continue to benefit from skilled " outpatient physical therapy to address the deficits listed in the problem list box on initial evaluation, provide pt/family education and to maximize pt's level of independence in the home and community environment.     Pt's spiritual, cultural and educational needs considered and pt agreeable to plan of care and goals.    Anticipated barriers to physical therapy: none    Goals:  Short Term Goals:  4 weeks  1.Report decreased L ankle pain  < / =  4/10  to increase tolerance for weight bearing  2. Increase ROM by 5 degrees in order to walk with min to no compensation.  3. Increase strength by 1/3 MMT grade for  L LE  to increase tolerance for ADL and work activities.  4. Pt to tolerate HEP to improve ROM and independence with ADL's     Long Term Goals: 8 weeks  1.Report decreased L ankle pain  < / =  1/10  to increase tolerance for community ambulat  2.Patient goal: to be pain free with swimming with her kids  3.Increase strength to 4+/5 for  L LE  to increase tolerance for ADL and work activities.  4. Pt to tolerate updated HEP to demonstrate independence in self-management of care after discharge.     PLAN     Plan of care Certification: 6/21/2022 to 08/02/2022.     Progress ankle stability and mobility.     Sonali Alvarado, PTA

## 2022-11-08 LAB — BCS RECOMMENDATION EXT: NORMAL

## 2022-12-20 ENCOUNTER — OFFICE VISIT (OUTPATIENT)
Dept: URGENT CARE | Facility: CLINIC | Age: 42
End: 2022-12-20
Payer: COMMERCIAL

## 2022-12-20 VITALS
RESPIRATION RATE: 17 BRPM | TEMPERATURE: 98 F | SYSTOLIC BLOOD PRESSURE: 135 MMHG | BODY MASS INDEX: 27.38 KG/M2 | WEIGHT: 145 LBS | HEIGHT: 61 IN | HEART RATE: 82 BPM | DIASTOLIC BLOOD PRESSURE: 84 MMHG | OXYGEN SATURATION: 96 %

## 2022-12-20 DIAGNOSIS — J40 BRONCHITIS: ICD-10-CM

## 2022-12-20 DIAGNOSIS — J06.9 URI, ACUTE: ICD-10-CM

## 2022-12-20 DIAGNOSIS — R05.9 COUGH, UNSPECIFIED TYPE: Primary | ICD-10-CM

## 2022-12-20 LAB
CTP QC/QA: YES
CTP QC/QA: YES
POC MOLECULAR INFLUENZA A AGN: NEGATIVE
POC MOLECULAR INFLUENZA B AGN: NEGATIVE
SARS-COV-2 AG RESP QL IA.RAPID: NEGATIVE

## 2022-12-20 PROCEDURE — 3075F SYST BP GE 130 - 139MM HG: CPT | Mod: CPTII,S$GLB,, | Performed by: FAMILY MEDICINE

## 2022-12-20 PROCEDURE — 3008F BODY MASS INDEX DOCD: CPT | Mod: CPTII,S$GLB,, | Performed by: FAMILY MEDICINE

## 2022-12-20 PROCEDURE — 87811 SARS-COV-2 COVID19 W/OPTIC: CPT | Mod: QW,S$GLB,, | Performed by: FAMILY MEDICINE

## 2022-12-20 PROCEDURE — 3079F PR MOST RECENT DIASTOLIC BLOOD PRESSURE 80-89 MM HG: ICD-10-PCS | Mod: CPTII,S$GLB,, | Performed by: FAMILY MEDICINE

## 2022-12-20 PROCEDURE — 87811 SARS CORONAVIRUS 2 ANTIGEN POCT, MANUAL READ: ICD-10-PCS | Mod: QW,S$GLB,, | Performed by: FAMILY MEDICINE

## 2022-12-20 PROCEDURE — 1159F PR MEDICATION LIST DOCUMENTED IN MEDICAL RECORD: ICD-10-PCS | Mod: CPTII,S$GLB,, | Performed by: FAMILY MEDICINE

## 2022-12-20 PROCEDURE — 3075F PR MOST RECENT SYSTOLIC BLOOD PRESS GE 130-139MM HG: ICD-10-PCS | Mod: CPTII,S$GLB,, | Performed by: FAMILY MEDICINE

## 2022-12-20 PROCEDURE — 87502 POCT INFLUENZA A/B MOLECULAR: ICD-10-PCS | Mod: QW,S$GLB,, | Performed by: FAMILY MEDICINE

## 2022-12-20 PROCEDURE — 99213 PR OFFICE/OUTPT VISIT, EST, LEVL III, 20-29 MIN: ICD-10-PCS | Mod: S$GLB,,, | Performed by: FAMILY MEDICINE

## 2022-12-20 PROCEDURE — 99213 OFFICE O/P EST LOW 20 MIN: CPT | Mod: S$GLB,,, | Performed by: FAMILY MEDICINE

## 2022-12-20 PROCEDURE — 1159F MED LIST DOCD IN RCRD: CPT | Mod: CPTII,S$GLB,, | Performed by: FAMILY MEDICINE

## 2022-12-20 PROCEDURE — 3079F DIAST BP 80-89 MM HG: CPT | Mod: CPTII,S$GLB,, | Performed by: FAMILY MEDICINE

## 2022-12-20 PROCEDURE — 3008F PR BODY MASS INDEX (BMI) DOCUMENTED: ICD-10-PCS | Mod: CPTII,S$GLB,, | Performed by: FAMILY MEDICINE

## 2022-12-20 PROCEDURE — 87502 INFLUENZA DNA AMP PROBE: CPT | Mod: QW,S$GLB,, | Performed by: FAMILY MEDICINE

## 2022-12-20 RX ORDER — PREDNISONE 20 MG/1
20 TABLET ORAL DAILY
Qty: 4 TABLET | Refills: 0 | Status: ON HOLD | OUTPATIENT
Start: 2022-12-20 | End: 2022-12-23 | Stop reason: HOSPADM

## 2022-12-20 RX ORDER — ALBUTEROL SULFATE 90 UG/1
2 AEROSOL, METERED RESPIRATORY (INHALATION) EVERY 6 HOURS PRN
Qty: 18 G | Refills: 3 | Status: SHIPPED | OUTPATIENT
Start: 2022-12-20 | End: 2023-06-04

## 2022-12-20 RX ORDER — BENZONATATE 100 MG/1
200 CAPSULE ORAL 3 TIMES DAILY PRN
Qty: 30 CAPSULE | Refills: 1 | Status: SHIPPED | OUTPATIENT
Start: 2022-12-20 | End: 2023-01-03

## 2022-12-20 RX ORDER — LORATADINE 10 MG/1
10 TABLET ORAL DAILY
Qty: 30 TABLET | Refills: 2 | Status: SHIPPED | OUTPATIENT
Start: 2022-12-20 | End: 2023-06-04

## 2022-12-20 NOTE — PROGRESS NOTES
"Subjective:       Patient ID: Marilu Frausto is a 42 y.o. female.    Vitals:  height is 5' 1" (1.549 m) and weight is 65.8 kg (145 lb). Her temperature is 98.4 °F (36.9 °C). Her blood pressure is 135/84 and her pulse is 82. Her respiration is 17 and oxygen saturation is 96%.     Chief Complaint: Cough    Patient presents to the clinic with cough, congestion and shortness of breath x a week , denies f/c or body ache      Cough  This is a new problem. The current episode started in the past 7 days. The cough is Non-productive. Associated symptoms include ear congestion, headaches, nasal congestion and postnasal drip. She has tried OTC cough suppressant for the symptoms. The treatment provided no relief.     HENT:  Positive for postnasal drip.    Respiratory:  Positive for cough.    Neurological:  Positive for headaches.     Objective:      Physical Exam   Constitutional: She is oriented to person, place, and time. She appears well-developed. She is cooperative.  Non-toxic appearance. She does not appear ill. No distress.   HENT:   Head: Normocephalic and atraumatic.   Ears:   Right Ear: Hearing, tympanic membrane, external ear and ear canal normal.   Left Ear: Hearing, tympanic membrane, external ear and ear canal normal.   Nose: Nose normal. No mucosal edema, rhinorrhea or nasal deformity. No epistaxis. Right sinus exhibits no maxillary sinus tenderness and no frontal sinus tenderness. Left sinus exhibits no maxillary sinus tenderness and no frontal sinus tenderness.   Mouth/Throat: Uvula is midline, oropharynx is clear and moist and mucous membranes are normal. Mucous membranes are moist. No trismus in the jaw. Normal dentition. No uvula swelling. No posterior oropharyngeal erythema. Oropharynx is clear.   Eyes: Conjunctivae and lids are normal. Right eye exhibits no discharge. Left eye exhibits no discharge. No scleral icterus.   Neck: Trachea normal and phonation normal. Neck supple.   Cardiovascular: Normal " rate, regular rhythm, normal heart sounds and normal pulses.   Pulmonary/Chest: Effort normal. No respiratory distress. She has wheezes. She has rhonchi.   Abdominal: Normal appearance and bowel sounds are normal. She exhibits no distension and no mass. Soft. There is no abdominal tenderness.   Musculoskeletal: Normal range of motion.         General: No deformity. Normal range of motion.   Neurological: She is alert and oriented to person, place, and time. She exhibits normal muscle tone. Coordination normal.   Skin: Skin is warm, dry, intact, not diaphoretic and not pale.   Psychiatric: Her speech is normal and behavior is normal. Judgment and thought content normal.   Nursing note and vitals reviewed.      Assessment:       1. Cough, unspecified type    2. URI, acute          Plan:         Cough, unspecified type  -     SARS Coronavirus 2 Antigen, POCT Manual Read    URI, acute           Results for orders placed or performed in visit on 12/20/22   SARS Coronavirus 2 Antigen, POCT Manual Read   Result Value Ref Range    SARS Coronavirus 2 Antigen Negative Negative     Acceptable Yes

## 2022-12-20 NOTE — ADDENDUM NOTE
Addended by: BRITTANY CARTER on: 12/20/2022 04:21 PM     Modules accepted: Orders     Patient understands condition, prognosis and need for follow up care.

## 2022-12-21 ENCOUNTER — CLINICAL SUPPORT (OUTPATIENT)
Dept: SMOKING CESSATION | Facility: CLINIC | Age: 42
End: 2022-12-21

## 2022-12-21 DIAGNOSIS — F17.200 NICOTINE DEPENDENCE: Primary | ICD-10-CM

## 2022-12-21 PROCEDURE — 99999 PR PBB SHADOW E&M-EST. PATIENT-LVL I: ICD-10-PCS | Mod: PBBFAC,,,

## 2022-12-21 PROCEDURE — 99406 PR TOBACCO USE CESSATION INTERMEDIATE 3-10 MINUTES: ICD-10-PCS | Mod: S$GLB,,,

## 2022-12-21 PROCEDURE — 99406 BEHAV CHNG SMOKING 3-10 MIN: CPT | Mod: S$GLB,,,

## 2022-12-21 PROCEDURE — 99999 PR PBB SHADOW E&M-EST. PATIENT-LVL I: CPT | Mod: PBBFAC,,,

## 2022-12-22 ENCOUNTER — HOSPITAL ENCOUNTER (OUTPATIENT)
Facility: HOSPITAL | Age: 42
Discharge: HOME | End: 2022-12-23
Attending: EMERGENCY MEDICINE | Admitting: HOSPITALIST
Payer: COMMERCIAL

## 2022-12-22 DIAGNOSIS — R09.02 HYPOXIA: Primary | ICD-10-CM

## 2022-12-22 DIAGNOSIS — R07.9 CHEST PAIN: ICD-10-CM

## 2022-12-22 DIAGNOSIS — R06.02 SHORTNESS OF BREATH: ICD-10-CM

## 2022-12-22 DIAGNOSIS — J10.1 INFLUENZA A: ICD-10-CM

## 2022-12-22 PROBLEM — J96.01 ACUTE RESPIRATORY FAILURE WITH HYPOXIA: Status: ACTIVE | Noted: 2022-12-22

## 2022-12-22 LAB
B-HCG UR QL: NEGATIVE
BASOPHILS # BLD AUTO: 0.12 K/UL (ref 0–0.2)
BASOPHILS NFR BLD: 0.7 % (ref 0–1.9)
BNP SERPL-MCNC: <10 PG/ML (ref 0–99)
CTP QC/QA: YES
CTP QC/QA: YES
D DIMER PPP IA.FEU-MCNC: 0.49 MG/L FEU
DIFFERENTIAL METHOD: ABNORMAL
EOSINOPHIL # BLD AUTO: 0.3 K/UL (ref 0–0.5)
EOSINOPHIL NFR BLD: 1.4 % (ref 0–8)
ERYTHROCYTE [DISTWIDTH] IN BLOOD BY AUTOMATED COUNT: 14.9 % (ref 11.5–14.5)
HCT VFR BLD AUTO: 38.4 % (ref 37–48.5)
HGB BLD-MCNC: 12.3 G/DL (ref 12–16)
IMM GRANULOCYTES # BLD AUTO: 0.07 K/UL (ref 0–0.04)
IMM GRANULOCYTES NFR BLD AUTO: 0.4 % (ref 0–0.5)
INFLUENZA A, MOLECULAR: POSITIVE
INFLUENZA B, MOLECULAR: NEGATIVE
LYMPHOCYTES # BLD AUTO: 1.4 K/UL (ref 1–4.8)
LYMPHOCYTES NFR BLD: 7.5 % (ref 18–48)
MCH RBC QN AUTO: 27.2 PG (ref 27–31)
MCHC RBC AUTO-ENTMCNC: 32 G/DL (ref 32–36)
MCV RBC AUTO: 85 FL (ref 82–98)
MONOCYTES # BLD AUTO: 0.7 K/UL (ref 0.3–1)
MONOCYTES NFR BLD: 3.9 % (ref 4–15)
NEUTROPHILS # BLD AUTO: 15.6 K/UL (ref 1.8–7.7)
NEUTROPHILS NFR BLD: 86.1 % (ref 38–73)
NRBC BLD-RTO: 0 /100 WBC
PLATELET # BLD AUTO: 617 K/UL (ref 150–450)
PMV BLD AUTO: 8.2 FL (ref 9.2–12.9)
RBC # BLD AUTO: 4.53 M/UL (ref 4–5.4)
SARS-COV-2 RDRP RESP QL NAA+PROBE: NEGATIVE
SPECIMEN SOURCE: ABNORMAL
TROPONIN I SERPL DL<=0.01 NG/ML-MCNC: <0.006 NG/ML (ref 0–0.03)
WBC # BLD AUTO: 18.11 K/UL (ref 3.9–12.7)

## 2022-12-22 PROCEDURE — 94640 AIRWAY INHALATION TREATMENT: CPT | Mod: XB

## 2022-12-22 PROCEDURE — G0378 HOSPITAL OBSERVATION PER HR: HCPCS

## 2022-12-22 PROCEDURE — 83880 ASSAY OF NATRIURETIC PEPTIDE: CPT | Performed by: EMERGENCY MEDICINE

## 2022-12-22 PROCEDURE — 87502 INFLUENZA DNA AMP PROBE: CPT

## 2022-12-22 PROCEDURE — 25000003 PHARM REV CODE 250: Performed by: EMERGENCY MEDICINE

## 2022-12-22 PROCEDURE — 99285 EMERGENCY DEPT VISIT HI MDM: CPT | Mod: 25

## 2022-12-22 PROCEDURE — 85379 FIBRIN DEGRADATION QUANT: CPT | Performed by: EMERGENCY MEDICINE

## 2022-12-22 PROCEDURE — A4216 STERILE WATER/SALINE, 10 ML: HCPCS | Performed by: NURSE PRACTITIONER

## 2022-12-22 PROCEDURE — 25000003 PHARM REV CODE 250: Performed by: NURSE PRACTITIONER

## 2022-12-22 PROCEDURE — 87502 INFLUENZA DNA AMP PROBE: CPT | Performed by: EMERGENCY MEDICINE

## 2022-12-22 PROCEDURE — 93005 ELECTROCARDIOGRAM TRACING: CPT

## 2022-12-22 PROCEDURE — 81025 URINE PREGNANCY TEST: CPT | Performed by: EMERGENCY MEDICINE

## 2022-12-22 PROCEDURE — 96374 THER/PROPH/DIAG INJ IV PUSH: CPT

## 2022-12-22 PROCEDURE — 84484 ASSAY OF TROPONIN QUANT: CPT | Performed by: EMERGENCY MEDICINE

## 2022-12-22 PROCEDURE — 94761 N-INVAS EAR/PLS OXIMETRY MLT: CPT

## 2022-12-22 PROCEDURE — 63600175 PHARM REV CODE 636 W HCPCS: Performed by: EMERGENCY MEDICINE

## 2022-12-22 PROCEDURE — 87635 SARS-COV-2 COVID-19 AMP PRB: CPT | Performed by: EMERGENCY MEDICINE

## 2022-12-22 PROCEDURE — 85025 COMPLETE CBC W/AUTO DIFF WBC: CPT | Performed by: EMERGENCY MEDICINE

## 2022-12-22 PROCEDURE — 25000242 PHARM REV CODE 250 ALT 637 W/ HCPCS: Performed by: EMERGENCY MEDICINE

## 2022-12-22 PROCEDURE — 93010 ELECTROCARDIOGRAM REPORT: CPT | Mod: ,,, | Performed by: INTERNAL MEDICINE

## 2022-12-22 PROCEDURE — 93010 EKG 12-LEAD: ICD-10-PCS | Mod: ,,, | Performed by: INTERNAL MEDICINE

## 2022-12-22 RX ORDER — METHYLPREDNISOLONE SOD SUCC 125 MG
125 VIAL (EA) INJECTION
Status: COMPLETED | OUTPATIENT
Start: 2022-12-22 | End: 2022-12-22

## 2022-12-22 RX ORDER — GUAIFENESIN 100 MG/5ML
200 SOLUTION ORAL EVERY 4 HOURS PRN
Status: DISCONTINUED | OUTPATIENT
Start: 2022-12-23 | End: 2022-12-23 | Stop reason: HOSPADM

## 2022-12-22 RX ORDER — TALC
6 POWDER (GRAM) TOPICAL NIGHTLY PRN
Status: DISCONTINUED | OUTPATIENT
Start: 2022-12-22 | End: 2022-12-23 | Stop reason: HOSPADM

## 2022-12-22 RX ORDER — NALOXONE HCL 0.4 MG/ML
0.02 VIAL (ML) INJECTION
Status: DISCONTINUED | OUTPATIENT
Start: 2022-12-22 | End: 2022-12-23 | Stop reason: HOSPADM

## 2022-12-22 RX ORDER — IPRATROPIUM BROMIDE AND ALBUTEROL SULFATE 2.5; .5 MG/3ML; MG/3ML
3 SOLUTION RESPIRATORY (INHALATION)
Status: COMPLETED | OUTPATIENT
Start: 2022-12-22 | End: 2022-12-22

## 2022-12-22 RX ORDER — SODIUM CHLORIDE 0.9 % (FLUSH) 0.9 %
10 SYRINGE (ML) INJECTION EVERY 8 HOURS
Status: DISCONTINUED | OUTPATIENT
Start: 2022-12-22 | End: 2022-12-23 | Stop reason: HOSPADM

## 2022-12-22 RX ORDER — ALBUTEROL SULFATE 2.5 MG/.5ML
10 SOLUTION RESPIRATORY (INHALATION)
Status: COMPLETED | OUTPATIENT
Start: 2022-12-22 | End: 2022-12-22

## 2022-12-22 RX ORDER — ONDANSETRON 2 MG/ML
4 INJECTION INTRAMUSCULAR; INTRAVENOUS EVERY 8 HOURS PRN
Status: DISCONTINUED | OUTPATIENT
Start: 2022-12-22 | End: 2022-12-23 | Stop reason: HOSPADM

## 2022-12-22 RX ORDER — ACETAMINOPHEN 325 MG/1
650 TABLET ORAL EVERY 8 HOURS PRN
Status: DISCONTINUED | OUTPATIENT
Start: 2022-12-22 | End: 2022-12-23 | Stop reason: HOSPADM

## 2022-12-22 RX ORDER — PROCHLORPERAZINE EDISYLATE 5 MG/ML
5 INJECTION INTRAMUSCULAR; INTRAVENOUS EVERY 6 HOURS PRN
Status: DISCONTINUED | OUTPATIENT
Start: 2022-12-22 | End: 2022-12-23 | Stop reason: HOSPADM

## 2022-12-22 RX ORDER — ENOXAPARIN SODIUM 100 MG/ML
40 INJECTION SUBCUTANEOUS EVERY 24 HOURS
Status: DISCONTINUED | OUTPATIENT
Start: 2022-12-23 | End: 2022-12-23 | Stop reason: HOSPADM

## 2022-12-22 RX ORDER — IPRATROPIUM BROMIDE AND ALBUTEROL SULFATE 2.5; .5 MG/3ML; MG/3ML
3 SOLUTION RESPIRATORY (INHALATION) EVERY 4 HOURS PRN
Status: DISCONTINUED | OUTPATIENT
Start: 2022-12-22 | End: 2022-12-23 | Stop reason: HOSPADM

## 2022-12-22 RX ORDER — OSELTAMIVIR PHOSPHATE 75 MG/1
75 CAPSULE ORAL
Status: COMPLETED | OUTPATIENT
Start: 2022-12-22 | End: 2022-12-22

## 2022-12-22 RX ORDER — OSELTAMIVIR PHOSPHATE 75 MG/1
75 CAPSULE ORAL DAILY
Status: DISCONTINUED | OUTPATIENT
Start: 2022-12-23 | End: 2022-12-23 | Stop reason: HOSPADM

## 2022-12-22 RX ORDER — POLYETHYLENE GLYCOL 3350 17 G/17G
17 POWDER, FOR SOLUTION ORAL DAILY PRN
Status: DISCONTINUED | OUTPATIENT
Start: 2022-12-22 | End: 2022-12-23 | Stop reason: HOSPADM

## 2022-12-22 RX ADMIN — ALBUTEROL SULFATE 10 MG: 2.5 SOLUTION RESPIRATORY (INHALATION) at 10:12

## 2022-12-22 RX ADMIN — METHYLPREDNISOLONE SODIUM SUCCINATE 125 MG: 125 INJECTION, POWDER, FOR SOLUTION INTRAMUSCULAR; INTRAVENOUS at 08:12

## 2022-12-22 RX ADMIN — IPRATROPIUM BROMIDE AND ALBUTEROL SULFATE 3 ML: .5; 3 SOLUTION RESPIRATORY (INHALATION) at 09:12

## 2022-12-22 RX ADMIN — Medication 10 ML: at 11:12

## 2022-12-22 RX ADMIN — OSELTAMIVIR PHOSPHATE 75 MG: 75 CAPSULE ORAL at 11:12

## 2022-12-23 ENCOUNTER — PATIENT MESSAGE (OUTPATIENT)
Dept: PRIMARY CARE CLINIC | Facility: CLINIC | Age: 42
End: 2022-12-23
Payer: COMMERCIAL

## 2022-12-23 VITALS
RESPIRATION RATE: 18 BRPM | BODY MASS INDEX: 30.22 KG/M2 | OXYGEN SATURATION: 91 % | WEIGHT: 160.06 LBS | TEMPERATURE: 99 F | HEIGHT: 61 IN | SYSTOLIC BLOOD PRESSURE: 104 MMHG | HEART RATE: 80 BPM | DIASTOLIC BLOOD PRESSURE: 61 MMHG

## 2022-12-23 PROBLEM — D75.839 THROMBOCYTOSIS: Status: ACTIVE | Noted: 2022-12-23

## 2022-12-23 PROBLEM — J96.01 ACUTE RESPIRATORY FAILURE WITH HYPOXIA: Status: RESOLVED | Noted: 2022-12-22 | Resolved: 2022-12-23

## 2022-12-23 LAB
BASOPHILS # BLD AUTO: 0.07 K/UL (ref 0–0.2)
BASOPHILS NFR BLD: 0.4 % (ref 0–1.9)
DIFFERENTIAL METHOD: ABNORMAL
EOSINOPHIL # BLD AUTO: 0 K/UL (ref 0–0.5)
EOSINOPHIL NFR BLD: 0.1 % (ref 0–8)
ERYTHROCYTE [DISTWIDTH] IN BLOOD BY AUTOMATED COUNT: 14.6 % (ref 11.5–14.5)
HCT VFR BLD AUTO: 38.2 % (ref 37–48.5)
HGB BLD-MCNC: 12.1 G/DL (ref 12–16)
IMM GRANULOCYTES # BLD AUTO: 0.1 K/UL (ref 0–0.04)
IMM GRANULOCYTES NFR BLD AUTO: 0.6 % (ref 0–0.5)
LYMPHOCYTES # BLD AUTO: 1.9 K/UL (ref 1–4.8)
LYMPHOCYTES NFR BLD: 11.4 % (ref 18–48)
MCH RBC QN AUTO: 26.8 PG (ref 27–31)
MCHC RBC AUTO-ENTMCNC: 31.7 G/DL (ref 32–36)
MCV RBC AUTO: 85 FL (ref 82–98)
MONOCYTES # BLD AUTO: 0.5 K/UL (ref 0.3–1)
MONOCYTES NFR BLD: 3.1 % (ref 4–15)
NEUTROPHILS # BLD AUTO: 14.2 K/UL (ref 1.8–7.7)
NEUTROPHILS NFR BLD: 84.4 % (ref 38–73)
NRBC BLD-RTO: 0 /100 WBC
PLATELET # BLD AUTO: 602 K/UL (ref 150–450)
PMV BLD AUTO: 8.1 FL (ref 9.2–12.9)
RBC # BLD AUTO: 4.51 M/UL (ref 4–5.4)
WBC # BLD AUTO: 16.86 K/UL (ref 3.9–12.7)

## 2022-12-23 PROCEDURE — 63600175 PHARM REV CODE 636 W HCPCS: Performed by: NURSE PRACTITIONER

## 2022-12-23 PROCEDURE — 94799 UNLISTED PULMONARY SVC/PX: CPT

## 2022-12-23 PROCEDURE — G0378 HOSPITAL OBSERVATION PER HR: HCPCS

## 2022-12-23 PROCEDURE — 25000003 PHARM REV CODE 250: Performed by: HOSPITALIST

## 2022-12-23 PROCEDURE — 25000003 PHARM REV CODE 250: Performed by: NURSE PRACTITIONER

## 2022-12-23 PROCEDURE — 85025 COMPLETE CBC W/AUTO DIFF WBC: CPT | Performed by: NURSE PRACTITIONER

## 2022-12-23 PROCEDURE — 94640 AIRWAY INHALATION TREATMENT: CPT

## 2022-12-23 PROCEDURE — 94761 N-INVAS EAR/PLS OXIMETRY MLT: CPT

## 2022-12-23 PROCEDURE — 25000242 PHARM REV CODE 250 ALT 637 W/ HCPCS: Performed by: NURSE PRACTITIONER

## 2022-12-23 PROCEDURE — 36415 COLL VENOUS BLD VENIPUNCTURE: CPT | Performed by: NURSE PRACTITIONER

## 2022-12-23 PROCEDURE — A4216 STERILE WATER/SALINE, 10 ML: HCPCS | Performed by: NURSE PRACTITIONER

## 2022-12-23 PROCEDURE — 96375 TX/PRO/DX INJ NEW DRUG ADDON: CPT

## 2022-12-23 RX ORDER — CARBAMAZEPINE 100 MG/1
TABLET, CHEWABLE ORAL
Status: DISCONTINUED
Start: 2022-12-23 | End: 2022-12-23 | Stop reason: HOSPADM

## 2022-12-23 RX ORDER — PROPRANOLOL HYDROCHLORIDE 60 MG/1
120 CAPSULE, EXTENDED RELEASE ORAL DAILY
Status: DISCONTINUED | OUTPATIENT
Start: 2022-12-23 | End: 2022-12-23 | Stop reason: HOSPADM

## 2022-12-23 RX ORDER — CARBAMAZEPINE 100 MG/1
600 CAPSULE, EXTENDED RELEASE ORAL 2 TIMES DAILY
Status: DISCONTINUED | OUTPATIENT
Start: 2022-12-23 | End: 2022-12-23 | Stop reason: HOSPADM

## 2022-12-23 RX ORDER — PROPRANOLOL HYDROCHLORIDE 20 MG/1
120 TABLET ORAL DAILY
Status: DISCONTINUED | OUTPATIENT
Start: 2022-12-23 | End: 2022-12-23

## 2022-12-23 RX ORDER — PREDNISONE 20 MG/1
20 TABLET ORAL DAILY
Status: DISCONTINUED | OUTPATIENT
Start: 2022-12-23 | End: 2022-12-23 | Stop reason: HOSPADM

## 2022-12-23 RX ORDER — OSELTAMIVIR PHOSPHATE 75 MG/1
75 CAPSULE ORAL DAILY
Qty: 4 CAPSULE | Refills: 0 | Status: SHIPPED | OUTPATIENT
Start: 2022-12-24 | End: 2022-12-27

## 2022-12-23 RX ORDER — FUROSEMIDE 10 MG/ML
INJECTION INTRAMUSCULAR; INTRAVENOUS
Status: DISCONTINUED
Start: 2022-12-23 | End: 2022-12-23 | Stop reason: HOSPADM

## 2022-12-23 RX ORDER — FUROSEMIDE 10 MG/ML
40 INJECTION INTRAMUSCULAR; INTRAVENOUS ONCE
Status: COMPLETED | OUTPATIENT
Start: 2022-12-23 | End: 2022-12-23

## 2022-12-23 RX ADMIN — CARBAMAZEPINE 600 MG: 100 CAPSULE, EXTENDED RELEASE ORAL at 09:12

## 2022-12-23 RX ADMIN — IPRATROPIUM BROMIDE AND ALBUTEROL SULFATE 3 ML: .5; 3 SOLUTION RESPIRATORY (INHALATION) at 07:12

## 2022-12-23 RX ADMIN — FUROSEMIDE 40 MG: 10 INJECTION, SOLUTION INTRAMUSCULAR; INTRAVENOUS at 09:12

## 2022-12-23 RX ADMIN — Medication 10 ML: at 02:12

## 2022-12-23 RX ADMIN — PROPRANOLOL HYDROCHLORIDE 120 MG: 60 CAPSULE, EXTENDED RELEASE ORAL at 09:12

## 2022-12-23 RX ADMIN — OSELTAMIVIR PHOSPHATE 75 MG: 75 CAPSULE ORAL at 09:12

## 2022-12-23 RX ADMIN — PREDNISONE 20 MG: 20 TABLET ORAL at 09:12

## 2022-12-23 RX ADMIN — GUAIFENESIN 200 MG: 200 SOLUTION ORAL at 09:12

## 2022-12-23 RX ADMIN — IPRATROPIUM BROMIDE AND ALBUTEROL SULFATE 3 ML: .5; 3 SOLUTION RESPIRATORY (INHALATION) at 04:12

## 2022-12-23 NOTE — PROGRESS NOTES
[facility] Medicine  Progress Note    Patient Name: Marilu Frausto  MRN: [id number]  Patient Class: OP- Observation   Admission Date: 12/22/2022  Length of Stay: 0 days  Attending Physician: Mitch Lopez, *  Primary Care Provider: Amrik Owen MD        Subjective:     Principal Problem:Acute respiratory failure with hypoxia        HPI:  Marilu Frausto is a 42-year-old female with a history of epilepsy and migraines who presented to the ED for the evaluation of cough, and fever. Patient reports her symptoms started on Sunday in which she visited the Urgent Care. She reported symptoms of cough, SOB, wheezing, and congestion There she was influenza and COVID negative. She was given inhaler, cough medication, and prednisone and discharged. Patient reports her symptoms worsened with increase SOB on ambulation prompting her visit here the the ED. She states her cough is sometimes productive. Her fever started today. She reports wheezing in the ED that has now resolved. She reports currently feeling better and comfortable. Oxygenation is currently 90% on RA. Of note, she reports having a positive influenza A test in October of this year. She denies body aches, chest pain, abdominal pain, changes in bowel or bladder, or sick contacts. The ED work up included noted tachypnea and hypoxia. WBC 18, CXR: Mildly coarsened bibasilar interstitial markings which are nonspecific, although could relate to reactive airways disease or viral respiratory illness.  No large confluent airspace consolidation appreciated. Positive influenza A. She was given a duo neb and steroids.         Overview/Hospital Course:  12/23: Patient admitted last night for increased shortness of breath after being found positive for Flu A in the ED. She states she was previously diagnosed w/ bronchitis at urgent care and prescribed prednisone. Patient states her shortness of breath is improved when she is lying still but she is  still experiencing GREER. She has been satting 90% on RA at rest. Will obtain home oxygen assessment to determine presence of hypoxia with ambulation.       Interval History: Patient in no acute distress. States she has not been coughing anything up but does feel congested. Mentioned mucinex but patient states she has tried that and it didn't really work so she declined it. Wheeze present in bilateral lungs after recent respiratory treatment. O2 stable at 90% w/o SOB at rest. Patient states GREER.     Review of Systems   Constitutional:  Negative for appetite change and fever.   HENT:  Negative for ear pain, sinus pressure, sinus pain and sore throat.    Eyes:  Negative for redness and itching.   Respiratory:  Positive for cough, shortness of breath and wheezing.         Chest congestion   Cardiovascular:  Negative for chest pain.   Gastrointestinal:  Negative for abdominal pain, nausea and vomiting.   Genitourinary:  Negative for decreased urine volume and difficulty urinating.   Musculoskeletal:  Negative for gait problem and myalgias.   Neurological:  Negative for dizziness, weakness, light-headedness, numbness and headaches.   Psychiatric/Behavioral:  Negative for agitation and confusion. The patient is not nervous/anxious.    Objective:     Vital Signs (Most Recent):  Temp: 98 °F (36.7 °C) (12/23/22 0839)  Pulse: 88 (12/23/22 0839)  Resp: 20 (12/23/22 0839)  BP: 132/85 (12/23/22 0839)  SpO2: (!) 90 % (12/23/22 0839)   Vital Signs (24h Range):  Temp:  [97.8 °F (36.6 °C)-98.4 °F (36.9 °C)] 98 °F (36.7 °C)  Pulse:  [] 88  Resp:  [18-24] 20  SpO2:  [90 %-94 %] 90 %  BP: (116-140)/(72-98) 132/85     Weight: 72.6 kg (160 lb 0.9 oz)  Body mass index is 30.24 kg/m².    Intake/Output Summary (Last 24 hours) at 12/23/2022 1150  Last data filed at 12/23/2022 0509  Gross per 24 hour   Intake 360 ml   Output 250 ml   Net 110 ml      Physical Exam  Vitals and nursing note reviewed.   Constitutional:       General: She is  not in acute distress.     Appearance: She is ill-appearing.   Cardiovascular:      Rate and Rhythm: Normal rate and regular rhythm.      Pulses: Normal pulses.      Heart sounds: Normal heart sounds.   Pulmonary:      Effort: Pulmonary effort is normal.      Breath sounds: Wheezing present.      Comments: Dry, hacking cough - non-productive  Abdominal:      General: Bowel sounds are normal.      Palpations: Abdomen is soft.   Musculoskeletal:         General: Normal range of motion.   Skin:     General: Skin is warm.   Neurological:      General: No focal deficit present.      Mental Status: She is alert and oriented to person, place, and time.   Psychiatric:         Mood and Affect: Mood normal.         Behavior: Behavior normal.         Thought Content: Thought content normal.         Judgment: Judgment normal.       Significant Labs: All pertinent labs within the past 24 hours have been reviewed.    Significant Imaging: I have reviewed all pertinent imaging results/findings within the past 24 hours.      Assessment/Plan:      * Acute respiratory failure with hypoxia  -Patient with Hypoxic respiratory failure which is Acute   -she is not on home oxygen.   -Supplemental oxygen was provided and noted- Nasal Cannula as needed  -Signs/symptoms of respiratory failure include- tachypnea and wheezing.   -Contributing diagnoses includes - influenza  -Labs and images were reviewed. Patient Has not has a recent ABG  -Continue supplemental oxygen; titrate and wean to maintain SpO2 >90%  -Duo-nebs PRN for SOB/Wheezing  -ABG PRN   -Tamiflu x5 days  -Resume prescribed prednisone for remaining 2 days  -Obtain home oxygen assessment  -1x dose lasix for wheezing after duo-nebs - per patient she has had good UOP    Thrombocytosis  Plt 602  Likely reactive  Monitor      Influenza A  -See above    Epilepsy  -Resume home Tegretol 200 mg, 3 tabs BID    Migraine without status migrainosus, not intractable  -Resume propranolol 120 mg  daily      VTE Risk Mitigation (From admission, onward)           Ordered     enoxaparin injection 40 mg  Daily         12/22/22 2301     IP VTE HIGH RISK PATIENT  Once         12/22/22 2300     Place sequential compression device  Until discontinued         12/22/22 2300                    Discharge Planning   AVA:      Code Status: Full Code   Is the patient medically ready for discharge?:     Reason for patient still in hospital (select all that apply): Patient trending condition and Treatment  Discharge Plan A: Home with family Krystle Vazquez NP  Department of [facility] Medicine   [facility]

## 2022-12-23 NOTE — PLAN OF CARE
12/23/22 0044   Admission   Initial VN Admission Questions Complete   Communication Issues? None   Shift   Virtual Nurse - Rounding Complete   Pain Management Interventions pain management plan reviewed with patient/caregiver   Virtual Nurse - Patient Verbalized Approval Of Camera Use;VN Rounding   Type of Frequent Check   Type Patient Rounds   Safety/Activity   Patient Rounds bed in low position;call light in patient/parent reach;clutter free environment maintained;placement of personal items at bedside;visualized patient   Safety Promotion/Fall Prevention Fall Risk reviewed with patient/family;medications reviewed;side rails raised x 2;instructed to call staff for mobility;nonskid shoes/socks when out of bed   Positioning   Body Position sitting up in bed     Pt arrived to unit. Introduced self as VN for this shift. Admission questions completed by VN. Educated pt on VTE risk, safety precautions, and VN's role in pt care. Opportunity given for pt's questions. All questions answered.

## 2022-12-23 NOTE — PLAN OF CARE
SW met with pt via [employer] to complete assessment. Pt is AxO x3 and able to verbally answer assessment questions.  Pt confirmed demographic information. Pt reports to live at home with her spouse and children. SW to update pt emergency contacts as her 9yr old son is listed. Pt would like just her spouse George as emergency contact. Pt reports while at home being independent of ADL's and no DME in the home. Pt reports ability to drive and attend follow up appointments. Pt reports at time of discharge pt to drive herself home. SW updated whiteboard with [facility] name and contact information. SW confirmed pt understanding of Observation unit and expected discharge plan. SW will continue to follow pt throughout care and assist with any discharge needs.         12/23/22 1004   Discharge Planning   Assessment Type Discharge Planning Brief Assessment   Resource/Environmental Concerns none   Support Systems Spouse/significant other;Children;Family members   Equipment Currently Used at Home none   Current Living Arrangements home   Patient/Family Anticipates Transition to home with family   Patient/Family Anticipated Services at Transition none   DME Needed Upon Discharge  none   Discharge Plan A Home with family     No future appointments.    JUDITH [facility] Case Management  [phone]

## 2022-12-23 NOTE — HOSPITAL COURSE
12/23: Patient admitted last night for increased shortness of breath after being found positive for Flu A in the ED. She states she was previously diagnosed w/ bronchitis at urgent care and prescribed prednisone. Patient states her shortness of breath is improved when she is lying still but she is still experiencing GREER. She has been satting 90% on RA at rest. Home oxygen assessment done - patient remained at 90% during ambulation. Discussed discharging today vs tomorrow with her but explained that it is unlikely that her oxygen sats will make a significant jump overnight. She states that she can do the same things we are doing here while at home and would like to discharge today. Will discharge patient on 4 more days of tamiflu to complete 5 day course. Encouraged continued use of I.S. and inhaler PRN that was previously prescribed at urgent care. Wheezing present on previous assessment this morning is now gone. Patient has completed the course of prednisone that was previously prescribed at urgent care - will not re-order. Educated patient on resting, fluids, and not over-exerting herself. She verbalized understanding. Patient stable for discharge.

## 2022-12-23 NOTE — PROGRESS NOTES
Discharge orders noted. Additional clinical references attached. Patient's discharge instructions given by bedside RN and reviewed via this VN.  Education provided on new medication, diagnosis, and follow-up appointments.  Teach back method used. Patient verbalized understanding. All questions answered. Transport to [facility] requested. Floor nurse notified.      12/23/22 9878   AVS Confirmation   Discharge instructions and AVS given to and reviewed with patient and/or significant other. Yes

## 2022-12-23 NOTE — ASSESSMENT & PLAN NOTE
-Patient with Hypoxic respiratory failure which is Acute   -she is not on home oxygen.   -Supplemental oxygen was provided and noted- Nasal Cannula as needed  -Signs/symptoms of respiratory failure include- tachypnea and wheezing.   -Contributing diagnoses includes - influenza  -Labs and images were reviewed. Patient Has not has a recent ABG  -Continue supplemental oxygen; titrate and wean to maintain SpO2 >90%  -Duo-nebs PRN for SOB/Wheezing  -ABG PRN   -Tamiflu x5 days  -Resume prescribed prednisone for remaining 2 days

## 2022-12-23 NOTE — PROGRESS NOTES
Future Appointments   Date Time Provider Department Center   1/9/2023  1:00 PM Amrik Owen MD [facility]

## 2022-12-23 NOTE — PLAN OF CARE
AOX4. VS stable. Safety maintained. Meds given per MAR. Denies pain and N/V at this time. Regular diet maintained. Resting quietly. SR up X 2. Call light in reach. Bed alarm set. Will continue to monitor.       Problem: Adult Inpatient Plan of Care  Goal: Plan of Care Review  Outcome: Ongoing, Progressing  Goal: Patient-Specific Goal (Individualized)  Outcome: Ongoing, Progressing     Problem: Infection  Goal: Absence of Infection Signs and Symptoms  Outcome: Ongoing, Progressing

## 2022-12-23 NOTE — PLAN OF CARE
D/C recs noted. Pt to have PCP follow up. Pharmacist will go over home medications and reasons for medications. VN and bedside nurse to reiterate final discharge instructions.       At time of discharge pt will be transported home by herself with her car in the parking lot.     DME at discharge:  Home Health:    Pt has follow up appointments added to AVS.         12/23/22 1611   Final Note   Assessment Type Final Discharge Note   Anticipated Discharge Disposition Home   What phone number can be called within the next 1-3 days to see how you are doing after discharge? [phone]   Hospital Resources/Appts/Education Provided Appointments scheduled and added to AVS   Post-Acute Status   Discharge Delays None known at this time     No future appointments.    JUDITH [facility] Case Management  [phone]

## 2022-12-23 NOTE — DISCHARGE SUMMARY
[facility] Medicine  Discharge Summary      Patient Name: Marilu Frausto  MRN: [id number]  ALIREZA: [id number]  Patient Class: OP- Observation  Admission Date: 12/22/2022  Hospital Length of Stay: 0 days  Discharge Date and Time:  12/23/2022 4:55 PM  Attending Physician: Mitch Lopez, *   Discharging Provider: Krystle Vazquez NP  Primary Care Provider: Amrik Owen MD    Primary Care Team: Networked reference to record PCT     HPI:   Marilu Frausto is a 42-year-old female with a history of epilepsy and migraines who presented to the ED for the evaluation of cough, and fever. Patient reports her symptoms started on Sunday in which she visited the Urgent Care. She reported symptoms of cough, SOB, wheezing, and congestion There she was influenza and COVID negative. She was given inhaler, cough medication, and prednisone and discharged. Patient reports her symptoms worsened with increase SOB on ambulation prompting her visit here the the ED. She states her cough is sometimes productive. Her fever started today. She reports wheezing in the ED that has now resolved. She reports currently feeling better and comfortable. Oxygenation is currently 90% on RA. Of note, she reports having a positive influenza A test in October of this year. She denies body aches, chest pain, abdominal pain, changes in bowel or bladder, or sick contacts. The ED work up included noted tachypnea and hypoxia. WBC 18, CXR: Mildly coarsened bibasilar interstitial markings which are nonspecific, although could relate to reactive airways disease or viral respiratory illness.  No large confluent airspace consolidation appreciated. Positive influenza A. She was given a duo neb and steroids.         * No surgery found *      Hospital Course:   12/23: Patient admitted last night for increased shortness of breath after being found positive for Flu A in the ED. She states she was previously diagnosed w/ bronchitis at urgent care and  prescribed prednisone. Patient states her shortness of breath is improved when she is lying still but she is still experiencing GREER. She has been satting 90% on RA at rest. Home oxygen assessment done - patient remained at 90% during ambulation. Discussed discharging today vs tomorrow with her but explained that it is unlikely that her oxygen sats will make a significant jump overnight. She states that she can do the same things we are doing here while at home and would like to discharge today. Will discharge patient on 4 more days of tamiflu to complete 5 day course. Encouraged continued use of I.S. and inhaler PRN that was previously prescribed at urgent care. Wheezing present on previous assessment this morning is now gone. Patient has completed the course of prednisone that was previously prescribed at urgent care - will not re-order. Educated patient on resting, fluids, and not over-exerting herself. She verbalized understanding. Patient stable for discharge.       Goals of Care Treatment Preferences:  Code Status: Full Code      Consults:     * Acute respiratory failure with hypoxia-resolved as of 12/23/2022  -Patient with Hypoxic respiratory failure which is Acute   -she is not on home oxygen.   -Supplemental oxygen was provided and noted- Nasal Cannula as needed  -Signs/symptoms of respiratory failure include- tachypnea and wheezing.   -Contributing diagnoses includes - influenza  -Labs and images were reviewed. Patient Has not has a recent ABG  -Continue supplemental oxygen; titrate and wean to maintain SpO2 >90%  -Duo-nebs PRN for SOB/Wheezing  -ABG PRN   -Tamiflu x5 days  -Resume prescribed prednisone for remaining 2 days  -Obtain home oxygen assessment  -1x dose lasix for wheezing after duo-nebs   -Wheezing resolved upon repeat assessment    Thrombocytosis  · Plt 602  · Likely reactive  · Monitor      Influenza A  -Continue tamiflu for 4 more days to complete 5 day course  -Encourage rest and  fluids    Epilepsy  -Continue home Tegretol 200 mg, 3 tabs BID    Migraine without status migrainosus, not intractable  -Continue propranolol 120 mg daily      Final Active Diagnoses:    Diagnosis Date Noted POA    Thrombocytosis [D75.839] 12/23/2022 Yes    Influenza A [J10.1] 12/22/2022 Yes    Epilepsy [G40.909] 12/22/2021 Yes    Migraine without status migrainosus, not intractable [G43.909] 12/22/2021 Yes      Problems Resolved During this Admission:    Diagnosis Date Noted Date Resolved POA    PRINCIPAL PROBLEM:  Acute respiratory failure with hypoxia [J96.01] 12/22/2022 12/23/2022 Yes       Discharged Condition: stable    Disposition: Home or Self Care    Follow Up:   Follow-up Information     Amrik Owen MD Follow up on 1/9/2023.    Specialty: Family Medicine  Why: 1:00 pm  Contact information:  [address]  [address]  [facility] [address]  [phone]                       Patient Instructions:      Diet Adult Regular     Activity as tolerated   Order Comments: Take breaks during activity and rest to prevent shortness of breath.       Significant Diagnostic Studies: Labs:   BMP: No results for input(s): GLU, NA, K, CL, CO2, BUN, CREATININE, CALCIUM, MG in the last 48 hours. and CBC   Recent Labs   Lab 12/22/22 2047 12/23/22  0558   WBC 18.11* 16.86*   HGB 12.3 12.1   HCT 38.4 38.2   * 602*       Pending Diagnostic Studies:     None         Medications:  Reconciled Home Medications:      Medication List      START taking these medications    oseltamivir 75 MG capsule  Commonly known as: TAMIFLU  Take 1 capsule (75 mg total) by mouth once daily. for 4 days  Start taking on: December 24, 2022        CONTINUE taking these medications    AJOVY SYRINGE 225 mg/1.5 mL injection  Generic drug: fremanezumab-vfrm  INJECT 1 SYRINGE INTO THE SKIN ONCE A MONTH     albuterol 90 mcg/actuation inhaler  Commonly known as: VENTOLIN HFA  Inhale 2 puffs into the lungs every 6 (six) hours as needed for Wheezing.  Rescue     benzonatate 100 MG capsule  Commonly known as: TESSALON PERLES  Take 2 capsules (200 mg total) by mouth 3 (three) times daily as needed for Cough.     ezetimibe 10 mg tablet  Commonly known as: ZETIA  Take 1 tablet (10 mg total) by mouth once daily.     INNOPRAN  mg Cp24  Generic drug: propranoloL  TK 1 C PO QAM     loratadine 10 mg tablet  Commonly known as: CLARITIN  Take 1 tablet (10 mg total) by mouth once daily.     nicotine (polacrilex) 2 mg Gum  Commonly known as: NICORETTE  Take 1 each (2 mg total) by mouth as needed (as needed). Can take 1-2 per hour in place of a cigarette.     ondansetron 4 MG Tbdl  Commonly known as: ZOFRAN-ODT  Take 1 tablet (4 mg total) by mouth every 6 (six) hours as needed (nausea).     TEGretol  MG 12 hr tablet  Generic drug: carBAMazepine  TK 3 TS PO BID        STOP taking these medications    predniSONE 20 MG tablet  Commonly known as: DELTASONE            Indwelling Lines/Drains at time of discharge:   Lines/Drains/Airways     None                 Time spent on the discharge of patient: 20 minutes         Krystle Vazquez NP  Department of Hospital Medicine  [facility] S[facility]

## 2022-12-23 NOTE — SUBJECTIVE & OBJECTIVE
Interval History: Patient in no acute distress. States she has not been coughing anything up but does feel congested. Mentioned mucinex but patient states she has tried that and it didn't really work so she declined it. Wheeze present in bilateral lungs after recent respiratory treatment. O2 stable at 90% w/o SOB at rest. Patient states GREER.     Review of Systems   Constitutional:  Negative for appetite change and fever.   HENT:  Negative for ear pain, sinus pressure, sinus pain and sore throat.    Eyes:  Negative for redness and itching.   Respiratory:  Positive for cough, shortness of breath and wheezing.         Chest congestion   Cardiovascular:  Negative for chest pain.   Gastrointestinal:  Negative for abdominal pain, nausea and vomiting.   Genitourinary:  Negative for decreased urine volume and difficulty urinating.   Musculoskeletal:  Negative for gait problem and myalgias.   Neurological:  Negative for dizziness, weakness, light-headedness, numbness and headaches.   Psychiatric/Behavioral:  Negative for agitation and confusion. The patient is not nervous/anxious.    Objective:     Vital Signs (Most Recent):  Temp: 98 °F (36.7 °C) (12/23/22 0839)  Pulse: 88 (12/23/22 0839)  Resp: 20 (12/23/22 0839)  BP: 132/85 (12/23/22 0839)  SpO2: (!) 90 % (12/23/22 0839)   Vital Signs (24h Range):  Temp:  [97.8 °F (36.6 °C)-98.4 °F (36.9 °C)] 98 °F (36.7 °C)  Pulse:  [] 88  Resp:  [18-24] 20  SpO2:  [90 %-94 %] 90 %  BP: (116-140)/(72-98) 132/85     Weight: 72.6 kg (160 lb 0.9 oz)  Body mass index is 30.24 kg/m².    Intake/Output Summary (Last 24 hours) at 12/23/2022 1150  Last data filed at 12/23/2022 0509  Gross per 24 hour   Intake 360 ml   Output 250 ml   Net 110 ml      Physical Exam  Vitals and nursing note reviewed.   Constitutional:       General: She is not in acute distress.     Appearance: She is ill-appearing.   Cardiovascular:      Rate and Rhythm: Normal rate and regular rhythm.      Pulses: Normal  pulses.      Heart sounds: Normal heart sounds.   Pulmonary:      Effort: Pulmonary effort is normal.      Breath sounds: Wheezing present.      Comments: Dry, hacking cough - non-productive  Abdominal:      General: Bowel sounds are normal.      Palpations: Abdomen is soft.   Musculoskeletal:         General: Normal range of motion.   Skin:     General: Skin is warm.   Neurological:      General: No focal deficit present.      Mental Status: She is alert and oriented to person, place, and time.   Psychiatric:         Mood and Affect: Mood normal.         Behavior: Behavior normal.         Thought Content: Thought content normal.         Judgment: Judgment normal.       Significant Labs: All pertinent labs within the past 24 hours have been reviewed.    Significant Imaging: I have reviewed all pertinent imaging results/findings within the past 24 hours.

## 2022-12-23 NOTE — PLAN OF CARE
Pt for d/c to home   Per SW's note - pt will drive self home   f/u apt:    Future Appointments   Date Time Provider Department Center   1/9/2023  1:00 PM Amrik Owen MD [facility]     no dme, no hh noted   Discharging nurse to review d/c meds/instructions        12/23/22 4742   Final Note   Assessment Type Final Discharge Note   Anticipated Discharge Disposition Home   What phone number can be called within the next 1-3 days to see how you are doing after discharge? [phone]   Hospital Resources/Appts/Education Provided Appointments scheduled and added to AVS   Post-Acute Status   Discharge Delays None known at this time

## 2022-12-23 NOTE — NURSING
Home Oxygen Evaluation    Date Performed: 2022    1) Patient's Home O2 Sat on room air, while at rest: 91%        If O2 sats on room air at rest are 88% or below, patient qualifies. No additional testing needed. Document N/A in steps 2 and 3. If 89% or above, complete steps 2.      2) Patient's O2 Sat on room air while exercisin%        If O2 sats on room air while exercising remain 89% or above patient does not qualify, no further testing needed Document N/A in step 3. If O2 sats on room air while exercising are 88% or below, continue to step 3.      3) Patient's O2 Sat while exercising on O2: n/a         (Must show improvement from #2 for patients to qualify)    If O2 sats improve on oxygen, patient qualifies for portable oxygen. If not, the patient does not qualify.

## 2022-12-23 NOTE — PLAN OF CARE
VN note: Patient chart, labs, and vitals reviewed. VN to continue to be available as needed.     Problem: Adult Inpatient Plan of Care  Goal: Plan of Care Review  12/23/2022 0041 by Alyssa Kitchen RN  Outcome: Ongoing, Progressing  12/23/2022 0041 by Alyssa Kitchen, RN  Outcome: Ongoing, Progressing

## 2022-12-23 NOTE — ASSESSMENT & PLAN NOTE
-Patient with Hypoxic respiratory failure which is Acute   -she is not on home oxygen.   -Supplemental oxygen was provided and noted- Nasal Cannula as needed  -Signs/symptoms of respiratory failure include- tachypnea and wheezing.   -Contributing diagnoses includes - influenza  -Labs and images were reviewed. Patient Has not has a recent ABG  -Continue supplemental oxygen; titrate and wean to maintain SpO2 >90%  -Duo-nebs PRN for SOB/Wheezing  -ABG PRN   -Tamiflu x5 days  -Resume prescribed prednisone for remaining 2 days  -Obtain home oxygen assessment  -1x dose lasix for wheezing after duo-nebs   -Wheezing resolved upon repeat assessment

## 2022-12-23 NOTE — ED PROVIDER NOTES
Chief Complaint:  Cough, sputum production, fever    History of Present Illness:    Marilu Frausto 42 y.o. with a  has a past medical history of Epilepsy and Migraines. who presents to the emergency department today with a complaint of cough, sputum production and fever that started on .  She notes that she did have some sinus type issues that started a week ago.  She attributed it to the weather change.  Those symptoms resolved however on  she started with cough, congestion, shortness of breath and wheezing.  She was seen in an urgent care and prescribed albuterol and steroids, she reports that the coughing, shortness of breath and wheezing are just getting worse.  She has green sputum production now.  She reports having fever that started today.  Yesterday she started with a point tender pain in the right side of her back that is worse with deep breaths.  She reports she gets short of breath when she lies down and when she is walking.  She has been using the albuterol with no relief.  She reports her Tessalon Perles do not help with her cough either.  She denies any hemoptysis, leg swelling, palpitations.  Denies any nausea, vomiting, diarrhea, abdominal pain.        ROS    Constitutional: No fever, no chills.  Eyes: No discharge. No pain.  ENT: No nasal drainage. No ear ache. No sore throat.  Cardiovascular: No chest pain, no palpitations.  Respiratory:  See above  Gastrointestinal: No abdominal pain, no vomiting. No diarrhea.  Genitourinary: No hematuria, dysuria, urgency.  Musculoskeletal:  See above  Skin: No rashes, no lesions.  Neurological: No headache, no focal weakness.    Otherwise remaining ROS negative     The history is provided by the patient      Reviewed and verified by myself:   PMH/PSH/SOC/FH REVIEWED :  Marilu Frausto  has a past medical history of Epilepsy and Migraines. and   has a past surgical history that includes  section. with  reports that she quit smoking  about a year ago. Her smoking use included cigarettes. She has never used smokeless tobacco. She reports that she does not currently use alcohol. and a family history is not on file.      Nursing/Ancillary staff note reviewed.  ALLERGIES REVIEWED  MEDICATIONS REVIEWED  VS reviewed         Physical Exam     ED Triage Vitals [12/22/22 2010]   BP (!) 140/98   Pulse 100   Resp 18   Temp 98.4 °F (36.9 °C)   SpO2 (!) 94 %       Physical Exam    Constitutional: The patient is alert, has no immediate need for airway protection and no signs of toxicity. No acute distress. Lying in bed but able to sit up without difficulty.   Eyes: Pupils equal and round no pallor or injection. Extra ocular movements intact. No drainage.   ENT: Mucous membranes are moist. Oropharynx clear.   Neck: Neck is supple non-tender. No lymphadenopathy. No stridor.   Respiratory: There are no retractions, lungs have with some crackles at the base bilaterally. No wheezing.  Cardiovascular: Regular rate and rhythm. No murmurs, rubs or gallops.  Chest/Breast: Chest wall nontender to palpation.   Gastrointestinal:  Abdomen is soft and non-tender, no masses, bowel sounds normal. No guarding, no rebound.  No pulsatile mass.   Neurological: Alert and oriented x 4. CN II-XII grossly intact. No focal weakness. Strength intact 5/5 bilaterally in upper and lower extremities.   Skin: Warm and dry, no rashes.  Musculoskeletal: Extremities are non-tender, non-swollen and have full range of motion. Back NTTP along the midline.   Psyc: Normal behavior. Linear thought content.     DIFFERENTIAL DIAGNOSIS: After history and physical exam a differential diagnosis was considered, but was not limited to, pulmonary infectious process, COPD, asthma, pulmonary embolus and congestive heart failure.              I independently interpreted the lab results and it showed the following:  Leukocytosis at 18,000, flu positive, D-dimer normal, BNP normal, troponin normal,        I  independently interpreted the radiology study and it shows:   CXR: No acute process, no consolidation, no pneumothorax, no widened mediastinum, read by myself, read by radiology pending.       I independently ordered and interpreted the EKG and it showed: 100 bpm, normal sinus rhythm, normal axis, no S1 Q 3 T3, no STEMI, read by myself read by cardiology pending.           ED Course     Medications   albuterol sulfate nebulizer solution 10 mg (has no administration in time range)   oseltamivir capsule 75 mg (has no administration in time range)   methylPREDNISolone sodium succinate injection 125 mg (125 mg Intravenous Given 12/22/22 2056)   albuterol-ipratropium 2.5 mg-0.5 mg/3 mL nebulizer solution 3 mL (3 mLs Nebulization Given by Other 12/22/22 2102)         ED Course as of 12/22/22 2255   Thu Dec 22, 2022   2050 Influenza A, Molecular(!): Positive [JA]   2130 Patient feeling improved her wheezing improved. [JA]   2141 WBC(!): 18.11  Leukocytosis but may be due to her steroids  [JA]   2142 D-Dimer: 0.49  Normal [JA]   2142 BNP: <10  Normal [JA]   2142 Troponin I: <0.006  Normal [JA]   2142 SARS-CoV-2 RNA, Amplification, Qual: Negative  Neg [JA]   2220 Patient reports feeling a little bit of tightness returning.  She has slight wheezing at this time will continue to closely monitor.  Awaiting chest x-ray. [JA]   2245 The patient's wheezing has return.  She desats to 90% when sitting in the bed.  Will give her another breathing treatment.  Plan is to admit for hypoxia secondary to flu. [JA]      ED Course User Index  [JA] Zahida Zamarripa MD              Medical Decision Making:   History:   I obtained history from:  The patient  Old Medical Records: I decided to obtain old medical records.  Patient was in urgent care on 12/20 tested negative for flu and COVID.  Placed on albuterol, Tessalon Perles and prednisone.  Seen by Neurology 12/14/2022 for partial symptomatic epilepsy with partial seizures.  pts PCP is  Amrik Owen MD.          Initial Assessment:   This is the emergent evaluation Marilu Frausto 42 y.o. female who presents to the ED today for shortness of breath, worsening cold symptoms.  Will obtain CBC, BNP, flu COVID swab, troponin BNP D-dimer, chest x-ray and EKG.      .  Initial vitals signs were   ED Triage Vitals [12/22/22 2010]   BP (!) 140/98   Pulse 100   Resp 18   Temp 98.4 °F (36.9 °C)   SpO2 (!) 94 %   .     Differential Diagnosis:   DDX: ,pulmonary infectious process, COPD, asthma, pulmonary embolus and congestive heart failure.    Independently Interpreted Test(s):   Labs, chest x-ray and EKG see body of note      Clinical Tests:   Lab Tests: Ordered and Reviewed  Radiological Study: Ordered and Reviewed  Medical Tests: Ordered and Reviewed    ED Management:  Management.   Risk - high  Other:   I have discussed this case with another health care provider.       <> Summary of the Discussion:  I spoke with [facility] hospitalist for admission, [facility]ro, they accept.  Attending Dr. Lopez      ED Management:  Marilu Frausto   who  has a past medical history of Epilepsy and Migraines.  Who presents to the emergency Department today with worsening shortness of breath.  She is found to be hypoxic despite steroids and nebulizer treatment.  She is positive for flu.  She will be admitted for further care and management, supplemental O2, scheduled nebs steroids.  Will give her Tamiflu since she is positive for flu.  The patient is comfortable with admission.          Voice recognition software utilized in this note.              Impression      The primary encounter diagnosis was Hypoxia. Diagnoses of Shortness of breath and Influenza A were also pertinent to this visit.            Critical Care   Date/Time:   Performed by: Zahida Zamarripa MD  Authorized by: Zahida Zamarripa MD   Total critical care time (exclusive of procedural time) : 31 minutes  Critical care time was exclusive of  separately billable procedures and treating other patients.  Critical care was necessary to treat or prevent imminent or life-threatening deterioration of the following conditions:  Hypoxia, need for multiple nebs in multiple reassessments.  Critical care was time spent personally by me on the following activities: development of treatment plan with patient or surrogate, interpretation of cardiac output measurements, examination of patient, evaluation of patient's response to treatment, obtaining history from patient or surrogate, ordering and performing treatments and interventions, ordering and review of radiographic studies, ordering and review of laboratory studies, pulse oximetry, review of old charts and re-evaluation of patient's condition.           Zahida Zamarripa MD  12/22/22 3359

## 2022-12-23 NOTE — HPI
Marilu Frausto is a 42-year-old female with a history of epilepsy and migraines who presented to the ED for the evaluation of cough, and fever. Patient reports her symptoms started on Sunday in which she visited the Urgent Care. She reported symptoms of cough, SOB, wheezing, and congestion There she was influenza and COVID negative. She was given inhaler, cough medication, and prednisone and discharged. Patient reports her symptoms worsened with increase SOB on ambulation prompting her visit here the the ED. She states her cough is sometimes productive. Her fever started today. She reports wheezing in the ED that has now resolved. She reports currently feeling better and comfortable. Oxygenation is currently 90% on RA. Of note, she reports having a positive influenza A test in October of this year. She denies body aches, chest pain, abdominal pain, changes in bowel or bladder, or sick contacts. The ED work up included noted tachypnea and hypoxia. WBC 18, CXR: Mildly coarsened bibasilar interstitial markings which are nonspecific, although could relate to reactive airways disease or viral respiratory illness.  No large confluent airspace consolidation appreciated. Positive influenza A. She was given a duo neb and steroids.

## 2022-12-23 NOTE — SUBJECTIVE & OBJECTIVE
Past Medical History:   Diagnosis Date    Epilepsy     Migraines        Past Surgical History:   Procedure Laterality Date     SECTION         Review of patient's allergies indicates:  No Known Allergies    No current facility-administered medications on file prior to encounter.     Current Outpatient Medications on File Prior to Encounter   Medication Sig    AJOVY 225 mg/1.5 mL injection INJECT 1 SYRINGE INTO THE SKIN ONCE A MONTH    albuterol (VENTOLIN HFA) 90 mcg/actuation inhaler Inhale 2 puffs into the lungs every 6 (six) hours as needed for Wheezing. Rescue    benzonatate (TESSALON PERLES) 100 MG capsule Take 2 capsules (200 mg total) by mouth 3 (three) times daily as needed for Cough.    ezetimibe (ZETIA) 10 mg tablet Take 1 tablet (10 mg total) by mouth once daily.    INNOPRAN  mg Cp24 TK 1 C PO QAM    loratadine (CLARITIN) 10 mg tablet Take 1 tablet (10 mg total) by mouth once daily.    nicotine, polacrilex, (NICORETTE) 2 mg Gum Take 1 each (2 mg total) by mouth as needed (as needed). Can take 1-2 per hour in place of a cigarette.    ondansetron (ZOFRAN-ODT) 4 MG TbDL Take 1 tablet (4 mg total) by mouth every 6 (six) hours as needed (nausea).    predniSONE (DELTASONE) 20 MG tablet Take 1 tablet (20 mg total) by mouth once daily. for 4 days    TEGRETOL  mg 12 hr tablet TK 3 TS PO BID     Family History    None       Tobacco Use    Smoking status: Former     Types: Cigarettes     Quit date: 2021     Years since quittin.0    Smokeless tobacco: Never   Substance and Sexual Activity    Alcohol use: Not Currently    Drug use: Not on file    Sexual activity: Not on file     Review of Systems   Constitutional:  Positive for fever.   HENT: Negative.     Eyes: Negative.    Respiratory:  Positive for cough, shortness of breath and wheezing.    Cardiovascular: Negative.    Gastrointestinal: Negative.    Endocrine: Negative.    Genitourinary: Negative.    Musculoskeletal: Negative.    Skin:  Negative.    Neurological: Negative.    Psychiatric/Behavioral: Negative.     Objective:     Vital Signs (Most Recent):  Temp: 98.1 °F (36.7 °C) (12/23/22 0007)  Pulse: 82 (12/23/22 0007)  Resp: 20 (12/23/22 0007)  BP: 116/72 (12/23/22 0007)  SpO2: (!) 90 % (12/23/22 0007)   Vital Signs (24h Range):  Temp:  [98.1 °F (36.7 °C)-98.4 °F (36.9 °C)] 98.1 °F (36.7 °C)  Pulse:  [] 82  Resp:  [18-24] 20  SpO2:  [90 %-94 %] 90 %  BP: (116-140)/(72-98) 116/72     Weight: 72.6 kg (160 lb 0.9 oz)  Body mass index is 30.24 kg/m².    Physical Exam  Vitals and nursing note reviewed.   Constitutional:       Appearance: Normal appearance. She is not ill-appearing or diaphoretic.   HENT:      Head: Normocephalic and atraumatic.      Mouth/Throat:      Mouth: Mucous membranes are moist.   Eyes:      Pupils: Pupils are equal, round, and reactive to light.   Cardiovascular:      Rate and Rhythm: Normal rate and regular rhythm.      Pulses: Normal pulses.      Heart sounds: Normal heart sounds. No murmur heard.  Pulmonary:      Effort: Pulmonary effort is normal. No respiratory distress.      Comments: Breath sounds coarse on inspiration  Abdominal:      General: Abdomen is flat. There is no distension.      Palpations: Abdomen is soft.      Tenderness: There is no abdominal tenderness.   Musculoskeletal:         General: No swelling. Normal range of motion.      Cervical back: Normal range of motion.   Skin:     General: Skin is warm and dry.      Capillary Refill: Capillary refill takes 2 to 3 seconds.   Neurological:      General: No focal deficit present.      Mental Status: She is alert and oriented to person, place, and time. Mental status is at baseline.   Psychiatric:         Mood and Affect: Mood normal.         Behavior: Behavior normal.         Thought Content: Thought content normal.         Judgment: Judgment normal.         CRANIAL NERVES     CN III, IV, VI   Pupils are equal, round, and reactive to light.      Significant Labs: All pertinent labs within the past 24 hours have been reviewed.    Significant Imaging: I have reviewed all pertinent imaging results/findings within the past 24 hours.

## 2022-12-23 NOTE — H&P
[facility] Medicine  History & Physical    Patient Name: Marilu Frausto  MRN: [id number]  Patient Class: OP- Observation  Admission Date: 12/22/2022  Attending Physician: Mitch Lopez, *   Primary Care Provider: Amrik Owen MD         Patient information was obtained from patient, past medical records and ER records.     Subjective:     Principal Problem:Acute respiratory failure with hypoxia    Chief Complaint:   Chief Complaint   Patient presents with    Shortness of Breath     Patient reports chest congestion with bright green sputum since last Thursday.  Patient reports fever of 100 today.  No body aches or chills.  Seen at urgent care two days ago with negative covid and flus tests.  Diagnosed with bronchitis at that time, without antibiotics, with inhaler and steroids prescribed.  SOB increases when supine and with activity.          HPI: Marilu Frausto is a 42-year-old female with a history of epilepsy and migraines who presented to the ED for the evaluation of cough, and fever. Patient reports her symptoms started on Sunday in which she visited the Urgent Care. She reported symptoms of cough, SOB, wheezing, and congestion There she was influenza and COVID negative. She was given inhaler, cough medication, and prednisone and discharged. Patient reports her symptoms worsened with increase SOB on ambulation prompting her visit here the the ED. She states her cough is sometimes productive. Her fever started today. She reports wheezing in the ED that has now resolved. She reports currently feeling better and comfortable. Oxygenation is currently 90% on RA. Of note, she reports having a positive influenza A test in October of this year. She denies body aches, chest pain, abdominal pain, changes in bowel or bladder, or sick contacts. The ED work up included noted tachypnea and hypoxia. WBC 18, CXR: Mildly coarsened bibasilar interstitial markings which are nonspecific, although  could relate to reactive airways disease or viral respiratory illness.  No large confluent airspace consolidation appreciated. Positive influenza A. She was given a duo neb and steroids.         Past Medical History:   Diagnosis Date    Epilepsy     Migraines        Past Surgical History:   Procedure Laterality Date     SECTION         Review of patient's allergies indicates:  No Known Allergies    No current facility-administered medications on file prior to encounter.     Current Outpatient Medications on File Prior to Encounter   Medication Sig    AJOVY 225 mg/1.5 mL injection INJECT 1 SYRINGE INTO THE SKIN ONCE A MONTH    albuterol (VENTOLIN HFA) 90 mcg/actuation inhaler Inhale 2 puffs into the lungs every 6 (six) hours as needed for Wheezing. Rescue    benzonatate (TESSALON PERLES) 100 MG capsule Take 2 capsules (200 mg total) by mouth 3 (three) times daily as needed for Cough.    ezetimibe (ZETIA) 10 mg tablet Take 1 tablet (10 mg total) by mouth once daily.    INNOPRAN  mg Cp24 TK 1 C PO QAM    loratadine (CLARITIN) 10 mg tablet Take 1 tablet (10 mg total) by mouth once daily.    nicotine, polacrilex, (NICORETTE) 2 mg Gum Take 1 each (2 mg total) by mouth as needed (as needed). Can take 1-2 per hour in place of a cigarette.    ondansetron (ZOFRAN-ODT) 4 MG TbDL Take 1 tablet (4 mg total) by mouth every 6 (six) hours as needed (nausea).    predniSONE (DELTASONE) 20 MG tablet Take 1 tablet (20 mg total) by mouth once daily. for 4 days    TEGRETOL  mg 12 hr tablet TK 3 TS PO BID     Family History    None       Tobacco Use    Smoking status: Former     Types: Cigarettes     Quit date: 2021     Years since quittin.0    Smokeless tobacco: Never   Substance and Sexual Activity    Alcohol use: Not Currently    Drug use: Not on file    Sexual activity: Not on file     Review of Systems   Constitutional:  Positive for fever.   HENT: Negative.     Eyes: Negative.     Respiratory:  Positive for cough, shortness of breath and wheezing.    Cardiovascular: Negative.    Gastrointestinal: Negative.    Endocrine: Negative.    Genitourinary: Negative.    Musculoskeletal: Negative.    Skin: Negative.    Neurological: Negative.    Psychiatric/Behavioral: Negative.     Objective:     Vital Signs (Most Recent):  Temp: 98.1 °F (36.7 °C) (12/23/22 0007)  Pulse: 82 (12/23/22 0007)  Resp: 20 (12/23/22 0007)  BP: 116/72 (12/23/22 0007)  SpO2: (!) 90 % (12/23/22 0007)   Vital Signs (24h Range):  Temp:  [98.1 °F (36.7 °C)-98.4 °F (36.9 °C)] 98.1 °F (36.7 °C)  Pulse:  [] 82  Resp:  [18-24] 20  SpO2:  [90 %-94 %] 90 %  BP: (116-140)/(72-98) 116/72     Weight: 72.6 kg (160 lb 0.9 oz)  Body mass index is 30.24 kg/m².    Physical Exam  Vitals and nursing note reviewed.   Constitutional:       Appearance: Normal appearance. She is not ill-appearing or diaphoretic.   HENT:      Head: Normocephalic and atraumatic.      Mouth/Throat:      Mouth: Mucous membranes are moist.   Eyes:      Pupils: Pupils are equal, round, and reactive to light.   Cardiovascular:      Rate and Rhythm: Normal rate and regular rhythm.      Pulses: Normal pulses.      Heart sounds: Normal heart sounds. No murmur heard.  Pulmonary:      Effort: Pulmonary effort is normal. No respiratory distress.      Comments: Breath sounds coarse on inspiration  Abdominal:      General: Abdomen is flat. There is no distension.      Palpations: Abdomen is soft.      Tenderness: There is no abdominal tenderness.   Musculoskeletal:         General: No swelling. Normal range of motion.      Cervical back: Normal range of motion.   Skin:     General: Skin is warm and dry.      Capillary Refill: Capillary refill takes 2 to 3 seconds.   Neurological:      General: No focal deficit present.      Mental Status: She is alert and oriented to person, place, and time. Mental status is at baseline.   Psychiatric:         Mood and Affect: Mood normal.          Behavior: Behavior normal.         Thought Content: Thought content normal.         Judgment: Judgment normal.         CRANIAL NERVES     CN III, IV, VI   Pupils are equal, round, and reactive to light.     Significant Labs: All pertinent labs within the past 24 hours have been reviewed.    Significant Imaging: I have reviewed all pertinent imaging results/findings within the past 24 hours.    Assessment/Plan:     * Acute respiratory failure with hypoxia  -Patient with Hypoxic respiratory failure which is Acute   -she is not on home oxygen.   -Supplemental oxygen was provided and noted- Nasal Cannula as needed  -Signs/symptoms of respiratory failure include- tachypnea and wheezing.   -Contributing diagnoses includes - influenza  -Labs and images were reviewed. Patient Has not has a recent ABG  -Continue supplemental oxygen; titrate and wean to maintain SpO2 >90%  -Duo-nebs PRN for SOB/Wheezing  -ABG PRN   -Tamiflu x5 days  -Resume prescribed prednisone for remaining 2 days    Influenza A  -See above    Epilepsy  -Resume home Tegretol 200 mg, 3 tabs BID    Migraine without status migrainosus, not intractable  -Resume propranolol 120 mg daily      VTE Risk Mitigation (From admission, onward)         Ordered     enoxaparin injection 40 mg  Daily         12/22/22 2301     IP VTE HIGH RISK PATIENT  Once         12/22/22 2300     Place sequential compression device  Until discontinued         12/22/22 2300                 Vale Guerrero DNP, [facility]  Hospitalist   Department of Hospital Medicine   [facility]   Office #: [phone]

## 2022-12-23 NOTE — ASSESSMENT & PLAN NOTE
-Patient with Hypoxic respiratory failure which is Acute   -she is not on home oxygen.   -Supplemental oxygen was provided and noted- Nasal Cannula as needed  -Signs/symptoms of respiratory failure include- tachypnea and wheezing.   -Contributing diagnoses includes - influenza  -Labs and images were reviewed. Patient Has not has a recent ABG  -Continue supplemental oxygen; titrate and wean to maintain SpO2 >90%  -Duo-nebs PRN for SOB/Wheezing  -ABG PRN   -Tamiflu x5 days  -Resume prescribed prednisone for remaining 2 days  -Obtain home oxygen assessment  -1x dose lasix for wheezing after duo-nebs

## 2022-12-27 ENCOUNTER — PATIENT MESSAGE (OUTPATIENT)
Dept: PRIMARY CARE CLINIC | Facility: CLINIC | Age: 42
End: 2022-12-27

## 2022-12-27 ENCOUNTER — OFFICE VISIT (OUTPATIENT)
Dept: PRIMARY CARE CLINIC | Facility: CLINIC | Age: 42
End: 2022-12-27
Payer: COMMERCIAL

## 2022-12-27 VITALS
DIASTOLIC BLOOD PRESSURE: 81 MMHG | HEART RATE: 94 BPM | OXYGEN SATURATION: 91 % | BODY MASS INDEX: 30.37 KG/M2 | SYSTOLIC BLOOD PRESSURE: 114 MMHG | TEMPERATURE: 99 F | WEIGHT: 160.69 LBS

## 2022-12-27 DIAGNOSIS — J96.01 ACUTE RESPIRATORY FAILURE WITH HYPOXIA: ICD-10-CM

## 2022-12-27 DIAGNOSIS — Z87.891 FORMER SMOKER: ICD-10-CM

## 2022-12-27 DIAGNOSIS — J10.1 INFLUENZA A: Primary | ICD-10-CM

## 2022-12-27 PROCEDURE — 99205 OFFICE O/P NEW HI 60 MIN: CPT | Mod: S$GLB,,, | Performed by: INTERNAL MEDICINE

## 2022-12-27 PROCEDURE — 1159F MED LIST DOCD IN RCRD: CPT | Mod: CPTII,S$GLB,, | Performed by: INTERNAL MEDICINE

## 2022-12-27 PROCEDURE — 99999 PR PBB SHADOW E&M-EST. PATIENT-LVL III: ICD-10-PCS | Mod: PBBFAC,,, | Performed by: INTERNAL MEDICINE

## 2022-12-27 PROCEDURE — 3074F PR MOST RECENT SYSTOLIC BLOOD PRESSURE < 130 MM HG: ICD-10-PCS | Mod: CPTII,S$GLB,, | Performed by: INTERNAL MEDICINE

## 2022-12-27 PROCEDURE — 3008F BODY MASS INDEX DOCD: CPT | Mod: CPTII,S$GLB,, | Performed by: INTERNAL MEDICINE

## 2022-12-27 PROCEDURE — 99205 PR OFFICE/OUTPT VISIT, NEW, LEVL V, 60-74 MIN: ICD-10-PCS | Mod: S$GLB,,, | Performed by: INTERNAL MEDICINE

## 2022-12-27 PROCEDURE — 3079F PR MOST RECENT DIASTOLIC BLOOD PRESSURE 80-89 MM HG: ICD-10-PCS | Mod: CPTII,S$GLB,, | Performed by: INTERNAL MEDICINE

## 2022-12-27 PROCEDURE — 1159F PR MEDICATION LIST DOCUMENTED IN MEDICAL RECORD: ICD-10-PCS | Mod: CPTII,S$GLB,, | Performed by: INTERNAL MEDICINE

## 2022-12-27 PROCEDURE — 99999 PR PBB SHADOW E&M-EST. PATIENT-LVL III: CPT | Mod: PBBFAC,,, | Performed by: INTERNAL MEDICINE

## 2022-12-27 PROCEDURE — 3074F SYST BP LT 130 MM HG: CPT | Mod: CPTII,S$GLB,, | Performed by: INTERNAL MEDICINE

## 2022-12-27 PROCEDURE — 3079F DIAST BP 80-89 MM HG: CPT | Mod: CPTII,S$GLB,, | Performed by: INTERNAL MEDICINE

## 2022-12-27 PROCEDURE — 3008F PR BODY MASS INDEX (BMI) DOCUMENTED: ICD-10-PCS | Mod: CPTII,S$GLB,, | Performed by: INTERNAL MEDICINE

## 2022-12-27 NOTE — PROGRESS NOTES
[facility]   New Visit Progress Note   Recent Hospital Discharge     PRESENTING HISTORY     Chief Complaint/Reason for Admission:  Follow up Hospital Discharge   PCP: Amrik Owen MD    History of Present Illness:  Ms. Marilu Frausto is a 42 y.o. female who was recently admitted to the hospital.    [facility]  Discharge Summary        Patient Name: Marilu Frausto  MRN: [id number]  ALIREZA: [id number]  Patient Class: OP- Observation  Admission Date: 12/22/2022  Hospital Length of Stay: 0 days  Discharge Date and Time:  12/23/2022 4:55 PM  Attending Physician: Mitch Lopez, *   Discharging Provider: Krystle Vazquez NP  Primary Care Provider: Amrik Owen MD  ___________________________________________________________________    Today:  Presents to [facility] for initial hospital follow up.  Evaluated at Urgent Care on 12/20/22 for viral syndrome, cough, and SOB.  COVID and Influenza swabs NEG; patient discharged with Albuterol INH and course of prednisone.   Experienced further deterioration in the home setting and presented to ED on 12/22/22.  Hypoxic at 90% and Influenza A positive.   Chest X ray with non specific bibasilar interstitial markings.   Admitted to [facility] service.  Duonebs, steroids, and Tamiflu administered.   Respiratory status stabilized with NC oxygen which was rapidly weaned.   Patient discharged to home to complete additional 5 days Tamiflu.  Respiratory status stable on room air at time of discharge.     Patient unaccompanied today.  Ambulatory and independent with ADL's.  Reports compliance with all medication.  Still with productive cough and shortness of breath with exertion.  Has pulse oximeter at home and reports dips to 88% on room air, but with quick recovery.   Completed Tamiflu as prescribed.     Ambulated in office while monitored.  90% on room air at rest.  88% on room air with exertion and ambulation.  Recovered to 90% at  rest.       Review of Systems  General ROS: negative for chills, fever or weight loss  Psychological ROS: negative for hallucination, depression or suicidal ideation  Ophthalmic ROS: negative for blurry vision, photophobia or eye pain  ENT ROS: negative for epistaxis, sore throat or rhinorrhea  Respiratory ROS: + productive cough, + shortness of breath, no wheezing  Cardiovascular ROS: no chest pain +dyspnea on exertion  Gastrointestinal ROS: no abdominal pain, change in bowel habits, or black/ bloody stools  Genito-Urinary ROS: no dysuria, trouble voiding, or hematuria  Musculoskeletal ROS: negative for gait disturbance or muscular weakness  Neurological ROS: no syncope or seizures; no ataxia  Dermatological ROS: negative for pruritis, rash and jaundice      PAST HISTORY:     Past Medical History:   Diagnosis Date    Epilepsy     Migraines        Past Surgical History:   Procedure Laterality Date     SECTION         No family history on file.      MEDICATIONS & ALLERGIES:     Current Outpatient Medications on File Prior to Visit   Medication Sig Dispense Refill    AJOVY 225 mg/1.5 mL injection INJECT 1 SYRINGE INTO THE SKIN ONCE A MONTH      albuterol (VENTOLIN HFA) 90 mcg/actuation inhaler Inhale 2 puffs into the lungs every 6 (six) hours as needed for Wheezing. Rescue 18 g 3    benzonatate (TESSALON PERLES) 100 MG capsule Take 2 capsules (200 mg total) by mouth 3 (three) times daily as needed for Cough. 30 capsule 1    ezetimibe (ZETIA) 10 mg tablet Take 1 tablet (10 mg total) by mouth once daily. 90 tablet 2    INNOPRAN  mg Cp24 TK 1 C PO QAM  6    loratadine (CLARITIN) 10 mg tablet Take 1 tablet (10 mg total) by mouth once daily. 30 tablet 2    nicotine, polacrilex, (NICORETTE) 2 mg Gum Take 1 each (2 mg total) by mouth as needed (as needed). Can take 1-2 per hour in place of a cigarette. 100 each 0    ondansetron (ZOFRAN-ODT) 4 MG TbDL Take 1 tablet (4 mg total) by mouth every 6 (six) hours as  needed (nausea). 20 tablet 0    oseltamivir (TAMIFLU) 75 MG capsule Take 1 capsule (75 mg total) by mouth once daily. for 4 days 4 capsule 0    TEGRETOL  mg 12 hr tablet TK 3 TS PO BID  6     No current facility-administered medications on file prior to visit.        Review of patient's allergies indicates:  No Known Allergies    OBJECTIVE:     Vital Signs:  /81 (BP Location: Right arm, Patient Position: Sitting, BP Method: Small (Automatic))   Pulse 94   Temp 98.6 °F (37 °C) (Oral)   Wt 72.9 kg (160 lb 11.5 oz)   LMP  (LMP Unknown)   SpO2 (!) 91%   BMI 30.37 kg/m²   Wt Readings from Last 3 Encounters:   12/22/22 2359 72.6 kg (160 lb 0.9 oz)   12/22/22 2010 73 kg (160 lb 15 oz)   12/20/22 1439 65.8 kg (145 lb)   07/21/22 1132 65.8 kg (145 lb)     Body mass index is 30.37 kg/m².        Physical Exam:  /81 (BP Location: Right arm, Patient Position: Sitting, BP Method: Small (Automatic))   Pulse 94   Temp 98.6 °F (37 °C) (Oral)   Wt 72.9 kg (160 lb 11.5 oz)   LMP  (LMP Unknown)   SpO2 (!) 91%   BMI 30.37 kg/m²   General appearance: alert, cooperative, no distress  Constitutional:Oriented to person, place, and time  + appears well-developed and well-nourished.   HEENT: Normocephalic, atraumatic, neck symmetrical, no nasal discharge   Eyes: conjunctivae/corneas clear, PERRL, EOM's intact  Lungs: clear to auscultation bilaterally, no dullness to percussion bilaterally  Heart: regular rate and rhythm without rub; no displacement of the PMI   Abdomen: soft, non-tender; bowel sounds normoactive; no organomegaly  Extremities: extremities symmetric; no clubbing, cyanosis, or edema  Integument: Skin color, texture, turgor normal; no rashes; hair distrubution normal  Neurologic: Alert and oriented X 3, normal strength, normal coordination and gait  Psychiatric: no pressured speech; normal affect; no evidence of impaired cognition     Laboratory  Lab Results   Component Value Date    WBC 16.86 (H)  12/23/2022    HGB 12.1 12/23/2022    HCT 38.2 12/23/2022    MCV 85 12/23/2022     (H) 12/23/2022     BMP  Lab Results   Component Value Date     12/22/2021    K 4.5 12/22/2021     12/22/2021    CO2 22 (L) 12/22/2021    BUN 16 12/22/2021    CREATININE 0.8 12/22/2021    CALCIUM 9.1 12/22/2021    ANIONGAP 9 12/22/2021     Lab Results   Component Value Date    ALT 22 12/22/2021    AST 14 12/22/2021    ALKPHOS 123 12/22/2021    BILITOT 0.3 12/22/2021     No results found for: INR, PROTIME  No results found for: HGBA1C    Diagnostic Results:    Chest X Ray 12/22/22:  Mildly coarsened bibasilar interstitial markings which are nonspecific, although could relate to reactive airways disease or viral respiratory illness.  No large confluent airspace consolidation appreciated.    ASSESSMENT & PLAN:         Influenza A  - completed Tamiflu as prescribed    Acute respiratory failure with hypoxia  - required NC supplemental oxygen while hospitalized but weaned rapidly  - continues with mild hypoxia, low of 88% with exertion but not sustained and recovers rapidly to 90% with rest  - patient advised to continue to monitor at home- call office if numbers trend below 88%   - patient with significant tobacco hx, may benefit from further work up and/or pulmonary consultation if hypoxia persists - will defer to PCP     Former smoker   - 1 ppd x 20 years  - quite 1 year ago while enrolled in tobacco cessation program       [facility] will [facility] released [facility].  She will see her PCP, Dr Owen, 1/9/23.     Instructions for the patient:      Scheduled Follow-up :  Future Appointments   Date Time Provider Department Center   1/9/2023  1:00 PM Amrik Owen MD [facility]       Post Visit Medication List:     Medication List            Accurate as of December 27, 2022 10:21 AM. If you have any questions, ask your nurse or doctor.                CONTINUE taking these medications      AJOVY SYRINGE 225  mg/1.5 mL injection  Generic drug: fremanezumab-vfrm     albuterol 90 mcg/actuation inhaler  Commonly known as: VENTOLIN HFA  Inhale 2 puffs into the lungs every 6 (six) hours as needed for Wheezing. Rescue     benzonatate 100 MG capsule  Commonly known as: TESSALON PERLES  Take 2 capsules (200 mg total) by mouth 3 (three) times daily as needed for Cough.     ezetimibe 10 mg tablet  Commonly known as: ZETIA  Take 1 tablet (10 mg total) by mouth once daily.     INNOPRAN  mg Cp24  Generic drug: propranoloL     loratadine 10 mg tablet  Commonly known as: CLARITIN  Take 1 tablet (10 mg total) by mouth once daily.     nicotine (polacrilex) 2 mg Gum  Commonly known as: NICORETTE  Take 1 each (2 mg total) by mouth as needed (as needed). Can take 1-2 per hour in place of a cigarette.     ondansetron 4 MG Tbdl  Commonly known as: ZOFRAN-ODT  Take 1 tablet (4 mg total) by mouth every 6 (six) hours as needed (nausea).     TEGretol  MG 12 hr tablet  Generic drug: carBAMazepine              Signing Physician:  Gavi Sofia MD

## 2023-01-03 ENCOUNTER — HOSPITAL ENCOUNTER (INPATIENT)
Facility: HOSPITAL | Age: 43
LOS: 3 days | Discharge: HOME | DRG: 189 | End: 2023-01-07
Attending: STUDENT IN AN ORGANIZED HEALTH CARE EDUCATION/TRAINING PROGRAM | Admitting: INTERNAL MEDICINE
Payer: COMMERCIAL

## 2023-01-03 ENCOUNTER — OFFICE VISIT (OUTPATIENT)
Dept: FAMILY MEDICINE | Facility: CLINIC | Age: 43
End: 2023-01-03
Payer: COMMERCIAL

## 2023-01-03 VITALS
WEIGHT: 157.88 LBS | TEMPERATURE: 99 F | BODY MASS INDEX: 29.81 KG/M2 | HEART RATE: 114 BPM | SYSTOLIC BLOOD PRESSURE: 118 MMHG | DIASTOLIC BLOOD PRESSURE: 80 MMHG | OXYGEN SATURATION: 92 % | HEIGHT: 61 IN

## 2023-01-03 DIAGNOSIS — R05.9 COUGH: ICD-10-CM

## 2023-01-03 DIAGNOSIS — J06.9 RECENT URI: ICD-10-CM

## 2023-01-03 DIAGNOSIS — R07.9 CHEST PAIN: ICD-10-CM

## 2023-01-03 DIAGNOSIS — R05.9 COUGH, UNSPECIFIED TYPE: ICD-10-CM

## 2023-01-03 DIAGNOSIS — R09.02 HYPOXIA: Primary | ICD-10-CM

## 2023-01-03 DIAGNOSIS — R06.02 SOB (SHORTNESS OF BREATH): ICD-10-CM

## 2023-01-03 DIAGNOSIS — J18.9 PNEUMONIA OF BOTH LUNGS DUE TO INFECTIOUS ORGANISM, UNSPECIFIED PART OF LUNG: Primary | ICD-10-CM

## 2023-01-03 DIAGNOSIS — R09.02 HYPOXIA: ICD-10-CM

## 2023-01-03 LAB
ALLENS TEST: ABNORMAL
ALLENS TEST: ABNORMAL
ANION GAP SERPL CALC-SCNC: 11 MMOL/L (ref 8–16)
BASOPHILS # BLD AUTO: 0.15 K/UL (ref 0–0.2)
BASOPHILS NFR BLD: 0.9 % (ref 0–1.9)
BUN SERPL-MCNC: 9 MG/DL (ref 6–20)
CALCIUM SERPL-MCNC: 9.6 MG/DL (ref 8.7–10.5)
CHLORIDE SERPL-SCNC: 102 MMOL/L (ref 95–110)
CO2 SERPL-SCNC: 23 MMOL/L (ref 23–29)
CREAT SERPL-MCNC: 0.7 MG/DL (ref 0.5–1.4)
DELSYS: ABNORMAL
DELSYS: ABNORMAL
DIFFERENTIAL METHOD: ABNORMAL
EOSINOPHIL # BLD AUTO: 0.3 K/UL (ref 0–0.5)
EOSINOPHIL NFR BLD: 1.8 % (ref 0–8)
ERYTHROCYTE [DISTWIDTH] IN BLOOD BY AUTOMATED COUNT: 14.4 % (ref 11.5–14.5)
EST. GFR  (NO RACE VARIABLE): >60 ML/MIN/1.73 M^2
GLUCOSE SERPL-MCNC: 104 MG/DL (ref 70–110)
HCO3 UR-SCNC: 21.9 MMOL/L (ref 24–28)
HCO3 UR-SCNC: 25.2 MMOL/L (ref 24–28)
HCT VFR BLD AUTO: 42.6 % (ref 37–48.5)
HGB BLD-MCNC: 13 G/DL (ref 12–16)
IMM GRANULOCYTES # BLD AUTO: 0.14 K/UL (ref 0–0.04)
IMM GRANULOCYTES NFR BLD AUTO: 0.9 % (ref 0–0.5)
INFLUENZA A, MOLECULAR: NOT DETECTED
INFLUENZA B, MOLECULAR: NOT DETECTED
LYMPHOCYTES # BLD AUTO: 3.5 K/UL (ref 1–4.8)
LYMPHOCYTES NFR BLD: 22.1 % (ref 18–48)
MCH RBC QN AUTO: 26.7 PG (ref 27–31)
MCHC RBC AUTO-ENTMCNC: 30.5 G/DL (ref 32–36)
MCV RBC AUTO: 88 FL (ref 82–98)
MODE: ABNORMAL
MODE: ABNORMAL
MONOCYTES # BLD AUTO: 0.9 K/UL (ref 0.3–1)
MONOCYTES NFR BLD: 5.5 % (ref 4–15)
NEUTROPHILS # BLD AUTO: 10.9 K/UL (ref 1.8–7.7)
NEUTROPHILS NFR BLD: 68.8 % (ref 38–73)
NRBC BLD-RTO: 0 /100 WBC
PCO2 BLDA: 33.1 MMHG (ref 35–45)
PCO2 BLDA: 34.2 MMHG (ref 35–45)
PH SMN: 7.41 [PH] (ref 7.35–7.45)
PH SMN: 7.49 [PH] (ref 7.35–7.45)
PLATELET # BLD AUTO: 573 K/UL (ref 150–450)
PLATELET BLD QL SMEAR: ABNORMAL
PMV BLD AUTO: ABNORMAL FL (ref 9.2–12.9)
PO2 BLDA: 34 MMHG (ref 40–60)
PO2 BLDA: 71 MMHG (ref 80–100)
POC BE: -3 MMOL/L
POC BE: 2 MMOL/L
POC SATURATED O2: 72 % (ref 95–100)
POC SATURATED O2: 94 % (ref 95–100)
POC TCO2: 23 MMOL/L (ref 23–27)
POC TCO2: 26 MMOL/L (ref 24–29)
POTASSIUM SERPL-SCNC: 3.9 MMOL/L (ref 3.5–5.1)
RBC # BLD AUTO: 4.87 M/UL (ref 4–5.4)
RSV AG BY MOLECULAR METHOD: NOT DETECTED
SAMPLE: ABNORMAL
SAMPLE: ABNORMAL
SARS-COV-2 RNA RESP QL NAA+PROBE: NOT DETECTED
SITE: ABNORMAL
SITE: ABNORMAL
SODIUM SERPL-SCNC: 136 MMOL/L (ref 136–145)
TROPONIN I SERPL DL<=0.01 NG/ML-MCNC: <0.006 NG/ML (ref 0–0.03)
WBC # BLD AUTO: 15.86 K/UL (ref 3.9–12.7)

## 2023-01-03 PROCEDURE — 93010 ELECTROCARDIOGRAM REPORT: CPT | Mod: 76,,, | Performed by: INTERNAL MEDICINE

## 2023-01-03 PROCEDURE — 63600175 PHARM REV CODE 636 W HCPCS: Performed by: PHYSICIAN ASSISTANT

## 2023-01-03 PROCEDURE — 25000242 PHARM REV CODE 250 ALT 637 W/ HCPCS: Performed by: EMERGENCY MEDICINE

## 2023-01-03 PROCEDURE — 3079F PR MOST RECENT DIASTOLIC BLOOD PRESSURE 80-89 MM HG: ICD-10-PCS | Mod: CPTII,S$GLB,,

## 2023-01-03 PROCEDURE — 80048 BASIC METABOLIC PNL TOTAL CA: CPT | Performed by: STUDENT IN AN ORGANIZED HEALTH CARE EDUCATION/TRAINING PROGRAM

## 2023-01-03 PROCEDURE — 0241U SARS-COV2 (COVID) WITH FLU/RSV BY PCR: CPT | Performed by: PHYSICIAN ASSISTANT

## 2023-01-03 PROCEDURE — 25000003 PHARM REV CODE 250: Performed by: PHYSICIAN ASSISTANT

## 2023-01-03 PROCEDURE — 99284 EMERGENCY DEPT VISIT MOD MDM: CPT | Mod: CS,,,

## 2023-01-03 PROCEDURE — 99214 PR OFFICE/OUTPT VISIT, EST, LEVL IV, 30-39 MIN: ICD-10-PCS | Mod: S$GLB,,,

## 2023-01-03 PROCEDURE — 36600 WITHDRAWAL OF ARTERIAL BLOOD: CPT

## 2023-01-03 PROCEDURE — 63700000 PHARM REV CODE 250 ALT 637 W/O HCPCS: Performed by: PHYSICIAN ASSISTANT

## 2023-01-03 PROCEDURE — 83880 ASSAY OF NATRIURETIC PEPTIDE: CPT | Performed by: EMERGENCY MEDICINE

## 2023-01-03 PROCEDURE — 99999 PR PBB SHADOW E&M-EST. PATIENT-LVL IV: CPT | Mod: PBBFAC,,,

## 2023-01-03 PROCEDURE — 94640 AIRWAY INHALATION TREATMENT: CPT

## 2023-01-03 PROCEDURE — 85025 COMPLETE CBC W/AUTO DIFF WBC: CPT | Performed by: EMERGENCY MEDICINE

## 2023-01-03 PROCEDURE — G0378 HOSPITAL OBSERVATION PER HR: HCPCS

## 2023-01-03 PROCEDURE — 3008F PR BODY MASS INDEX (BMI) DOCUMENTED: ICD-10-PCS | Mod: CPTII,S$GLB,,

## 2023-01-03 PROCEDURE — 96365 THER/PROPH/DIAG IV INF INIT: CPT

## 2023-01-03 PROCEDURE — 1159F MED LIST DOCD IN RCRD: CPT | Mod: CPTII,S$GLB,,

## 2023-01-03 PROCEDURE — 99900035 HC TECH TIME PER 15 MIN (STAT)

## 2023-01-03 PROCEDURE — 84484 ASSAY OF TROPONIN QUANT: CPT | Performed by: STUDENT IN AN ORGANIZED HEALTH CARE EDUCATION/TRAINING PROGRAM

## 2023-01-03 PROCEDURE — 93005 ELECTROCARDIOGRAM TRACING: CPT

## 2023-01-03 PROCEDURE — 1159F PR MEDICATION LIST DOCUMENTED IN MEDICAL RECORD: ICD-10-PCS | Mod: CPTII,S$GLB,,

## 2023-01-03 PROCEDURE — 99999 PR PBB SHADOW E&M-EST. PATIENT-LVL IV: ICD-10-PCS | Mod: PBBFAC,,,

## 2023-01-03 PROCEDURE — 82803 BLOOD GASES ANY COMBINATION: CPT

## 2023-01-03 PROCEDURE — 1160F PR REVIEW ALL MEDS BY PRESCRIBER/CLIN PHARMACIST DOCUMENTED: ICD-10-PCS | Mod: CPTII,S$GLB,,

## 2023-01-03 PROCEDURE — 25500020 PHARM REV CODE 255: Performed by: STUDENT IN AN ORGANIZED HEALTH CARE EDUCATION/TRAINING PROGRAM

## 2023-01-03 PROCEDURE — 3074F SYST BP LT 130 MM HG: CPT | Mod: CPTII,S$GLB,,

## 2023-01-03 PROCEDURE — 3008F BODY MASS INDEX DOCD: CPT | Mod: CPTII,S$GLB,,

## 2023-01-03 PROCEDURE — 3074F PR MOST RECENT SYSTOLIC BLOOD PRESSURE < 130 MM HG: ICD-10-PCS | Mod: CPTII,S$GLB,,

## 2023-01-03 PROCEDURE — 3079F DIAST BP 80-89 MM HG: CPT | Mod: CPTII,S$GLB,,

## 2023-01-03 PROCEDURE — 99285 EMERGENCY DEPT VISIT HI MDM: CPT | Mod: 25

## 2023-01-03 PROCEDURE — 93010 EKG 12-LEAD: ICD-10-PCS | Mod: 76,,, | Performed by: INTERNAL MEDICINE

## 2023-01-03 PROCEDURE — 1160F RVW MEDS BY RX/DR IN RCRD: CPT | Mod: CPTII,S$GLB,,

## 2023-01-03 PROCEDURE — 93010 ELECTROCARDIOGRAM REPORT: CPT | Mod: ,,, | Performed by: INTERNAL MEDICINE

## 2023-01-03 PROCEDURE — 94761 N-INVAS EAR/PLS OXIMETRY MLT: CPT

## 2023-01-03 PROCEDURE — 99284 PR EMERGENCY DEPT VISIT,LEVEL IV: ICD-10-PCS | Mod: CS,,,

## 2023-01-03 PROCEDURE — 99214 OFFICE O/P EST MOD 30 MIN: CPT | Mod: S$GLB,,,

## 2023-01-03 RX ORDER — IBUPROFEN 200 MG
16 TABLET ORAL
Status: DISCONTINUED | OUTPATIENT
Start: 2023-01-04 | End: 2023-01-07 | Stop reason: HOSPADM

## 2023-01-03 RX ORDER — ENOXAPARIN SODIUM 100 MG/ML
40 INJECTION SUBCUTANEOUS EVERY 24 HOURS
Status: DISCONTINUED | OUTPATIENT
Start: 2023-01-04 | End: 2023-01-07 | Stop reason: HOSPADM

## 2023-01-03 RX ORDER — IPRATROPIUM BROMIDE AND ALBUTEROL SULFATE 2.5; .5 MG/3ML; MG/3ML
3 SOLUTION RESPIRATORY (INHALATION) EVERY 4 HOURS PRN
Status: DISCONTINUED | OUTPATIENT
Start: 2023-01-04 | End: 2023-01-04

## 2023-01-03 RX ORDER — POLYETHYLENE GLYCOL 3350 17 G/17G
17 POWDER, FOR SOLUTION ORAL DAILY PRN
Status: DISCONTINUED | OUTPATIENT
Start: 2023-01-04 | End: 2023-01-07 | Stop reason: HOSPADM

## 2023-01-03 RX ORDER — TALC
6 POWDER (GRAM) TOPICAL NIGHTLY PRN
Status: DISCONTINUED | OUTPATIENT
Start: 2023-01-04 | End: 2023-01-07 | Stop reason: HOSPADM

## 2023-01-03 RX ORDER — BISACODYL 10 MG
10 SUPPOSITORY, RECTAL RECTAL DAILY PRN
Status: DISCONTINUED | OUTPATIENT
Start: 2023-01-04 | End: 2023-01-07 | Stop reason: HOSPADM

## 2023-01-03 RX ORDER — IPRATROPIUM BROMIDE AND ALBUTEROL SULFATE 2.5; .5 MG/3ML; MG/3ML
3 SOLUTION RESPIRATORY (INHALATION)
Status: COMPLETED | OUTPATIENT
Start: 2023-01-03 | End: 2023-01-03

## 2023-01-03 RX ORDER — GLUCAGON 1 MG
1 KIT INJECTION
Status: DISCONTINUED | OUTPATIENT
Start: 2023-01-04 | End: 2023-01-07 | Stop reason: HOSPADM

## 2023-01-03 RX ORDER — ONDANSETRON 8 MG/1
8 TABLET, ORALLY DISINTEGRATING ORAL EVERY 8 HOURS PRN
Status: DISCONTINUED | OUTPATIENT
Start: 2023-01-04 | End: 2023-01-07 | Stop reason: HOSPADM

## 2023-01-03 RX ORDER — AZITHROMYCIN 250 MG/1
500 TABLET, FILM COATED ORAL
Status: COMPLETED | OUTPATIENT
Start: 2023-01-03 | End: 2023-01-03

## 2023-01-03 RX ORDER — PROMETHAZINE HYDROCHLORIDE 12.5 MG/1
25 TABLET ORAL EVERY 6 HOURS PRN
Status: DISCONTINUED | OUTPATIENT
Start: 2023-01-04 | End: 2023-01-07 | Stop reason: HOSPADM

## 2023-01-03 RX ORDER — IBUPROFEN 200 MG
24 TABLET ORAL
Status: DISCONTINUED | OUTPATIENT
Start: 2023-01-04 | End: 2023-01-07 | Stop reason: HOSPADM

## 2023-01-03 RX ORDER — AZITHROMYCIN 250 MG/1
250 TABLET, FILM COATED ORAL DAILY
Status: DISCONTINUED | OUTPATIENT
Start: 2023-01-04 | End: 2023-01-07 | Stop reason: HOSPADM

## 2023-01-03 RX ORDER — SODIUM CHLORIDE 0.9 % (FLUSH) 0.9 %
10 SYRINGE (ML) INJECTION
Status: DISCONTINUED | OUTPATIENT
Start: 2023-01-04 | End: 2023-01-07 | Stop reason: HOSPADM

## 2023-01-03 RX ORDER — ACETAMINOPHEN 325 MG/1
650 TABLET ORAL EVERY 4 HOURS PRN
Status: DISCONTINUED | OUTPATIENT
Start: 2023-01-04 | End: 2023-01-07 | Stop reason: HOSPADM

## 2023-01-03 RX ADMIN — CEFTRIAXONE 1 G: 1 INJECTION, POWDER, FOR SOLUTION INTRAMUSCULAR; INTRAVENOUS at 11:01

## 2023-01-03 RX ADMIN — IPRATROPIUM BROMIDE AND ALBUTEROL SULFATE 3 ML: .5; 3 SOLUTION RESPIRATORY (INHALATION) at 09:01

## 2023-01-03 RX ADMIN — IOHEXOL 75 ML: 350 INJECTION, SOLUTION INTRAVENOUS at 10:01

## 2023-01-03 RX ADMIN — AZITHROMYCIN MONOHYDRATE 500 MG: 250 TABLET ORAL at 11:01

## 2023-01-03 NOTE — PROGRESS NOTES
Subjective:         Chief Complaint: Flank Pain, Shortness of Breath (Symptoms x 1 week), and Cough (Symptoms x 2 weeks)     Marilu Frausto is a 42 y.o. female, patient of Amrik Owen MD unknown to me, presents today with complaints of Flank Pain, Shortness of Breath (Symptoms x 1 week), and Cough (Symptoms x 2 weeks)    Shortness of Breath  This is a recurrent problem. The current episode started 1 to 4 weeks ago. The problem occurs constantly. The problem has been waxing and waning. Associated symptoms include sputum production. Pertinent negatives include no abdominal pain, chest pain, claudication, coryza, ear pain, fever, headaches, leg pain, leg swelling, neck pain, orthopnea, PND, rash, rhinorrhea, sore throat, swollen glands, syncope, vomiting or wheezing. The symptoms are aggravated by any activity. Risk factors: recent hospitalization. She has tried rest and OTC cough suppressants for the symptoms. The treatment provided no relief. Her past medical history is significant for pneumonia.   Cough  This is a new problem. The current episode started 1 to 4 weeks ago. The problem has been waxing and waning. The problem occurs hourly. The cough is Productive of sputum. Associated symptoms include shortness of breath. Pertinent negatives include no chest pain, chills, ear congestion, ear pain, fever, headaches, rash, rhinorrhea, sore throat or wheezing. Nothing aggravates the symptoms. She has tried OTC cough suppressant for the symptoms. The treatment provided no relief. Her past medical history is significant for pneumonia. Influenza +     Patient presents with worsening shortness of breath at home. Recently admitted to hospital for Influenza and hypoxia, symptoms have not improved, and O2 levels dropping to mid 80's with exertion at home. Reached someone with [facility] today and was told to come to clinic for evaluation. Reports chest tenderness due to worsening cough. Denies palpitations, abdominal pain,  "NVD, or leg swelling. Denies birth control use, stopped smoking 1.5 yrs ago, denies recent travel or long trips.     Past Medical History:   Diagnosis Date    Epilepsy     Migraines        Past Surgical History:   Procedure Laterality Date     SECTION         History reviewed. No pertinent family history.    Social History     Socioeconomic History    Marital status:    Tobacco Use    Smoking status: Former     Types: Cigarettes     Quit date: 2021     Years since quittin.0    Smokeless tobacco: Never   Substance and Sexual Activity    Alcohol use: Not Currently       Review of Systems   Constitutional:  Negative for chills and fever.   HENT:  Negative for ear pain, rhinorrhea, sinus pressure and sore throat.    Respiratory:  Positive for cough, sputum production, chest tightness and shortness of breath. Negative for wheezing.    Cardiovascular:  Negative for chest pain, orthopnea, claudication, leg swelling, syncope and PND.   Gastrointestinal:  Negative for abdominal pain and vomiting.   Musculoskeletal:  Negative for neck pain.   Skin:  Negative for rash.   Neurological:  Negative for headaches.       Objective:     Vitals:    23 1511   BP: 118/80   BP Location: Right arm   Patient Position: Sitting   BP Method: Medium (Manual)   Pulse: (!) 114   Temp: 99 °F (37.2 °C)   SpO2: (!) 92%   Weight: 71.6 kg (157 lb 13.6 oz)   Height: 5' 1" (1.549 m)          Physical Exam  Vitals reviewed.   Constitutional:       General: She is not in acute distress.     Appearance: Normal appearance. She is well-developed and well-groomed. She is ill-appearing. She is not toxic-appearing or diaphoretic.   Neck:      Vascular: No carotid bruit.   Cardiovascular:      Rate and Rhythm: Regular rhythm.      Pulses: Normal pulses.      Heart sounds: Normal heart sounds. No murmur heard.    No friction rub. No gallop.   Pulmonary:      Effort: Pulmonary effort is normal. No respiratory distress.      Breath " sounds: Normal breath sounds.   Chest:      Chest wall: Tenderness present.   Abdominal:      General: Bowel sounds are normal. There is no distension.      Palpations: Abdomen is soft.      Tenderness: There is no abdominal tenderness. There is no right CVA tenderness, left CVA tenderness or guarding.   Musculoskeletal:         General: Normal range of motion.      Cervical back: Normal range of motion and neck supple. No rigidity or tenderness.      Right lower leg: Normal. No swelling. No edema.      Left lower leg: Normal. No swelling. No edema.   Lymphadenopathy:      Cervical: No cervical adenopathy.   Skin:     General: Skin is warm and dry.      Capillary Refill: Capillary refill takes 2 to 3 seconds.   Neurological:      General: No focal deficit present.      Mental Status: She is alert and oriented to person, place, and time.   Psychiatric:         Attention and Perception: Attention normal.         Mood and Affect: Mood normal.         Speech: Speech normal.         Behavior: Behavior is cooperative.         Laboratory:  CBC:  Recent Labs   Lab Result Units 12/22/22  2047 12/23/22  0558   WBC K/uL 18.11* 16.86*   RBC M/uL 4.53 4.51   Hemoglobin g/dL 12.3 12.1   Hematocrit % 38.4 38.2   Platelets K/uL 617* 602*   MCV fL 85 85   MCH pg 27.2 26.8*   MCHC g/dL 32.0 31.7*       Assessment:         ICD-10-CM ICD-9-CM   1. Hypoxia  R09.02 799.02   2. Cough, unspecified type  R05.9 786.2   3. Recent URI  J06.9 465.9       Plan:       Hypoxia    Cough, unspecified type    Recent URI    -Physical examination revels patient is Tachycardic ('s) and hypoxic (O2 89-91% at rest).  -Given her HR and oxygen levels and recent history, my recommendation is for patient to go to the nearest emergency room for further evaluation.  -Consulted with Dr. Hua, who is in agreement of the above.  -Patient voiced understanding and in agreement with current treatment plan, patient is driving self to [facility]  Room. Patient denies need of ambulance.      If symptoms worsen, go to ER.  If symptoms do not improve, return to clinic.   Keep appointments with all specialists.   Follow up if symptoms worsen or fail to improve.     Patient's Medications   New Prescriptions    No medications on file   Previous Medications    ALBUTEROL (VENTOLIN HFA) 90 MCG/ACTUATION INHALER    Inhale 2 puffs into the lungs every 6 (six) hours as needed for Wheezing. Rescue    EZETIMIBE (ZETIA) 10 MG TABLET    Take 1 tablet (10 mg total) by mouth once daily.    INNOPRAN  MG CP24    TK 1 C PO QAM    LORATADINE (CLARITIN) 10 MG TABLET    Take 1 tablet (10 mg total) by mouth once daily.    ONDANSETRON (ZOFRAN-ODT) 4 MG TBDL    Take 1 tablet (4 mg total) by mouth every 6 (six) hours as needed (nausea).    TEGRETOL  MG 12 HR TABLET    TK 3 TS PO BID   Modified Medications    No medications on file   Discontinued Medications    AJOVY 225 MG/1.5 ML INJECTION    INJECT 1 SYRINGE INTO THE SKIN ONCE A MONTH    BENZONATATE (TESSALON PERLES) 100 MG CAPSULE    Take 2 capsules (200 mg total) by mouth 3 (three) times daily as needed for Cough.    NICOTINE, POLACRILEX, (NICORETTE) 2 MG GUM    Take 1 each (2 mg total) by mouth as needed (as needed). Can take 1-2 per hour in place of a cigarette.       Janessa Marsh NP

## 2023-01-03 NOTE — Clinical Note
Diagnosis: Pneumonia of both lungs due to infectious organism, unspecified part of lung [[id number]]   Future Attending Provider: SYDNIE MAYES [[id number]]   Admitting Provider:: SYDNIE MAYES [[id number]]

## 2023-01-03 NOTE — FIRST PROVIDER EVALUATION
"Medical screening examination initiated.  I have conducted a focused provider triage encounter, findings are as follows:    Brief history of present illness:  Reports "sick" since 12/19/22 with SOb and getting progressively worse. Reports left sided chest pain with breathing and cough. Productive sputum. D/C'd from [facility] 12/23/22 with home O2 and tamiflu. Pt states did not get her home O2.     Vitals:    01/03/23 1759   BP: 133/76   Pulse: 90   Resp: 20   Temp: 98.1 °F (36.7 °C)   TempSrc: Oral   SpO2: (!) 94%   Weight: 71.2 kg (157 lb)   Height: 5' 1" (1.549 m)       Pertinent physical exam:  Awake, alert. Unlabored breathing.     Brief workup plan:  labs, cxr, o2    Preliminary workup initiated; this workup will be continued and followed by the physician or advanced practice provider that is assigned to the patient when roomed.  "

## 2023-01-04 PROBLEM — D72.829 LEUKOCYTOSIS: Status: ACTIVE | Noted: 2023-01-04

## 2023-01-04 PROBLEM — J18.9 PNEUMONIA: Status: ACTIVE | Noted: 2023-01-04

## 2023-01-04 LAB
ALBUMIN SERPL BCP-MCNC: 3.1 G/DL (ref 3.5–5.2)
ALP SERPL-CCNC: 133 U/L (ref 55–135)
ALT SERPL W/O P-5'-P-CCNC: 17 U/L (ref 10–44)
ANION GAP SERPL CALC-SCNC: 13 MMOL/L (ref 8–16)
AST SERPL-CCNC: 23 U/L (ref 10–40)
BASOPHILS # BLD AUTO: 0.11 K/UL (ref 0–0.2)
BASOPHILS NFR BLD: 0.8 % (ref 0–1.9)
BILIRUB SERPL-MCNC: 0.2 MG/DL (ref 0.1–1)
BNP SERPL-MCNC: 12 PG/ML (ref 0–99)
BUN SERPL-MCNC: 8 MG/DL (ref 6–20)
CALCIUM SERPL-MCNC: 9.1 MG/DL (ref 8.7–10.5)
CHLORIDE SERPL-SCNC: 104 MMOL/L (ref 95–110)
CO2 SERPL-SCNC: 19 MMOL/L (ref 23–29)
CREAT SERPL-MCNC: 0.7 MG/DL (ref 0.5–1.4)
DIFFERENTIAL METHOD: ABNORMAL
EOSINOPHIL # BLD AUTO: 0.3 K/UL (ref 0–0.5)
EOSINOPHIL NFR BLD: 2.3 % (ref 0–8)
ERYTHROCYTE [DISTWIDTH] IN BLOOD BY AUTOMATED COUNT: 14.6 % (ref 11.5–14.5)
EST. GFR  (NO RACE VARIABLE): >60 ML/MIN/1.73 M^2
GLUCOSE SERPL-MCNC: 107 MG/DL (ref 70–110)
HCT VFR BLD AUTO: 37.6 % (ref 37–48.5)
HGB BLD-MCNC: 11.5 G/DL (ref 12–16)
IMM GRANULOCYTES # BLD AUTO: 0.06 K/UL (ref 0–0.04)
IMM GRANULOCYTES NFR BLD AUTO: 0.4 % (ref 0–0.5)
LYMPHOCYTES # BLD AUTO: 3.8 K/UL (ref 1–4.8)
LYMPHOCYTES NFR BLD: 28.1 % (ref 18–48)
MAGNESIUM SERPL-MCNC: 2.1 MG/DL (ref 1.6–2.6)
MCH RBC QN AUTO: 27.1 PG (ref 27–31)
MCHC RBC AUTO-ENTMCNC: 30.6 G/DL (ref 32–36)
MCV RBC AUTO: 89 FL (ref 82–98)
MONOCYTES # BLD AUTO: 0.9 K/UL (ref 0.3–1)
MONOCYTES NFR BLD: 6.3 % (ref 4–15)
NEUTROPHILS # BLD AUTO: 8.5 K/UL (ref 1.8–7.7)
NEUTROPHILS NFR BLD: 62.1 % (ref 38–73)
NRBC BLD-RTO: 0 /100 WBC
PHOSPHATE SERPL-MCNC: 3.8 MG/DL (ref 2.7–4.5)
PLATELET # BLD AUTO: 618 K/UL (ref 150–450)
PMV BLD AUTO: 8.2 FL (ref 9.2–12.9)
POTASSIUM SERPL-SCNC: 3.7 MMOL/L (ref 3.5–5.1)
PROT SERPL-MCNC: 7.6 G/DL (ref 6–8.4)
RBC # BLD AUTO: 4.25 M/UL (ref 4–5.4)
SODIUM SERPL-SCNC: 136 MMOL/L (ref 136–145)
WBC # BLD AUTO: 13.64 K/UL (ref 3.9–12.7)

## 2023-01-04 PROCEDURE — 80053 COMPREHEN METABOLIC PANEL: CPT

## 2023-01-04 PROCEDURE — 63600175 PHARM REV CODE 636 W HCPCS

## 2023-01-04 PROCEDURE — 94761 N-INVAS EAR/PLS OXIMETRY MLT: CPT

## 2023-01-04 PROCEDURE — 99900031 HC PATIENT EDUCATION (STAT)

## 2023-01-04 PROCEDURE — 25000003 PHARM REV CODE 250

## 2023-01-04 PROCEDURE — 87205 SMEAR GRAM STAIN: CPT

## 2023-01-04 PROCEDURE — 99900035 HC TECH TIME PER 15 MIN (STAT)

## 2023-01-04 PROCEDURE — 63700000 PHARM REV CODE 250 ALT 637 W/O HCPCS

## 2023-01-04 PROCEDURE — 94640 AIRWAY INHALATION TREATMENT: CPT

## 2023-01-04 PROCEDURE — 83735 ASSAY OF MAGNESIUM: CPT

## 2023-01-04 PROCEDURE — 85025 COMPLETE CBC W/AUTO DIFF WBC: CPT

## 2023-01-04 PROCEDURE — 20600001 HC STEP DOWN PRIVATE ROOM

## 2023-01-04 PROCEDURE — 84100 ASSAY OF PHOSPHORUS: CPT

## 2023-01-04 PROCEDURE — 99223 1ST HOSP IP/OBS HIGH 75: CPT | Mod: ,,,

## 2023-01-04 PROCEDURE — 25000242 PHARM REV CODE 250 ALT 637 W/ HCPCS

## 2023-01-04 PROCEDURE — 27000221 HC OXYGEN, UP TO 24 HOURS

## 2023-01-04 PROCEDURE — 87070 CULTURE OTHR SPECIMN AEROBIC: CPT

## 2023-01-04 PROCEDURE — 99223 PR INITIAL HOSPITAL CARE,LEVL III: ICD-10-PCS | Mod: ,,,

## 2023-01-04 PROCEDURE — 25000003 PHARM REV CODE 250: Performed by: PHYSICIAN ASSISTANT

## 2023-01-04 RX ORDER — CETIRIZINE HYDROCHLORIDE 10 MG/1
10 TABLET ORAL DAILY
Status: DISCONTINUED | OUTPATIENT
Start: 2023-01-04 | End: 2023-01-07 | Stop reason: HOSPADM

## 2023-01-04 RX ORDER — GUAIFENESIN 600 MG/1
600 TABLET, EXTENDED RELEASE ORAL 2 TIMES DAILY
Status: DISCONTINUED | OUTPATIENT
Start: 2023-01-04 | End: 2023-01-07 | Stop reason: HOSPADM

## 2023-01-04 RX ORDER — CARBAMAZEPINE 200 MG/1
400 TABLET, EXTENDED RELEASE ORAL ONCE
Status: COMPLETED | OUTPATIENT
Start: 2023-01-04 | End: 2023-01-04

## 2023-01-04 RX ORDER — CARBAMAZEPINE 200 MG/1
600 TABLET, EXTENDED RELEASE ORAL 2 TIMES DAILY
Status: DISCONTINUED | OUTPATIENT
Start: 2023-01-05 | End: 2023-01-05

## 2023-01-04 RX ORDER — METHOCARBAMOL 500 MG/1
500 TABLET, FILM COATED ORAL 4 TIMES DAILY
Status: DISCONTINUED | OUTPATIENT
Start: 2023-01-04 | End: 2023-01-06

## 2023-01-04 RX ORDER — CARBAMAZEPINE 200 MG/1
200 TABLET, EXTENDED RELEASE ORAL 2 TIMES DAILY
Status: DISCONTINUED | OUTPATIENT
Start: 2023-01-04 | End: 2023-01-04

## 2023-01-04 RX ORDER — PROPRANOLOL HYDROCHLORIDE 40 MG/1
120 TABLET ORAL DAILY
Status: DISCONTINUED | OUTPATIENT
Start: 2023-01-04 | End: 2023-01-07 | Stop reason: HOSPADM

## 2023-01-04 RX ORDER — IPRATROPIUM BROMIDE AND ALBUTEROL SULFATE 2.5; .5 MG/3ML; MG/3ML
3 SOLUTION RESPIRATORY (INHALATION) EVERY 4 HOURS
Status: DISCONTINUED | OUTPATIENT
Start: 2023-01-04 | End: 2023-01-05

## 2023-01-04 RX ORDER — CARBAMAZEPINE 200 MG/1
600 TABLET, EXTENDED RELEASE ORAL 2 TIMES DAILY
Status: DISCONTINUED | OUTPATIENT
Start: 2023-01-04 | End: 2023-01-04

## 2023-01-04 RX ADMIN — METHOCARBAMOL 500 MG: 500 TABLET ORAL at 05:01

## 2023-01-04 RX ADMIN — GUAIFENESIN 600 MG: 600 TABLET, EXTENDED RELEASE ORAL at 10:01

## 2023-01-04 RX ADMIN — METHOCARBAMOL 500 MG: 500 TABLET ORAL at 08:01

## 2023-01-04 RX ADMIN — AZITHROMYCIN MONOHYDRATE 250 MG: 250 TABLET ORAL at 08:01

## 2023-01-04 RX ADMIN — IPRATROPIUM BROMIDE AND ALBUTEROL SULFATE 3 ML: 2.5; .5 SOLUTION RESPIRATORY (INHALATION) at 07:01

## 2023-01-04 RX ADMIN — ACETAMINOPHEN 650 MG: 325 TABLET ORAL at 02:01

## 2023-01-04 RX ADMIN — ONDANSETRON 8 MG: 8 TABLET, ORALLY DISINTEGRATING ORAL at 01:01

## 2023-01-04 RX ADMIN — GUAIFENESIN 600 MG: 600 TABLET, EXTENDED RELEASE ORAL at 08:01

## 2023-01-04 RX ADMIN — ENOXAPARIN SODIUM 40 MG: 40 INJECTION SUBCUTANEOUS at 05:01

## 2023-01-04 RX ADMIN — IPRATROPIUM BROMIDE AND ALBUTEROL SULFATE 3 ML: 2.5; .5 SOLUTION RESPIRATORY (INHALATION) at 03:01

## 2023-01-04 RX ADMIN — CARBAMAZEPINE 200 MG: 200 TABLET, EXTENDED RELEASE ORAL at 10:01

## 2023-01-04 RX ADMIN — CEFTRIAXONE 1 G: 1 INJECTION, POWDER, FOR SOLUTION INTRAMUSCULAR; INTRAVENOUS at 09:01

## 2023-01-04 RX ADMIN — METHOCARBAMOL 500 MG: 500 TABLET ORAL at 01:01

## 2023-01-04 RX ADMIN — CETIRIZINE HYDROCHLORIDE 10 MG: 10 TABLET, FILM COATED ORAL at 08:01

## 2023-01-04 RX ADMIN — CARBAMAZEPINE 400 MG: 200 TABLET, EXTENDED RELEASE ORAL at 11:01

## 2023-01-04 RX ADMIN — METHOCARBAMOL 500 MG: 500 TABLET ORAL at 10:01

## 2023-01-04 NOTE — SUBJECTIVE & OBJECTIVE
Past Medical History:   Diagnosis Date    Epilepsy     Migraines        Past Surgical History:   Procedure Laterality Date     SECTION         Review of patient's allergies indicates:  No Known Allergies    No current facility-administered medications on file prior to encounter.     Current Outpatient Medications on File Prior to Encounter   Medication Sig    albuterol (VENTOLIN HFA) 90 mcg/actuation inhaler Inhale 2 puffs into the lungs every 6 (six) hours as needed for Wheezing. Rescue (Patient not taking: Reported on 1/3/2023)    ezetimibe (ZETIA) 10 mg tablet Take 1 tablet (10 mg total) by mouth once daily. (Patient not taking: Reported on 2022)    INNOPRAN  mg Cp24 TK 1 C PO QAM    loratadine (CLARITIN) 10 mg tablet Take 1 tablet (10 mg total) by mouth once daily.    ondansetron (ZOFRAN-ODT) 4 MG TbDL Take 1 tablet (4 mg total) by mouth every 6 (six) hours as needed (nausea). (Patient not taking: Reported on 2022)    TEGRETOL  mg 12 hr tablet TK 3 TS PO BID     Family History    None       Tobacco Use    Smoking status: Former     Types: Cigarettes     Quit date: 2021     Years since quittin.0    Smokeless tobacco: Never   Substance and Sexual Activity    Alcohol use: Not Currently    Drug use: Not on file    Sexual activity: Not on file     Review of Systems   Constitutional:  Negative for activity change, chills and fever.   HENT:  Positive for congestion. Negative for trouble swallowing.    Eyes:  Negative for photophobia and visual disturbance.   Respiratory:  Positive for cough and shortness of breath. Negative for chest tightness.    Cardiovascular:  Positive for chest pain (2/2 cough). Negative for palpitations and leg swelling.   Gastrointestinal:  Negative for abdominal pain, constipation, diarrhea, nausea and vomiting.   Genitourinary:  Negative for dysuria, frequency, hematuria and vaginal bleeding.   Musculoskeletal:  Negative for back pain, gait problem and  neck pain.   Skin:  Negative for rash and wound.   Neurological:  Negative for dizziness, syncope, speech difficulty and light-headedness.   Psychiatric/Behavioral:  Negative for agitation and confusion. The patient is not nervous/anxious.    Objective:     Vital Signs (Most Recent):  Temp: 98.3 °F (36.8 °C) (01/03/23 2329)  Pulse: 82 (01/03/23 2329)  Resp: 18 (01/03/23 2329)  BP: 116/67 (01/03/23 2329)  SpO2: 95 % (01/03/23 2329) Vital Signs (24h Range):  Temp:  [98.1 °F (36.7 °C)-99 °F (37.2 °C)] 98.3 °F (36.8 °C)  Pulse:  [] 82  Resp:  [18-20] 18  SpO2:  [92 %-96 %] 95 %  BP: (116-133)/(67-90) 116/67     Weight: 71.2 kg (157 lb)  Body mass index is 29.66 kg/m².    Physical Exam  Vitals and nursing note reviewed.   Constitutional:       General: She is not in acute distress.     Appearance: She is well-developed.   HENT:      Head: Normocephalic and atraumatic.      Nose: No congestion.      Mouth/Throat:      Pharynx: No oropharyngeal exudate.   Eyes:      Conjunctiva/sclera: Conjunctivae normal.      Pupils: Pupils are equal, round, and reactive to light.   Cardiovascular:      Rate and Rhythm: Normal rate and regular rhythm.      Heart sounds: Normal heart sounds.   Pulmonary:      Effort: Pulmonary effort is normal. No respiratory distress.      Breath sounds: Rhonchi (bilteral lower lung fields) present. No wheezing.   Abdominal:      General: Bowel sounds are normal. There is no distension.      Palpations: Abdomen is soft.      Tenderness: There is no abdominal tenderness.   Musculoskeletal:         General: No tenderness. Normal range of motion.      Cervical back: Normal range of motion and neck supple.   Lymphadenopathy:      Cervical: No cervical adenopathy.   Skin:     General: Skin is warm and dry.      Capillary Refill: Capillary refill takes less than 2 seconds.      Findings: No rash.   Neurological:      Mental Status: She is alert and oriented to person, place, and time.      Cranial  Nerves: No cranial nerve deficit.      Sensory: No sensory deficit.      Coordination: Coordination normal.   Psychiatric:         Behavior: Behavior normal.         Thought Content: Thought content normal.         Judgment: Judgment normal.         CRANIAL NERVES     CN III, IV, VI   Pupils are equal, round, and reactive to light.     Significant Labs: All pertinent labs within the past 24 hours have been reviewed.  BMP:   Recent Labs   Lab 01/03/23  2220         K 3.9      CO2 23   BUN 9   CREATININE 0.7   CALCIUM 9.6     CBC:   Recent Labs   Lab 01/03/23  2103   WBC 15.86*   HGB 13.0   HCT 42.6   *       Significant Imaging: I have reviewed all pertinent imaging results/findings within the past 24 hours.    Imaging Results              CTA Chest Non-Coronary (PE Studies) (Final result)  Result time 01/03/23 23:12:50      Final result by Caden Gee MD (01/03/23 23:12:50)                   Impression:      No evidence of acute pulmonary embolus to the proximal segmental level.    Findings suggestive of bronchopneumonia or other infectious/inflammatory process most pronounced in the bilateral lower lobes as described in the body of the report.      Electronically signed by: Caden Gee MD  Date:    01/03/2023  Time:    23:12               Narrative:    EXAMINATION:  CTA CHEST NON CORONARY (PE STUDIES)    CLINICAL HISTORY:  Pulmonary embolism (PE) suspected, high prob;    TECHNIQUE:  Low dose axial images, sagittal and coronal reformations were obtained from the thoracic inlet to the lung bases following the IV administration of 75 mL of Omnipaque 350.  Contrast timing was optimized to evaluate the pulmonary arteries.  MIP images were performed.    COMPARISON:  Chest radiograph, 01/03/2023 and 12/22/2022.    FINDINGS:  Examination of the soft tissue and vascular structures at the base of the neck is negative for acute finding.  There is a 1 cm hypodensity in the left lobe of the  thyroid, too small for dedicated follow-up.    The thoracic aorta maintains normal caliber, contour, and course without significant atherosclerotic calcification.  There is no evidence of aneurysmal dilation or dissection.    The pulmonary arteries distribute normally without evidence of filling defect to indicate pulmonary thromboembolism.    The trachea and proximal airways are patent.    The lungs are symmetrically expanded and there is diffuse peribronchial wall thickening with prominent interstitial and tree-in-bud opacities throughout the inferior aspect of the bilateral upper lobes, lingula, right middle lobe, and bilateral lower lobes.  Few smaller areas of patchy airspace disease in the bilateral inferior lower lobes and inferior aspect of the right middle lobe.  Mucous plugging versus retained secretions in the bilateral lower lobes and right middle lobe.  No evidence of pleural fluid.  No pneumothorax.    The heart is not enlarged.  No pericardial effusion.    There is no axillary, mediastinal, or hilar lymph node enlargement.    The esophagus maintains a normal course and caliber.    Limited images of the upper abdomen obtained during the course of this dedicated thoracic CT is negative for acute findings.    The osseous structures are negative for acute finding or aggressive osseous lesion.  Incidentally noted vacuum phenomena in the bilateral shoulder joints.                                       X-Ray Chest PA And Lateral (Final result)  Result time 01/03/23 22:07:27      Final result by Aurelio Lux MD (01/03/23 22:07:27)                   Impression:      Suspected mild bibasilar infiltrate as above.      Electronically signed by: Aurelio Lux  Date:    01/03/2023  Time:    22:07               Narrative:    EXAMINATION:  XR CHEST PA AND LATERAL    CLINICAL HISTORY:  Cough, unspecified    TECHNIQUE:  PA and lateral views of the chest were performed.    COMPARISON:  Chest radiograph December  22, 2022    FINDINGS:  Two views of the chest are submitted.  The appearance of the cardiomediastinal silhouette is appropriate.  Mild accentuated pulmonary opacity the lung bases may relate to mild basilar infiltrate without dense consolidation.  There is no evidence for pleural effusion or pneumothorax, the osseous structures appear intact.

## 2023-01-04 NOTE — PROVIDER PROGRESS NOTES - EMERGENCY DEPT.
Encounter Date: 1/3/2023    ED Physician Progress Notes          Patient signed out to me by my colleague with instructions to follow-up pending work-up. Please see main ED note for previous ED stay documentation.     CTA chest showing BL lower lobe bronchopneumonia findings. No evidence of PE. Discussed with HM given her hypoxia and will place in observation for further management. Rocephin and Azithromycin ordered for coverage. Patient expresses understanding and agreeable to the plan.     ED Diagnosis:  Final diagnoses:  [R06.02] SOB (shortness of breath)  [R05.9] Cough  [R09.02] Hypoxia  [J18.9] Pneumonia of both lungs due to infectious organism, unspecified part of lung (Primary)    ED Disposition:   ED Disposition Condition    Observation

## 2023-01-04 NOTE — ED TRIAGE NOTES
Marilu Frausto, a 42 y.o. female presents to the ED w/ complaint of SOB and L sided pain when breathing since 12/19/22. Reports being dx with pneumonia at [facility] and no improvement     Triage note:  Chief Complaint   Patient presents with    Shortness of Breath     Pt reports feeling ill since 12/19/23. Reports being dx'd w/ pneumonia last week and no improvement of symptoms. Denies fevers, chills, chest pain, N/V, or myalgias      Review of patient's allergies indicates:  No Known Allergies  Past Medical History:   Diagnosis Date    Epilepsy     Migraines

## 2023-01-04 NOTE — ASSESSMENT & PLAN NOTE
Patient presents to the ED for worsening SOB with a dry cough since 12/19. Has received treatment for bronchitis and the flu without relief.   Leukocytosis  - Afebrile, WBC 15K  - 95% on 2L, wean as tolerated  - CTA showed no evidence of acute pulmonary embolus to the proximal segmental level. Findings suggestive of bronchopneumonia or other infectious/inflammatory process most pronounced in the bilateral lower lobes as described in the body of the report.  - continue azirtho/rocephin x 5 days  - Check respiratory cultures  - Duonebs q4h while awake and prn with IS  - Guaifenesin, Codeine cough syrup, and Tessalon Perles prn cough

## 2023-01-04 NOTE — H&P
[facility] - Emergency Dept  [facility] Medicine  History & Physical    Patient Name: Marilu Frausto  MRN: [id number]  Patient Class: OP- Observation  Admission Date: 1/3/2023  Attending Physician: Leslye Jiang MD   Primary Care Provider: Amrik Owen MD         Patient information was obtained from patient and ER records.     Subjective:     Principal Problem:<principal problem not specified>    Chief Complaint:   Chief Complaint   Patient presents with    Shortness of Breath     Pt reports feeling ill since 12/19/23. Reports being dx'd w/ pneumonia last week and no improvement of symptoms. Denies fevers, chills, chest pain, N/V, or myalgias         HPI: Marilu Frausto is a 42 y.o. female with a hx of seizures and migraines presents to the ED for increased shortness of breath since 12/19. Patient states she was diagnosed with bronchitis on 12/19 and was sent home with steroids which provided no relief. She was then seen on the 22th and was found to have the flu with associated hypoxia. Finished course of tamiflu without relief of symptoms.Upon discharge from that hospital stay patient endorsed O2 sats at home remained around 90% and felt that she was SOB with only a minor amount of exertion. States she is unable to complete simple activites at home with SOB. Patient also endorses having to sleep upright on couch because she is unable to breath while laying flat. Endorses left sided chest pain with coughing, dry cough and subjective fevers at home. Denies abdominal pain, constipation/ diarrhea and urinary symptoms.     ED: AF, VSS. WBC 15K. CMP unremarkable. Tn neg. COVID/ flu neg. EKG NSR w/o ischemic changes. CXR showed suspected mild bibasilar. CTA Chest showed no evidence of acute pulmonary embolus to the proximal segmental level. Findings suggestive of bronchopneumonia or other infectious/inflammatory process most pronounced in the bilateral lower lobes as described in the body of the report. Give duo  nebs, azithromycin and rocephin in the ED.      Past Medical History:   Diagnosis Date    Epilepsy     Migraines        Past Surgical History:   Procedure Laterality Date     SECTION         Review of patient's allergies indicates:  No Known Allergies    No current facility-administered medications on file prior to encounter.     Current Outpatient Medications on File Prior to Encounter   Medication Sig    albuterol (VENTOLIN HFA) 90 mcg/actuation inhaler Inhale 2 puffs into the lungs every 6 (six) hours as needed for Wheezing. Rescue (Patient not taking: Reported on 1/3/2023)    ezetimibe (ZETIA) 10 mg tablet Take 1 tablet (10 mg total) by mouth once daily. (Patient not taking: Reported on 2022)    INNOPRAN  mg Cp24 TK 1 C PO QAM    loratadine (CLARITIN) 10 mg tablet Take 1 tablet (10 mg total) by mouth once daily.    ondansetron (ZOFRAN-ODT) 4 MG TbDL Take 1 tablet (4 mg total) by mouth every 6 (six) hours as needed (nausea). (Patient not taking: Reported on 2022)    TEGRETOL  mg 12 hr tablet TK 3 TS PO BID     Family History    None       Tobacco Use    Smoking status: Former     Types: Cigarettes     Quit date: 2021     Years since quittin.0    Smokeless tobacco: Never   Substance and Sexual Activity    Alcohol use: Not Currently    Drug use: Not on file    Sexual activity: Not on file     Review of Systems   Constitutional:  Negative for activity change, chills and fever.   HENT:  Positive for congestion. Negative for trouble swallowing.    Eyes:  Negative for photophobia and visual disturbance.   Respiratory:  Positive for cough and shortness of breath. Negative for chest tightness.    Cardiovascular:  Positive for chest pain (2/2 cough). Negative for palpitations and leg swelling.   Gastrointestinal:  Negative for abdominal pain, constipation, diarrhea, nausea and vomiting.   Genitourinary:  Negative for dysuria, frequency, hematuria and vaginal  bleeding.   Musculoskeletal:  Negative for back pain, gait problem and neck pain.   Skin:  Negative for rash and wound.   Neurological:  Negative for dizziness, syncope, speech difficulty and light-headedness.   Psychiatric/Behavioral:  Negative for agitation and confusion. The patient is not nervous/anxious.    Objective:     Vital Signs (Most Recent):  Temp: 98.3 °F (36.8 °C) (01/03/23 2329)  Pulse: 82 (01/03/23 2329)  Resp: 18 (01/03/23 2329)  BP: 116/67 (01/03/23 2329)  SpO2: 95 % (01/03/23 2329) Vital Signs (24h Range):  Temp:  [98.1 °F (36.7 °C)-99 °F (37.2 °C)] 98.3 °F (36.8 °C)  Pulse:  [] 82  Resp:  [18-20] 18  SpO2:  [92 %-96 %] 95 %  BP: (116-133)/(67-90) 116/67     Weight: 71.2 kg (157 lb)  Body mass index is 29.66 kg/m².    Physical Exam  Vitals and nursing note reviewed.   Constitutional:       General: She is not in acute distress.     Appearance: She is well-developed.   HENT:      Head: Normocephalic and atraumatic.      Nose: No congestion.      Mouth/Throat:      Pharynx: No oropharyngeal exudate.   Eyes:      Conjunctiva/sclera: Conjunctivae normal.      Pupils: Pupils are equal, round, and reactive to light.   Cardiovascular:      Rate and Rhythm: Normal rate and regular rhythm.      Heart sounds: Normal heart sounds.   Pulmonary:      Effort: Pulmonary effort is normal. No respiratory distress.      Breath sounds: Rhonchi (bilteral lower lung fields) present. No wheezing.   Abdominal:      General: Bowel sounds are normal. There is no distension.      Palpations: Abdomen is soft.      Tenderness: There is no abdominal tenderness.   Musculoskeletal:         General: No tenderness. Normal range of motion.      Cervical back: Normal range of motion and neck supple.   Lymphadenopathy:      Cervical: No cervical adenopathy.   Skin:     General: Skin is warm and dry.      Capillary Refill: Capillary refill takes less than 2 seconds.      Findings: No rash.   Neurological:      Mental Status:  She is alert and oriented to person, place, and time.      Cranial Nerves: No cranial nerve deficit.      Sensory: No sensory deficit.      Coordination: Coordination normal.   Psychiatric:         Behavior: Behavior normal.         Thought Content: Thought content normal.         Judgment: Judgment normal.         CRANIAL NERVES     CN III, IV, VI   Pupils are equal, round, and reactive to light.     Significant Labs: All pertinent labs within the past 24 hours have been reviewed.  BMP:   Recent Labs   Lab 01/03/23  2220         K 3.9      CO2 23   BUN 9   CREATININE 0.7   CALCIUM 9.6     CBC:   Recent Labs   Lab 01/03/23  2103   WBC 15.86*   HGB 13.0   HCT 42.6   *       Significant Imaging: I have reviewed all pertinent imaging results/findings within the past 24 hours.    Imaging Results              CTA Chest Non-Coronary (PE Studies) (Final result)  Result time 01/03/23 23:12:50      Final result by Caden Gee MD (01/03/23 23:12:50)                   Impression:      No evidence of acute pulmonary embolus to the proximal segmental level.    Findings suggestive of bronchopneumonia or other infectious/inflammatory process most pronounced in the bilateral lower lobes as described in the body of the report.      Electronically signed by: Caden Gee MD  Date:    01/03/2023  Time:    23:12               Narrative:    EXAMINATION:  CTA CHEST NON CORONARY (PE STUDIES)    CLINICAL HISTORY:  Pulmonary embolism (PE) suspected, high prob;    TECHNIQUE:  Low dose axial images, sagittal and coronal reformations were obtained from the thoracic inlet to the lung bases following the IV administration of 75 mL of Omnipaque 350.  Contrast timing was optimized to evaluate the pulmonary arteries.  MIP images were performed.    COMPARISON:  Chest radiograph, 01/03/2023 and 12/22/2022.    FINDINGS:  Examination of the soft tissue and vascular structures at the base of the neck is negative for  acute finding.  There is a 1 cm hypodensity in the left lobe of the thyroid, too small for dedicated follow-up.    The thoracic aorta maintains normal caliber, contour, and course without significant atherosclerotic calcification.  There is no evidence of aneurysmal dilation or dissection.    The pulmonary arteries distribute normally without evidence of filling defect to indicate pulmonary thromboembolism.    The trachea and proximal airways are patent.    The lungs are symmetrically expanded and there is diffuse peribronchial wall thickening with prominent interstitial and tree-in-bud opacities throughout the inferior aspect of the bilateral upper lobes, lingula, right middle lobe, and bilateral lower lobes.  Few smaller areas of patchy airspace disease in the bilateral inferior lower lobes and inferior aspect of the right middle lobe.  Mucous plugging versus retained secretions in the bilateral lower lobes and right middle lobe.  No evidence of pleural fluid.  No pneumothorax.    The heart is not enlarged.  No pericardial effusion.    There is no axillary, mediastinal, or hilar lymph node enlargement.    The esophagus maintains a normal course and caliber.    Limited images of the upper abdomen obtained during the course of this dedicated thoracic CT is negative for acute findings.    The osseous structures are negative for acute finding or aggressive osseous lesion.  Incidentally noted vacuum phenomena in the bilateral shoulder joints.                                       X-Ray Chest PA And Lateral (Final result)  Result time 01/03/23 22:07:27      Final result by Aurelio Lux MD (01/03/23 22:07:27)                   Impression:      Suspected mild bibasilar infiltrate as above.      Electronically signed by: Aurelio Lux  Date:    01/03/2023  Time:    22:07               Narrative:    EXAMINATION:  XR CHEST PA AND LATERAL    CLINICAL HISTORY:  Cough, unspecified    TECHNIQUE:  PA and lateral views of  the chest were performed.    COMPARISON:  Chest radiograph December 22, 2022    FINDINGS:  Two views of the chest are submitted.  The appearance of the cardiomediastinal silhouette is appropriate.  Mild accentuated pulmonary opacity the lung bases may relate to mild basilar infiltrate without dense consolidation.  There is no evidence for pleural effusion or pneumothorax, the osseous structures appear intact.                                        Assessment/Plan:     Pneumonia  Patient presents to the ED for worsening SOB with a dry cough since 12/19. Has received treatment for bronchitis and the flu without relief.   Leukocytosis  - Afebrile, WBC 15K  - 95% on 2L, wean as tolerated  - CTA showed no evidence of acute pulmonary embolus to the proximal segmental level. Findings suggestive of bronchopneumonia or other infectious/inflammatory process most pronounced in the bilateral lower lobes as described in the body of the report.  - continue azirtho/rocephin x 5 days  - Check respiratory cultures  - Duonebs q4h while awake and prn with IS  - Guaifenesin, Codeine cough syrup, and Tessalon Perles prn cough    Epilepsy  - continue home tegretol    Migraine without status migrainosus, not intractable  - continue home propanolol       VTE Risk Mitigation (From admission, onward)         Ordered     enoxaparin injection 40 mg  Daily         01/03/23 2350     IP VTE HIGH RISK PATIENT  Once         01/03/23 2350                   Liliana Adler PA-C  Department of Hospital Medicine   [facility] - Emergency Dept

## 2023-01-04 NOTE — CARE UPDATE
Care Update    Patient seen and examined at bedside. Endorses improving cough and SOB, although still requiring oxygen. Currently 2L NC. Continuing breathing treatments and IV Azithro/Roceph. CBC with leukocytosis down trending. CMP wnl. No further complaints on exam.     Physical Exam  - Cardio - normal heart sounds, no m/r/g  - Pulm - LCTAB, no w/r/r    Plan  - will continue treatment of pneumonia with azithromycin and rocephin  - sputum cultures pending collection - patient's cough less productive today  - breathing treatments q4h    Martín Red PA-C  [facility]  Department of Hospital Medicine

## 2023-01-04 NOTE — ED PROVIDER NOTES
Encounter Date: 1/3/2023       History     Chief Complaint   Patient presents with    Shortness of Breath     Pt reports feeling ill since 23. Reports being dx'd w/ pneumonia last week and no improvement of symptoms. Denies fevers, chills, chest pain, N/V, or myalgias      43 y/o female presents to the emergency department with concerns of shortness of breath and low pulse ox in the 80s at home that has been ongoing over the last month. Patient tested positive for  influenza on  and was admitted to [facility] with hypoxia. Patient states when discharged from the hospital her pulse ox remained at 90% at home and she is unable to perform her normal daily duties without experiencing dyspnea. She was seen at urgent care today where she was tachycardic at 110, with ambulatory pulse ox of 88-90%.  Patient reports she stopped smoking a year ago does not take estrogen pills and denies long travel.  She also states the fever and myalgias have resolved since December. She denies fever, hemoptysis, chest pain abdominal, diarrhea or dysuria.    Review of patient's allergies indicates:  No Known Allergies  Past Medical History:   Diagnosis Date    Epilepsy     Migraines      Past Surgical History:   Procedure Laterality Date     SECTION       No family history on file.  Social History     Tobacco Use    Smoking status: Former     Types: Cigarettes     Quit date: 2021     Years since quittin.0    Smokeless tobacco: Never   Substance Use Topics    Alcohol use: Not Currently     Review of Systems   Constitutional:  Negative for activity change and fever.   HENT:  Positive for congestion. Negative for sore throat.    Respiratory:  Positive for shortness of breath. Negative for chest tightness and wheezing.    Cardiovascular:  Negative for chest pain and leg swelling.   Gastrointestinal:  Negative for abdominal pain, diarrhea, nausea and vomiting.   Genitourinary:  Negative for dysuria, flank pain and  hematuria.   Musculoskeletal:  Negative for back pain and myalgias.   Skin:  Negative for color change.   Allergic/Immunologic: Negative for immunocompromised state.   Neurological:  Negative for headaches.     Physical Exam     Initial Vitals [01/03/23 1759]   BP Pulse Resp Temp SpO2   133/76 90 20 98.1 °F (36.7 °C) (!) 94 %      MAP       --         Physical Exam    Nursing note and vitals reviewed.  Constitutional: She appears well-developed and well-nourished.   HENT:   Head: Normocephalic and atraumatic.   Mouth/Throat: Uvula is midline and oropharynx is clear and moist.   Eyes: Conjunctivae and EOM are normal.   Neck:   Normal range of motion.  Cardiovascular:  Normal rate, regular rhythm and normal heart sounds.           Pulmonary/Chest: No respiratory distress. She has no wheezes. She has rales (LLL, RLL).   Abdominal: Abdomen is soft. She exhibits no distension. There is no abdominal tenderness. There is no rebound.   Musculoskeletal:         General: No edema. Normal range of motion.      Cervical back: Normal range of motion.      Comments: Lower extremities symmetric     Neurological: She is alert and oriented to person, place, and time.   Skin: Skin is warm and dry. No erythema. No pallor.   Psychiatric: She has a normal mood and affect. Thought content normal.       ED Course   Procedures  Labs Reviewed   CBC W/ AUTO DIFFERENTIAL - Abnormal; Notable for the following components:       Result Value    WBC 15.86 (*)     MCH 26.7 (*)     MCHC 30.5 (*)     Platelets 573 (*)     Immature Granulocytes 0.9 (*)     Gran # (ANC) 10.9 (*)     Immature Grans (Abs) 0.14 (*)     Platelet Estimate Clumped (*)     All other components within normal limits    Narrative:     Release to patient->Immediate   ISTAT PROCEDURE - Abnormal; Notable for the following components:    POC PH 7.489 (*)     POC PCO2 33.1 (*)     POC PO2 34 (*)     POC SATURATED O2 72 (*)     All other components within normal limits   ISTAT  PROCEDURE - Abnormal; Notable for the following components:    POC PCO2 34.2 (*)     POC PO2 71 (*)     POC HCO3 21.9 (*)     POC SATURATED O2 94 (*)     All other components within normal limits   B-TYPE NATRIURETIC PEPTIDE   TROPONIN I   BASIC METABOLIC PANEL          Imaging Results    None          Medications   albuterol-ipratropium 2.5 mg-0.5 mg/3 mL nebulizer solution 3 mL (3 mLs Nebulization Given 1/3/23 2135)     Medical Decision Making:   Initial Assessment:   42-year-old female with complaints of shortness of breath with hypoxia, after testing positive for influenza in December  Differential Diagnosis:   Different diagnosis includes but is not limited to:  Bronchitis, pneumonia acute heart failure, Pulmonary embolism  Clinical Tests:   Lab Tests: Ordered and Reviewed  Radiological Study: Ordered and Reviewed  ED Management:  ED workup reveals:   On CBC there is still leukocytosis 15.86, this is ongoing from lab work in December.  Platelets also elevated 573  CMP pending at this time along with CXR  On ABG patient's pH of 7.41, w/ bicarb of 21.9 with CO2 of 34.2, pulse ox 93 % - pt given 1 breathing treatment and placed on 2Ls of O2   EKG:   per my interpretation   Rate/Rhythm/Axis: 82 bpm, regular rhythm, normal axis  QRS 76 ms  Qtc 418 ms  Impression: NS 82bpm There is no concern of STEMI or signs of ischemia at this time      CMP, CXR and CTA pending with trop and BNP   Disposition will likely be inpatient admission has ongoing hypoxia   Case discussed with supervising physician                         Clinical Impression:   Final diagnoses:  [R06.02] SOB (shortness of breath)  [R05.9] Cough  [R09.02] Hypoxia               Georgina Alfaro PA-C  01/04/23 8543

## 2023-01-04 NOTE — HPI
Marilu Frausto is a 42 y.o. female with a hx of seizures and migraines presents to the ED for increased shortness of breath since 12/19. Patient states she was diagnosed with bronchitis on 12/19 and was sent home with steroids which provided no relief. She was then seen on the 22th and was found to have the flu with associated hypoxia. Finished course of tamiflu without relief of symptoms.Upon discharge from that hospital stay patient endorsed O2 sats at home remained around 90% and felt that she was SOB with only a minor amount of exertion. States she is unable to complete simple activites at home with SOB. Patient also endorses having to sleep upright on couch because she is unable to breath while laying flat. Endorses left sided chest pain with coughing, dry cough and subjective fevers at home. Denies abdominal pain, constipation/ diarrhea and urinary symptoms.     ED: AF, VSS. WBC 15K. CMP unremarkable. Tn neg. COVID/ flu neg. EKG NSR w/o ischemic changes. CXR showed suspected mild bibasilar. CTA Chest showed no evidence of acute pulmonary embolus to the proximal segmental level. Findings suggestive of bronchopneumonia or other infectious/inflammatory process most pronounced in the bilateral lower lobes as described in the body of the report. Give duo nebs, azithromycin and rocephin in the ED.

## 2023-01-05 PROBLEM — I95.9 HYPOTENSION: Status: ACTIVE | Noted: 2023-01-05

## 2023-01-05 LAB
ALBUMIN SERPL BCP-MCNC: 3 G/DL (ref 3.5–5.2)
ALP SERPL-CCNC: 140 U/L (ref 55–135)
ALT SERPL W/O P-5'-P-CCNC: 21 U/L (ref 10–44)
ANION GAP SERPL CALC-SCNC: 10 MMOL/L (ref 8–16)
AST SERPL-CCNC: 19 U/L (ref 10–40)
BASOPHILS # BLD AUTO: 0.09 K/UL (ref 0–0.2)
BASOPHILS NFR BLD: 0.9 % (ref 0–1.9)
BILIRUB SERPL-MCNC: 0.1 MG/DL (ref 0.1–1)
BUN SERPL-MCNC: 9 MG/DL (ref 6–20)
CALCIUM SERPL-MCNC: 9.2 MG/DL (ref 8.7–10.5)
CHLORIDE SERPL-SCNC: 106 MMOL/L (ref 95–110)
CO2 SERPL-SCNC: 21 MMOL/L (ref 23–29)
CREAT SERPL-MCNC: 0.7 MG/DL (ref 0.5–1.4)
DIFFERENTIAL METHOD: ABNORMAL
EOSINOPHIL # BLD AUTO: 0.3 K/UL (ref 0–0.5)
EOSINOPHIL NFR BLD: 2.6 % (ref 0–8)
ERYTHROCYTE [DISTWIDTH] IN BLOOD BY AUTOMATED COUNT: 14.5 % (ref 11.5–14.5)
EST. GFR  (NO RACE VARIABLE): >60 ML/MIN/1.73 M^2
GLUCOSE SERPL-MCNC: 93 MG/DL (ref 70–110)
HCT VFR BLD AUTO: 36.6 % (ref 37–48.5)
HGB BLD-MCNC: 11.3 G/DL (ref 12–16)
IMM GRANULOCYTES # BLD AUTO: 0.03 K/UL (ref 0–0.04)
IMM GRANULOCYTES NFR BLD AUTO: 0.3 % (ref 0–0.5)
LYMPHOCYTES # BLD AUTO: 3.2 K/UL (ref 1–4.8)
LYMPHOCYTES NFR BLD: 30.9 % (ref 18–48)
MAGNESIUM SERPL-MCNC: 2.1 MG/DL (ref 1.6–2.6)
MCH RBC QN AUTO: 27.2 PG (ref 27–31)
MCHC RBC AUTO-ENTMCNC: 30.9 G/DL (ref 32–36)
MCV RBC AUTO: 88 FL (ref 82–98)
MONOCYTES # BLD AUTO: 0.7 K/UL (ref 0.3–1)
MONOCYTES NFR BLD: 6.3 % (ref 4–15)
NEUTROPHILS # BLD AUTO: 6.1 K/UL (ref 1.8–7.7)
NEUTROPHILS NFR BLD: 59 % (ref 38–73)
NRBC BLD-RTO: 0 /100 WBC
PHOSPHATE SERPL-MCNC: 4.3 MG/DL (ref 2.7–4.5)
PLATELET # BLD AUTO: 684 K/UL (ref 150–450)
PMV BLD AUTO: 8.3 FL (ref 9.2–12.9)
POCT GLUCOSE: 92 MG/DL (ref 70–110)
POCT GLUCOSE: 93 MG/DL (ref 70–110)
POTASSIUM SERPL-SCNC: 3.5 MMOL/L (ref 3.5–5.1)
PROT SERPL-MCNC: 7.2 G/DL (ref 6–8.4)
RBC # BLD AUTO: 4.16 M/UL (ref 4–5.4)
SODIUM SERPL-SCNC: 137 MMOL/L (ref 136–145)
WBC # BLD AUTO: 10.4 K/UL (ref 3.9–12.7)

## 2023-01-05 PROCEDURE — 94640 AIRWAY INHALATION TREATMENT: CPT

## 2023-01-05 PROCEDURE — 99233 SBSQ HOSP IP/OBS HIGH 50: CPT | Mod: ,,,

## 2023-01-05 PROCEDURE — 80053 COMPREHEN METABOLIC PANEL: CPT

## 2023-01-05 PROCEDURE — 25000242 PHARM REV CODE 250 ALT 637 W/ HCPCS

## 2023-01-05 PROCEDURE — 63600175 PHARM REV CODE 636 W HCPCS

## 2023-01-05 PROCEDURE — 36415 COLL VENOUS BLD VENIPUNCTURE: CPT

## 2023-01-05 PROCEDURE — 84100 ASSAY OF PHOSPHORUS: CPT

## 2023-01-05 PROCEDURE — 25000003 PHARM REV CODE 250

## 2023-01-05 PROCEDURE — 99900035 HC TECH TIME PER 15 MIN (STAT)

## 2023-01-05 PROCEDURE — 85025 COMPLETE CBC W/AUTO DIFF WBC: CPT

## 2023-01-05 PROCEDURE — 83735 ASSAY OF MAGNESIUM: CPT

## 2023-01-05 PROCEDURE — 25000003 PHARM REV CODE 250: Performed by: HOSPITALIST

## 2023-01-05 PROCEDURE — 99233 PR SUBSEQUENT HOSPITAL CARE,LEVL III: ICD-10-PCS | Mod: ,,,

## 2023-01-05 PROCEDURE — 94761 N-INVAS EAR/PLS OXIMETRY MLT: CPT

## 2023-01-05 PROCEDURE — 63700000 PHARM REV CODE 250 ALT 637 W/O HCPCS

## 2023-01-05 PROCEDURE — 27000221 HC OXYGEN, UP TO 24 HOURS

## 2023-01-05 PROCEDURE — 20600001 HC STEP DOWN PRIVATE ROOM

## 2023-01-05 RX ORDER — IPRATROPIUM BROMIDE AND ALBUTEROL SULFATE 2.5; .5 MG/3ML; MG/3ML
3 SOLUTION RESPIRATORY (INHALATION)
Status: DISCONTINUED | OUTPATIENT
Start: 2023-01-05 | End: 2023-01-06

## 2023-01-05 RX ORDER — CARBAMAZEPINE 200 MG/1
600 TABLET, EXTENDED RELEASE ORAL 2 TIMES DAILY
Status: DISCONTINUED | OUTPATIENT
Start: 2023-01-05 | End: 2023-01-07 | Stop reason: HOSPADM

## 2023-01-05 RX ADMIN — CEFTRIAXONE 2 G: 2 INJECTION, POWDER, FOR SOLUTION INTRAMUSCULAR; INTRAVENOUS at 08:01

## 2023-01-05 RX ADMIN — METHOCARBAMOL 500 MG: 500 TABLET ORAL at 04:01

## 2023-01-05 RX ADMIN — METHOCARBAMOL 500 MG: 500 TABLET ORAL at 08:01

## 2023-01-05 RX ADMIN — PROPRANOLOL HYDROCHLORIDE 120 MG: 40 TABLET ORAL at 10:01

## 2023-01-05 RX ADMIN — AZITHROMYCIN MONOHYDRATE 250 MG: 250 TABLET ORAL at 08:01

## 2023-01-05 RX ADMIN — IPRATROPIUM BROMIDE AND ALBUTEROL SULFATE 3 ML: 2.5; .5 SOLUTION RESPIRATORY (INHALATION) at 12:01

## 2023-01-05 RX ADMIN — IPRATROPIUM BROMIDE AND ALBUTEROL SULFATE 3 ML: .5; 3 SOLUTION RESPIRATORY (INHALATION) at 07:01

## 2023-01-05 RX ADMIN — GUAIFENESIN 600 MG: 600 TABLET, EXTENDED RELEASE ORAL at 08:01

## 2023-01-05 RX ADMIN — IPRATROPIUM BROMIDE AND ALBUTEROL SULFATE 3 ML: 2.5; .5 SOLUTION RESPIRATORY (INHALATION) at 07:01

## 2023-01-05 RX ADMIN — CARBAMAZEPINE 600 MG: 200 TABLET, EXTENDED RELEASE ORAL at 08:01

## 2023-01-05 RX ADMIN — SODIUM CHLORIDE, POTASSIUM CHLORIDE, SODIUM LACTATE AND CALCIUM CHLORIDE 500 ML: 600; 310; 30; 20 INJECTION, SOLUTION INTRAVENOUS at 05:01

## 2023-01-05 RX ADMIN — ENOXAPARIN SODIUM 40 MG: 40 INJECTION SUBCUTANEOUS at 04:01

## 2023-01-05 RX ADMIN — IPRATROPIUM BROMIDE AND ALBUTEROL SULFATE 3 ML: 2.5; .5 SOLUTION RESPIRATORY (INHALATION) at 03:01

## 2023-01-05 RX ADMIN — CETIRIZINE HYDROCHLORIDE 10 MG: 10 TABLET, FILM COATED ORAL at 08:01

## 2023-01-05 RX ADMIN — IPRATROPIUM BROMIDE AND ALBUTEROL SULFATE 3 ML: 2.5; .5 SOLUTION RESPIRATORY (INHALATION) at 11:01

## 2023-01-05 RX ADMIN — METHOCARBAMOL 500 MG: 500 TABLET ORAL at 12:01

## 2023-01-05 NOTE — PLAN OF CARE
Patient alert and oriented x4, up to chair. Sats 92% on room air. PO intake good. Call bell within reach.

## 2023-01-05 NOTE — PLAN OF CARE
[facility] ([place] [address])  Initial Discharge Assessment       Primary Care Provider: Amrik Owen MD    Admission Diagnosis: Cough [R05.9]  SOB (shortness of breath) [R06.02]  Hypoxia [R09.02]  Chest pain [R07.9]  Pneumonia of both lungs due to infectious organism, unspecified part of lung [J18.9]    Admission Date: 1/3/2023  Expected Discharge Date: 1/6/2023    Discharge Barriers Identified: None    Payor: CIGNA / Plan: CIGNA OPEN ACCESS PLUS / Product Type: Commercial /     Extended Emergency Contact Information  Primary Emergency Contact: LisbetLeylaGeorge  Mobile Phone: [phone]  Relation: Spouse  Secondary Emergency Contact: Lee Frausto  Address: [address] [place] [address] [facility]  Home Phone: [phone]  Work Phone: [phone]  Mobile Phone: [phone]  Relation: Son    Discharge Plan A: Home with family  Discharge Plan B: Home with family      [facility]  [place] [address].  [address].  [place] [address]  Phone: [phone] Fax: [phone]    [id number] - [place] - [address] AT [facility] [address]  [address] [place] [address]  Phone: [phone] Fax: [phone]      Initial Assessment (most recent)       Adult Discharge Assessment - 01/05/23 1524          Discharge Assessment    Assessment Type Discharge Planning Assessment     Confirmed/corrected address, phone number and insurance Yes     Confirmed Demographics Correct on Facesheet     Source of Information patient     Communicated AVA with patient/caregiver Date not available/Unable to determine     Reason For Admission PNA     People in Home spouse;child(ruiz), dependent     Do you expect to return to your current living situation? Yes     Prior to hospitilization cognitive status: Alert/Oriented     Current cognitive status: Alert/Oriented     Readmission within 30 days? Yes      Patient currently being followed by outpatient case management? No     Do you currently have service(s) that help you manage your care at home? No     Do you take prescription medications? Yes     Do you have prescription coverage? Yes     Coverage Cigna/Cigna Open Access Plus     Do you have any problems affording any of your prescribed medications? No     Is the patient taking medications as prescribed? yes     Who is going to help you get home at discharge? A family member-depends on the time of day (P)      How do you get to doctors appointments? car, drives self     Are you on dialysis? No     Do you take coumadin? No     Discharge Plan A Home with family     Discharge Plan B Home with family     DME Needed Upon Discharge  none     Discharge Plan discussed with: Patient     Discharge Barriers Identified None                           CM completed Discharge Planning Assessment with pt via bedside. Discharge planning booklet given to patient/family and whiteboard updated with AVA and phone #. All questions answered.     Patient reported that one of her family members will provide transportation upon discharge.      Functional status was independent prior and patient utilized no assistive equipment.      Patient lives in a house with no steps to enter.          Eleni Roblero RN-  Case Management  [phone]

## 2023-01-05 NOTE — PHARMACY MED REC
"          Admission Medication History     The home medication history was taken by Papo Wolfe.    You may go to "Admission" then "Reconcile Home Medications" tabs to review and/or act upon these items.     The home medication list has been updated by the Pharmacy department.   Please read ALL comments highlighted in yellow.   Please address this information as you see fit.    Feel free to contact us if you have any questions or require assistance.        Medications listed below were obtained from: Patient/family  PTA Medications   Medication Sig    INNOPRAN  mg Cp24   TAKE 1 CAPSULE BY MOUTH EVERY MORNING    loratadine (CLARITIN) 10 mg tablet   Take 1 tablet (10 mg total) by mouth once daily.    TEGRETOL  mg 12 hr tablet   TAKE THREE TABLETS BY MOUTH TWICE DAILY    albuterol (VENTOLIN HFA) 90 mcg/actuation inhaler     Inhale 2 puffs into the lungs every 6 (six) hours as needed for Wheezing. Rescue (Patient not taking: Reported on 1/3/2023)    ezetimibe (ZETIA) 10 mg tablet     Take 1 tablet (10 mg total) by mouth once daily. (Patient not taking: Reported on 12/27/2022)    ondansetron (ZOFRAN-ODT) 4 MG TbDL Take 1 tablet (4 mg total) by mouth every 6 (six) hours as needed (nausea). (Patient not taking: Reported on 12/27/2022)       Potential issues to be addressed PRIOR TO DISCHARGE  Patient requires education regarding drug therapies     Papo Wolfe  EXT [id number]        .        "

## 2023-01-05 NOTE — PLAN OF CARE
[facility] ([place] [address])  Initial Discharge Assessment       Primary Care Provider: Amrik Owen MD    Admission Diagnosis: Cough [R05.9]  SOB (shortness of breath) [R06.02]  Hypoxia [R09.02]  Chest pain [R07.9]  Pneumonia of both lungs due to infectious organism, unspecified part of lung [J18.9]    Admission Date: 1/3/2023  Expected Discharge Date: 1/6/2023    Discharge Barriers Identified: (P) None    Payor: CIGNA / Plan: CIGNA OPEN ACCESS PLUS / Product Type: Commercial /     Extended Emergency Contact Information  Primary Emergency Contact: George Frausto  Mobile Phone: [phone]  Relation: Spouse  Secondary Emergency Contact: Lee Frausto  Address: [address] [place] [address] [facility]  Home Phone: [phone]  Work Phone: [phone]  Mobile Phone: [phone]  Relation: Son    Discharge Plan A: (P) Home with family  Discharge Plan B: (P) Home with family      [facility]  - [place] [address]  [address]  [place] [address]  Phone: [phone] Fax: [phone]    [id number] - [place] - [address] AT [place] [address] [address] [place] [place] [address]  Phone: [phone] Fax: [phone]      Initial Assessment (most recent)       Adult Discharge Assessment - 01/05/23 1524          Discharge Assessment    Assessment Type Discharge Planning Assessment (P)      Confirmed/corrected address, phone number and insurance Yes (P)      Confirmed Demographics Correct on Facesheet (P)      Source of Information patient (P)      Communicated AVA with patient/caregiver Date not available/Unable to determine (P)      Reason For Admission PNA (P)      People in Home spouse;child(ruiz), dependent (P)      Do you expect to return to your current living situation? Yes (P)      Prior to hospitilization cognitive status: Alert/Oriented (P)      Current cognitive status:  Alert/Oriented (P)      Readmission within 30 days? Yes (P)      Patient currently being followed by outpatient case management? Unable to determine (comments) (P)      Do you currently have service(s) that help you manage your care at home? No (P)      Do you take prescription medications? Yes (P)      Do you have prescription coverage? Yes (P)      Who is going to help you get home at discharge? [employer]ign[employer]/C[employer]a Open Access Plus (P)      How do you get to doctors appointments? car, drives self (P)      Are you on dialysis? No (P)      Do you take coumadin? No (P)      Discharge Plan A Home with family (P)      Discharge Plan B Home with family (P)      DME Needed Upon Discharge  none (P)      Discharge Plan discussed with: Patient (P)      Discharge Barriers Identified None (P)                           CM completed Discharge Planning Assessment with pt via bedside. Discharge planning booklet given to patient/family and whiteboard updated with AVA and phone #. All questions answered.    Patient reported that one of her family members will provide transportation upon discharge.     Functional status was independent prior and patient utilized no assistive equipment.     Patient lives in a house with no steps to enter.     Doesn't receive dialysis. Not taking Coumadin.      Eleni Roblero RN-CM    [phone]

## 2023-01-05 NOTE — NURSING
Pt arrived on unit via bed, accompanied by transporter. Pt alert and oriented  x 4. Pt safely transferred to room. Pt oriented to staff, room, and unit procedures. Pt provided hx and is able to answer all questions. Pt has no complaints at this time. Will continue to monitor

## 2023-01-06 PROBLEM — R07.89 CHEST WALL PAIN: Status: ACTIVE | Noted: 2023-01-06

## 2023-01-06 LAB
ALBUMIN SERPL BCP-MCNC: 3.1 G/DL (ref 3.5–5.2)
ALP SERPL-CCNC: 143 U/L (ref 55–135)
ALT SERPL W/O P-5'-P-CCNC: 23 U/L (ref 10–44)
ANION GAP SERPL CALC-SCNC: 13 MMOL/L (ref 8–16)
AST SERPL-CCNC: 22 U/L (ref 10–40)
BACTERIA SPEC AEROBE CULT: NORMAL
BACTERIA SPEC AEROBE CULT: NORMAL
BASOPHILS # BLD AUTO: 0.1 K/UL (ref 0–0.2)
BASOPHILS NFR BLD: 0.8 % (ref 0–1.9)
BILIRUB SERPL-MCNC: 0.1 MG/DL (ref 0.1–1)
BUN SERPL-MCNC: 8 MG/DL (ref 6–20)
CALCIUM SERPL-MCNC: 9.2 MG/DL (ref 8.7–10.5)
CHLORIDE SERPL-SCNC: 107 MMOL/L (ref 95–110)
CO2 SERPL-SCNC: 19 MMOL/L (ref 23–29)
CREAT SERPL-MCNC: 0.7 MG/DL (ref 0.5–1.4)
DIFFERENTIAL METHOD: ABNORMAL
EOSINOPHIL # BLD AUTO: 0.4 K/UL (ref 0–0.5)
EOSINOPHIL NFR BLD: 3 % (ref 0–8)
ERYTHROCYTE [DISTWIDTH] IN BLOOD BY AUTOMATED COUNT: 14.6 % (ref 11.5–14.5)
EST. GFR  (NO RACE VARIABLE): >60 ML/MIN/1.73 M^2
GLUCOSE SERPL-MCNC: 98 MG/DL (ref 70–110)
GRAM STN SPEC: NORMAL
HCT VFR BLD AUTO: 39.7 % (ref 37–48.5)
HGB BLD-MCNC: 11.9 G/DL (ref 12–16)
IMM GRANULOCYTES # BLD AUTO: 0.04 K/UL (ref 0–0.04)
IMM GRANULOCYTES NFR BLD AUTO: 0.3 % (ref 0–0.5)
LYMPHOCYTES # BLD AUTO: 2.9 K/UL (ref 1–4.8)
LYMPHOCYTES NFR BLD: 24.8 % (ref 18–48)
MAGNESIUM SERPL-MCNC: 2.1 MG/DL (ref 1.6–2.6)
MCH RBC QN AUTO: 26.9 PG (ref 27–31)
MCHC RBC AUTO-ENTMCNC: 30 G/DL (ref 32–36)
MCV RBC AUTO: 90 FL (ref 82–98)
MONOCYTES # BLD AUTO: 0.8 K/UL (ref 0.3–1)
MONOCYTES NFR BLD: 6.7 % (ref 4–15)
NEUTROPHILS # BLD AUTO: 7.6 K/UL (ref 1.8–7.7)
NEUTROPHILS NFR BLD: 64.4 % (ref 38–73)
NRBC BLD-RTO: 0 /100 WBC
PHOSPHATE SERPL-MCNC: 3.8 MG/DL (ref 2.7–4.5)
PLATELET # BLD AUTO: 659 K/UL (ref 150–450)
PMV BLD AUTO: 8.4 FL (ref 9.2–12.9)
POTASSIUM SERPL-SCNC: 3.9 MMOL/L (ref 3.5–5.1)
PROT SERPL-MCNC: 7.2 G/DL (ref 6–8.4)
RBC # BLD AUTO: 4.42 M/UL (ref 4–5.4)
SODIUM SERPL-SCNC: 139 MMOL/L (ref 136–145)
WBC # BLD AUTO: 11.8 K/UL (ref 3.9–12.7)

## 2023-01-06 PROCEDURE — 80053 COMPREHEN METABOLIC PANEL: CPT

## 2023-01-06 PROCEDURE — 20600001 HC STEP DOWN PRIVATE ROOM

## 2023-01-06 PROCEDURE — 83735 ASSAY OF MAGNESIUM: CPT

## 2023-01-06 PROCEDURE — 27000221 HC OXYGEN, UP TO 24 HOURS

## 2023-01-06 PROCEDURE — 36415 COLL VENOUS BLD VENIPUNCTURE: CPT

## 2023-01-06 PROCEDURE — 25000242 PHARM REV CODE 250 ALT 637 W/ HCPCS

## 2023-01-06 PROCEDURE — 94640 AIRWAY INHALATION TREATMENT: CPT

## 2023-01-06 PROCEDURE — 94761 N-INVAS EAR/PLS OXIMETRY MLT: CPT

## 2023-01-06 PROCEDURE — 63600175 PHARM REV CODE 636 W HCPCS

## 2023-01-06 PROCEDURE — 84100 ASSAY OF PHOSPHORUS: CPT

## 2023-01-06 PROCEDURE — 85025 COMPLETE CBC W/AUTO DIFF WBC: CPT

## 2023-01-06 PROCEDURE — 25000003 PHARM REV CODE 250

## 2023-01-06 PROCEDURE — 63700000 PHARM REV CODE 250 ALT 637 W/O HCPCS

## 2023-01-06 PROCEDURE — 99233 SBSQ HOSP IP/OBS HIGH 50: CPT | Mod: ,,,

## 2023-01-06 PROCEDURE — 25000003 PHARM REV CODE 250: Performed by: HOSPITALIST

## 2023-01-06 PROCEDURE — 99233 PR SUBSEQUENT HOSPITAL CARE,LEVL III: ICD-10-PCS | Mod: ,,,

## 2023-01-06 PROCEDURE — 99900035 HC TECH TIME PER 15 MIN (STAT)

## 2023-01-06 RX ORDER — LIDOCAINE 50 MG/G
2 PATCH TOPICAL
Status: DISCONTINUED | OUTPATIENT
Start: 2023-01-06 | End: 2023-01-07 | Stop reason: HOSPADM

## 2023-01-06 RX ORDER — MAGNESIUM SULFATE HEPTAHYDRATE 40 MG/ML
2 INJECTION, SOLUTION INTRAVENOUS ONCE
Status: COMPLETED | OUTPATIENT
Start: 2023-01-06 | End: 2023-01-06

## 2023-01-06 RX ORDER — BENZONATATE 100 MG/1
100 CAPSULE ORAL 3 TIMES DAILY
Status: DISCONTINUED | OUTPATIENT
Start: 2023-01-06 | End: 2023-01-07 | Stop reason: HOSPADM

## 2023-01-06 RX ORDER — METHOCARBAMOL 750 MG/1
750 TABLET, FILM COATED ORAL 4 TIMES DAILY
Status: DISCONTINUED | OUTPATIENT
Start: 2023-01-06 | End: 2023-01-07 | Stop reason: HOSPADM

## 2023-01-06 RX ORDER — FLUTICASONE PROPIONATE 50 MCG
2 SPRAY, SUSPENSION (ML) NASAL DAILY
Status: DISCONTINUED | OUTPATIENT
Start: 2023-01-06 | End: 2023-01-07 | Stop reason: HOSPADM

## 2023-01-06 RX ORDER — IPRATROPIUM BROMIDE AND ALBUTEROL SULFATE 2.5; .5 MG/3ML; MG/3ML
3 SOLUTION RESPIRATORY (INHALATION)
Status: DISCONTINUED | OUTPATIENT
Start: 2023-01-06 | End: 2023-01-07 | Stop reason: HOSPADM

## 2023-01-06 RX ORDER — ACETAMINOPHEN 500 MG
1000 TABLET ORAL EVERY 6 HOURS
Status: DISCONTINUED | OUTPATIENT
Start: 2023-01-06 | End: 2023-01-07 | Stop reason: HOSPADM

## 2023-01-06 RX ADMIN — METHOCARBAMOL 750 MG: 750 TABLET ORAL at 08:01

## 2023-01-06 RX ADMIN — MAGNESIUM SULFATE 2 G: 2 INJECTION INTRAVENOUS at 12:01

## 2023-01-06 RX ADMIN — METHOCARBAMOL 750 MG: 750 TABLET ORAL at 05:01

## 2023-01-06 RX ADMIN — AZITHROMYCIN MONOHYDRATE 250 MG: 250 TABLET ORAL at 08:01

## 2023-01-06 RX ADMIN — CETIRIZINE HYDROCHLORIDE 10 MG: 10 TABLET, FILM COATED ORAL at 08:01

## 2023-01-06 RX ADMIN — PROPRANOLOL HYDROCHLORIDE 120 MG: 40 TABLET ORAL at 08:01

## 2023-01-06 RX ADMIN — ACETAMINOPHEN 1000 MG: 500 TABLET ORAL at 05:01

## 2023-01-06 RX ADMIN — ACETAMINOPHEN 1000 MG: 500 TABLET ORAL at 11:01

## 2023-01-06 RX ADMIN — FLUTICASONE PROPIONATE 100 MCG: 50 SPRAY, METERED NASAL at 02:01

## 2023-01-06 RX ADMIN — IPRATROPIUM BROMIDE AND ALBUTEROL SULFATE 3 ML: .5; 3 SOLUTION RESPIRATORY (INHALATION) at 08:01

## 2023-01-06 RX ADMIN — ENOXAPARIN SODIUM 40 MG: 40 INJECTION SUBCUTANEOUS at 05:01

## 2023-01-06 RX ADMIN — IPRATROPIUM BROMIDE AND ALBUTEROL SULFATE 3 ML: .5; 3 SOLUTION RESPIRATORY (INHALATION) at 04:01

## 2023-01-06 RX ADMIN — CARBAMAZEPINE 600 MG: 200 TABLET, EXTENDED RELEASE ORAL at 08:01

## 2023-01-06 RX ADMIN — METHOCARBAMOL 750 MG: 750 TABLET ORAL at 12:01

## 2023-01-06 RX ADMIN — CEFTRIAXONE 2 G: 2 INJECTION, POWDER, FOR SOLUTION INTRAMUSCULAR; INTRAVENOUS at 08:01

## 2023-01-06 RX ADMIN — BENZONATATE 100 MG: 100 CAPSULE ORAL at 08:01

## 2023-01-06 RX ADMIN — GUAIFENESIN 600 MG: 600 TABLET, EXTENDED RELEASE ORAL at 08:01

## 2023-01-06 RX ADMIN — BENZONATATE 100 MG: 100 CAPSULE ORAL at 02:01

## 2023-01-06 RX ADMIN — LIDOCAINE 2 PATCH: 50 PATCH CUTANEOUS at 11:01

## 2023-01-06 RX ADMIN — METHOCARBAMOL 500 MG: 500 TABLET ORAL at 08:01

## 2023-01-06 NOTE — PLAN OF CARE
Pt unable to complete 6 minute walk test without desat, ambulated back to bed, reapplied O2 at 1L NC, HOB elevated, incentive spirometer provided with education, personal items and call light in reach, bed in low position.

## 2023-01-06 NOTE — PROGRESS NOTES
[facility] - [facility] ([address] Medicine  Progress Note    Patient Name: Marliu Frausto  MRN: [id number]  Patient Class: IP- Inpatient   Admission Date: 1/3/2023  Length of Stay: 1 days  Attending Physician: Mana Meneses MD  Primary Care Provider: Amrik Owen MD        Subjective:     Principal Problem:Acute respiratory failure with hypoxia        HPI:  Marilu Frausto is a 42 y.o. female with a hx of seizures and migraines presents to the ED for increased shortness of breath since 12/19. Patient states she was diagnosed with bronchitis on 12/19 and was sent home with steroids which provided no relief. She was then seen on the 22th and was found to have the flu with associated hypoxia. Finished course of tamiflu without relief of symptoms.Upon discharge from that hospital stay patient endorsed O2 sats at home remained around 90% and felt that she was SOB with only a minor amount of exertion. States she is unable to complete simple activites at home with SOB. Patient also endorses having to sleep upright on couch because she is unable to breath while laying flat. Endorses left sided chest pain with coughing, dry cough and subjective fevers at home. Denies abdominal pain, constipation/ diarrhea and urinary symptoms.     ED: AF, VSS. WBC 15K. CMP unremarkable. Tn neg. COVID/ flu neg. EKG NSR w/o ischemic changes. CXR showed suspected mild bibasilar. CTA Chest showed no evidence of acute pulmonary embolus to the proximal segmental level. Findings suggestive of bronchopneumonia or other infectious/inflammatory process most pronounced in the bilateral lower lobes as described in the body of the report. Give duo nebs, azithromycin and rocephin in the ED.      Overview/Hospital Course:  Pt placed in observation for management of acute hypoxic respiratory failure 2/2 pneumonia. Pt receiving scheduled duonebs, azithromycin, and rocephin. Plan to discharge home when stable.       Interval History: Pt  seen and examined by me this morning with family member at bedside. Mildly hypotensive otherwise VSSAF. MARYLOU since admission. Pt reports feeling somewhat improved since admission, still noting cough, which is less productive, and SOB, worse with exertion. Pt sating 96% on 3L this am, plan to attempt O2 wean today and decrease duonebs from q4 to q6 while awake. Pt also reporting mild sensitivity to L lateral chest wall, worse with palpation and deep breathing.     Review of Systems   Constitutional:  Negative for activity change, chills and fever.   HENT:  Positive for congestion. Negative for trouble swallowing.    Eyes:  Negative for photophobia and visual disturbance.   Respiratory:  Positive for cough and shortness of breath. Negative for chest tightness.    Cardiovascular:  Positive for chest pain (2/2 cough). Negative for palpitations and leg swelling.   Gastrointestinal:  Negative for abdominal pain, constipation, diarrhea, nausea and vomiting.   Genitourinary:  Negative for dysuria, frequency, hematuria and vaginal bleeding.   Musculoskeletal:  Negative for back pain, gait problem and neck pain.   Skin:  Negative for rash and wound.   Neurological:  Negative for dizziness, syncope, speech difficulty and light-headedness.   Psychiatric/Behavioral:  Negative for agitation and confusion. The patient is not nervous/anxious.    Objective:     Vital Signs (Most Recent):  Temp: 98.2 °F (36.8 °C) (01/05/23 1527)  Pulse: 70 (01/05/23 1527)  Resp: 18 (01/05/23 1527)  BP: (!) 94/53 (01/05/23 1527)  SpO2: (!) 93 % (01/05/23 1748)   Vital Signs (24h Range):  Temp:  [96.6 °F (35.9 °C)-98.2 °F (36.8 °C)] 98.2 °F (36.8 °C)  Pulse:  [67-97] 70  Resp:  [18-20] 18  SpO2:  [90 %-96 %] 93 %  BP: ()/(53-70) 94/53     Weight: 73.7 kg (162 lb 7.7 oz)  Body mass index is 30.7 kg/m².    Intake/Output Summary (Last 24 hours) at 1/5/2023 1950  Last data filed at 1/5/2023 1746  Gross per 24 hour   Intake 628.99 ml   Output --   Net  628.99 ml      Physical Exam  Vitals and nursing note reviewed.   Constitutional:       General: She is not in acute distress.     Appearance: She is well-developed.   HENT:      Head: Normocephalic and atraumatic.      Nose: No congestion.      Mouth/Throat:      Pharynx: No oropharyngeal exudate.   Eyes:      Conjunctiva/sclera: Conjunctivae normal.      Pupils: Pupils are equal, round, and reactive to light.   Cardiovascular:      Rate and Rhythm: Normal rate and regular rhythm.      Heart sounds: Normal heart sounds.   Pulmonary:      Effort: Pulmonary effort is normal. No respiratory distress.      Breath sounds: Rhonchi (bilteral lower lung fields) present. No wheezing.   Abdominal:      General: Bowel sounds are normal. There is no distension.      Palpations: Abdomen is soft.      Tenderness: There is no abdominal tenderness.   Musculoskeletal:         General: No tenderness. Normal range of motion.      Cervical back: Normal range of motion and neck supple.   Lymphadenopathy:      Cervical: No cervical adenopathy.   Skin:     General: Skin is warm and dry.      Capillary Refill: Capillary refill takes less than 2 seconds.      Findings: No rash.   Neurological:      Mental Status: She is alert and oriented to person, place, and time.      Cranial Nerves: No cranial nerve deficit.      Sensory: No sensory deficit.      Coordination: Coordination normal.   Psychiatric:         Behavior: Behavior normal.         Thought Content: Thought content normal.         Judgment: Judgment normal.       Significant Labs: All pertinent labs within the past 24 hours have been reviewed.  CBC:   Recent Labs   Lab 01/03/23  2103 01/04/23  0436 01/05/23  0509   WBC 15.86* 13.64* 10.40   HGB 13.0 11.5* 11.3*   HCT 42.6 37.6 36.6*   * 618* 684*     CMP:   Recent Labs   Lab 01/03/23  2220 01/04/23  0436 01/05/23  0509    136 137   K 3.9 3.7 3.5    104 106   CO2 23 19* 21*    107 93   BUN 9 8 9    CREATININE 0.7 0.7 0.7   CALCIUM 9.6 9.1 9.2   PROT  --  7.6 7.2   ALBUMIN  --  3.1* 3.0*   BILITOT  --  0.2 0.1   ALKPHOS  --  133 140*   AST  --  23 19   ALT  --  17 21   ANIONGAP 11 13 10     Cardiac Markers:   Recent Labs   Lab 01/03/23  2103   BNP 12       Significant Imaging: I have reviewed all pertinent imaging results/findings within the past 24 hours.    CTA Chest Non-Coronary (PE Studies)  Narrative: EXAMINATION:  CTA CHEST NON CORONARY (PE STUDIES)    CLINICAL HISTORY:  Pulmonary embolism (PE) suspected, high prob;    TECHNIQUE:  Low dose axial images, sagittal and coronal reformations were obtained from the thoracic inlet to the lung bases following the IV administration of 75 mL of Omnipaque 350.  Contrast timing was optimized to evaluate the pulmonary arteries.  MIP images were performed.    COMPARISON:  Chest radiograph, 01/03/2023 and 12/22/2022.    FINDINGS:  Examination of the soft tissue and vascular structures at the base of the neck is negative for acute finding.  There is a 1 cm hypodensity in the left lobe of the thyroid, too small for dedicated follow-up.    The thoracic aorta maintains normal caliber, contour, and course without significant atherosclerotic calcification.  There is no evidence of aneurysmal dilation or dissection.    The pulmonary arteries distribute normally without evidence of filling defect to indicate pulmonary thromboembolism.    The trachea and proximal airways are patent.    The lungs are symmetrically expanded and there is diffuse peribronchial wall thickening with prominent interstitial and tree-in-bud opacities throughout the inferior aspect of the bilateral upper lobes, lingula, right middle lobe, and bilateral lower lobes.  Few smaller areas of patchy airspace disease in the bilateral inferior lower lobes and inferior aspect of the right middle lobe.  Mucous plugging versus retained secretions in the bilateral lower lobes and right middle lobe.  No evidence of  pleural fluid.  No pneumothorax.    The heart is not enlarged.  No pericardial effusion.    There is no axillary, mediastinal, or hilar lymph node enlargement.    The esophagus maintains a normal course and caliber.    Limited images of the upper abdomen obtained during the course of this dedicated thoracic CT is negative for acute findings.    The osseous structures are negative for acute finding or aggressive osseous lesion.  Incidentally noted vacuum phenomena in the bilateral shoulder joints.  Impression: No evidence of acute pulmonary embolus to the proximal segmental level.    Findings suggestive of bronchopneumonia or other infectious/inflammatory process most pronounced in the bilateral lower lobes as described in the body of the report.    Electronically signed by: Caden Gee MD  Date:    01/03/2023  Time:    23:12  X-Ray Chest PA And Lateral  Narrative: EXAMINATION:  XR CHEST PA AND LATERAL    CLINICAL HISTORY:  Cough, unspecified    TECHNIQUE:  PA and lateral views of the chest were performed.    COMPARISON:  Chest radiograph December 22, 2022    FINDINGS:  Two views of the chest are submitted.  The appearance of the cardiomediastinal silhouette is appropriate.  Mild accentuated pulmonary opacity the lung bases may relate to mild basilar infiltrate without dense consolidation.  There is no evidence for pleural effusion or pneumothorax, the osseous structures appear intact.  Impression: Suspected mild bibasilar infiltrate as above.    Electronically signed by: Aurelio Lux  Date:    01/03/2023  Time:    22:07         Assessment/Plan:      * Acute respiratory failure with hypoxia  Patient with Hypoxic Respiratory failure which is Acute.  She is not on home oxygen. Supplemental oxygen was provided and noted-  .   Signs/symptoms of respiratory failure include- tachypnea. Contributing diagnoses includes - Pneumonia Labs and images were reviewed. Patient Has not had a recent ABG. Will treat underlying  causes and adjust management of respiratory failure as follows    Pneumonia  Patient presents to the ED for worsening SOB with a dry cough since 12/19. Has received treatment for bronchitis and the flu without relief.   Leukocytosis  - Afebrile, WBC 15K and downtrending   - 95% on 2L, wean as tolerated  - CTA showed no evidence of acute pulmonary embolus to the proximal segmental level. Findings suggestive of bronchopneumonia or other infectious/inflammatory process most pronounced in the bilateral lower lobes as described in the body of the report.  - continue azirtho/rocephin x 5 days  - Check respiratory cultures  - Duonebs q6h while awake and prn with IS  - Guaifenesin and Tessalon Perles prn cough  - Attempt to wean O2 as tolerated  - Plan for 6MW tomorrow    Hypotension  - Likely 2/2 dehydration  - 500 cc LR bolus today  - Encourage PO hydration    Pneumonia  Patient presents to the ED for worsening SOB with a dry cough since 12/19. Has received treatment for bronchitis and the flu without relief.   Leukocytosis  - Afebrile, WBC 15K  - 95% on 2L, wean as tolerated  - CTA showed no evidence of acute pulmonary embolus to the proximal segmental level. Findings suggestive of bronchopneumonia or other infectious/inflammatory process most pronounced in the bilateral lower lobes as described in the body of the report.  - continue azirtho/rocephin x 5 days  - Check respiratory cultures  - Duonebs q4h while awake and prn with IS  - Guaifenesin, Codeine cough syrup, and Tessalon Perles prn cough    Epilepsy  - continue home tegretol    Migraine without status migrainosus, not intractable  - continue home propanolol       VTE Risk Mitigation (From admission, onward)         Ordered     enoxaparin injection 40 mg  Daily         01/03/23 2350     IP VTE HIGH RISK PATIENT  Once         01/03/23 2350                Discharge Planning   AVA: 1/6/2023     Code Status: Full Code   Is the patient medically ready for discharge?:  No    Reason for patient still in hospital (select all that apply): Patient trending condition and Treatment  Discharge Plan A: Home with family                  Yoli Wolfe PA-C  Department of Hospital Medicine   [facility] - Intensive Care ([place]-14)

## 2023-01-06 NOTE — HOSPITAL COURSE
Pt placed in observation for management of acute hypoxic respiratory failure 2/2 pneumonia. Pt received scheduled duonebs, azithromycin, rocephin and supportive care. Pt has passed six minute walk test and reports significant improvement in symptoms s/p 5 days of rocephin and azithromycin and supportive care. All questions were answered. Patient acknowledged understanding of discharge instructions and feels safe to discharge home. Patient was discharged on 1/7/2023 in stable condition with PCP follow-up on 1/9/23.

## 2023-01-06 NOTE — NURSING
Home Oxygen Evaluation    Date Performed: 2023    1) Patient's Home O2 Sat on room air, while at rest: 91        If O2 sats on room air at rest are 88% or below, patient qualifies. No additional testing needed. Document N/A in steps 2 and 3. If 89% or above, complete steps 2.      2) Patient's O2 Sat on room air while exercisin        If O2 sats on room air while exercising remain 89% or above patient does not qualify, no further testing needed Document N/A in step 3. If O2 sats on room air while exercising are 88% or below, continue to step 3.      3) Patient's O2 Sat while exercising on O2: 91 at 1 LPM         (Must show improvement from #2 for patients to qualify)    If O2 sats improve on oxygen, patient qualifies for portable oxygen. If not, the patient does not qualify.

## 2023-01-06 NOTE — PLAN OF CARE
Weaning O2. Pt does not have any qualifying diagnoses.  AVA: 1/7        Eleni Roblero RN-CM    [phone]

## 2023-01-06 NOTE — SUBJECTIVE & OBJECTIVE
Interval History: Pt seen and examined by me this morning. Mildly hypotensive otherwise VSSAF. NAEON. Pt reports feeling somewhat improved since admission but consistent with how she felt yesterday. Pt reports SOB constant and cough somewhat more productive compared to previous. Pt sating 93% on 1L this am. Attempted six minute walk and patient desaturated to 88% with trying to put shoes on. Pt also reports continued sensitivity to L lateral chest wall, worse with palpation and deep breathing.     Review of Systems   Constitutional:  Negative for activity change, chills and fever.   HENT:  Positive for congestion. Negative for trouble swallowing.    Eyes:  Negative for photophobia and visual disturbance.   Respiratory:  Positive for cough and shortness of breath. Negative for chest tightness.    Cardiovascular:  Positive for chest pain (2/2 cough). Negative for palpitations and leg swelling.   Gastrointestinal:  Negative for abdominal pain, constipation, diarrhea, nausea and vomiting.   Genitourinary:  Negative for dysuria, frequency, hematuria and vaginal bleeding.   Musculoskeletal:  Negative for back pain, gait problem and neck pain.   Skin:  Negative for rash and wound.   Neurological:  Negative for dizziness, syncope, speech difficulty and light-headedness.   Psychiatric/Behavioral:  Negative for agitation and confusion. The patient is not nervous/anxious.    Objective:     Vital Signs (Most Recent):  Temp: 97.7 °F (36.5 °C) (01/06/23 1632)  Pulse: 74 (01/06/23 1632)  Resp: 20 (01/06/23 1632)  BP: (!) 99/56 (01/06/23 1632)  SpO2: (!) 93 % (01/06/23 1641)   Vital Signs (24h Range):  Temp:  [97.7 °F (36.5 °C)-98.1 °F (36.7 °C)] 97.7 °F (36.5 °C)  Pulse:  [72-86] 74  Resp:  [17-20] 20  SpO2:  [91 %-98 %] 93 %  BP: ()/(54-71) 99/56     Weight: 73.7 kg (162 lb 7.7 oz)  Body mass index is 30.7 kg/m².    Intake/Output Summary (Last 24 hours) at 1/6/2023 7445  Last data filed at 1/6/2023 1706  Gross per 24 hour    Intake 634.53 ml   Output --   Net 634.53 ml        Physical Exam  Vitals and nursing note reviewed.   Constitutional:       General: She is not in acute distress.     Appearance: She is well-developed.   HENT:      Head: Normocephalic and atraumatic.      Nose: No congestion.      Mouth/Throat:      Pharynx: No oropharyngeal exudate.   Eyes:      Conjunctiva/sclera: Conjunctivae normal.      Pupils: Pupils are equal, round, and reactive to light.   Cardiovascular:      Rate and Rhythm: Normal rate and regular rhythm.      Heart sounds: Normal heart sounds.   Pulmonary:      Effort: Pulmonary effort is normal. No respiratory distress.      Breath sounds: Rhonchi (bilteral lower lung fields) present. No wheezing.   Abdominal:      General: Bowel sounds are normal. There is no distension.      Palpations: Abdomen is soft.      Tenderness: There is no abdominal tenderness.   Musculoskeletal:         General: No tenderness. Normal range of motion.      Cervical back: Normal range of motion and neck supple.   Lymphadenopathy:      Cervical: No cervical adenopathy.   Skin:     General: Skin is warm and dry.      Capillary Refill: Capillary refill takes less than 2 seconds.      Findings: No rash.   Neurological:      Mental Status: She is alert and oriented to person, place, and time.      Cranial Nerves: No cranial nerve deficit.      Sensory: No sensory deficit.      Coordination: Coordination normal.   Psychiatric:         Behavior: Behavior normal.         Thought Content: Thought content normal.         Judgment: Judgment normal.       Significant Labs: All pertinent labs within the past 24 hours have been reviewed.  CBC:   Recent Labs   Lab 01/05/23  0509 01/06/23  0242   WBC 10.40 11.80   HGB 11.3* 11.9*   HCT 36.6* 39.7   * 659*       CMP:   Recent Labs   Lab 01/05/23  0509 01/06/23  0242    139   K 3.5 3.9    107   CO2 21* 19*   GLU 93 98   BUN 9 8   CREATININE 0.7 0.7   CALCIUM 9.2 9.2    PROT 7.2 7.2   ALBUMIN 3.0* 3.1*   BILITOT 0.1 0.1   ALKPHOS 140* 143*   AST 19 22   ALT 21 23   ANIONGAP 10 13       Cardiac Markers:   No results for input(s): CKMB, MYOGLOBIN, BNP, TROPISTAT in the last 48 hours.      Significant Imaging: I have reviewed all pertinent imaging results/findings within the past 24 hours.    CTA Chest Non-Coronary (PE Studies)  Narrative: EXAMINATION:  CTA CHEST NON CORONARY (PE STUDIES)    CLINICAL HISTORY:  Pulmonary embolism (PE) suspected, high prob;    TECHNIQUE:  Low dose axial images, sagittal and coronal reformations were obtained from the thoracic inlet to the lung bases following the IV administration of 75 mL of Omnipaque 350.  Contrast timing was optimized to evaluate the pulmonary arteries.  MIP images were performed.    COMPARISON:  Chest radiograph, 01/03/2023 and 12/22/2022.    FINDINGS:  Examination of the soft tissue and vascular structures at the base of the neck is negative for acute finding.  There is a 1 cm hypodensity in the left lobe of the thyroid, too small for dedicated follow-up.    The thoracic aorta maintains normal caliber, contour, and course without significant atherosclerotic calcification.  There is no evidence of aneurysmal dilation or dissection.    The pulmonary arteries distribute normally without evidence of filling defect to indicate pulmonary thromboembolism.    The trachea and proximal airways are patent.    The lungs are symmetrically expanded and there is diffuse peribronchial wall thickening with prominent interstitial and tree-in-bud opacities throughout the inferior aspect of the bilateral upper lobes, lingula, right middle lobe, and bilateral lower lobes.  Few smaller areas of patchy airspace disease in the bilateral inferior lower lobes and inferior aspect of the right middle lobe.  Mucous plugging versus retained secretions in the bilateral lower lobes and right middle lobe.  No evidence of pleural fluid.  No pneumothorax.    The  heart is not enlarged.  No pericardial effusion.    There is no axillary, mediastinal, or hilar lymph node enlargement.    The esophagus maintains a normal course and caliber.    Limited images of the upper abdomen obtained during the course of this dedicated thoracic CT is negative for acute findings.    The osseous structures are negative for acute finding or aggressive osseous lesion.  Incidentally noted vacuum phenomena in the bilateral shoulder joints.  Impression: No evidence of acute pulmonary embolus to the proximal segmental level.    Findings suggestive of bronchopneumonia or other infectious/inflammatory process most pronounced in the bilateral lower lobes as described in the body of the report.    Electronically signed by: Caden Gee MD  Date:    01/03/2023  Time:    23:12  X-Ray Chest PA And Lateral  Narrative: EXAMINATION:  XR CHEST PA AND LATERAL    CLINICAL HISTORY:  Cough, unspecified    TECHNIQUE:  PA and lateral views of the chest were performed.    COMPARISON:  Chest radiograph December 22, 2022    FINDINGS:  Two views of the chest are submitted.  The appearance of the cardiomediastinal silhouette is appropriate.  Mild accentuated pulmonary opacity the lung bases may relate to mild basilar infiltrate without dense consolidation.  There is no evidence for pleural effusion or pneumothorax, the osseous structures appear intact.  Impression: Suspected mild bibasilar infiltrate as above.    Electronically signed by: Aurelio Lux  Date:    01/03/2023  Time:    22:07

## 2023-01-06 NOTE — SUBJECTIVE & OBJECTIVE
Interval History: Pt seen and examined by me this morning with family member at bedside. Mildly hypotensive otherwise VSSAF. MARYLOU since admission. Pt reports feeling somewhat improved since admission, still noting cough, which is less productive, and SOB, worse with exertion. Pt sating 96% on 3L this am, plan to attempt O2 wean today and decrease duonebs from q4 to q6 while awake. Pt also reporting mild sensitivity to L lateral chest wall, worse with palpation and deep breathing.     Review of Systems   Constitutional:  Negative for activity change, chills and fever.   HENT:  Positive for congestion. Negative for trouble swallowing.    Eyes:  Negative for photophobia and visual disturbance.   Respiratory:  Positive for cough and shortness of breath. Negative for chest tightness.    Cardiovascular:  Positive for chest pain (2/2 cough). Negative for palpitations and leg swelling.   Gastrointestinal:  Negative for abdominal pain, constipation, diarrhea, nausea and vomiting.   Genitourinary:  Negative for dysuria, frequency, hematuria and vaginal bleeding.   Musculoskeletal:  Negative for back pain, gait problem and neck pain.   Skin:  Negative for rash and wound.   Neurological:  Negative for dizziness, syncope, speech difficulty and light-headedness.   Psychiatric/Behavioral:  Negative for agitation and confusion. The patient is not nervous/anxious.    Objective:     Vital Signs (Most Recent):  Temp: 98.2 °F (36.8 °C) (01/05/23 1527)  Pulse: 70 (01/05/23 1527)  Resp: 18 (01/05/23 1527)  BP: (!) 94/53 (01/05/23 1527)  SpO2: (!) 93 % (01/05/23 1748)   Vital Signs (24h Range):  Temp:  [96.6 °F (35.9 °C)-98.2 °F (36.8 °C)] 98.2 °F (36.8 °C)  Pulse:  [67-97] 70  Resp:  [18-20] 18  SpO2:  [90 %-96 %] 93 %  BP: ()/(53-70) 94/53     Weight: 73.7 kg (162 lb 7.7 oz)  Body mass index is 30.7 kg/m².    Intake/Output Summary (Last 24 hours) at 1/5/2023 1950  Last data filed at 1/5/2023 1746  Gross per 24 hour   Intake 628.99  ml   Output --   Net 628.99 ml      Physical Exam  Vitals and nursing note reviewed.   Constitutional:       General: She is not in acute distress.     Appearance: She is well-developed.   HENT:      Head: Normocephalic and atraumatic.      Nose: No congestion.      Mouth/Throat:      Pharynx: No oropharyngeal exudate.   Eyes:      Conjunctiva/sclera: Conjunctivae normal.      Pupils: Pupils are equal, round, and reactive to light.   Cardiovascular:      Rate and Rhythm: Normal rate and regular rhythm.      Heart sounds: Normal heart sounds.   Pulmonary:      Effort: Pulmonary effort is normal. No respiratory distress.      Breath sounds: Rhonchi (bilteral lower lung fields) present. No wheezing.   Abdominal:      General: Bowel sounds are normal. There is no distension.      Palpations: Abdomen is soft.      Tenderness: There is no abdominal tenderness.   Musculoskeletal:         General: No tenderness. Normal range of motion.      Cervical back: Normal range of motion and neck supple.   Lymphadenopathy:      Cervical: No cervical adenopathy.   Skin:     General: Skin is warm and dry.      Capillary Refill: Capillary refill takes less than 2 seconds.      Findings: No rash.   Neurological:      Mental Status: She is alert and oriented to person, place, and time.      Cranial Nerves: No cranial nerve deficit.      Sensory: No sensory deficit.      Coordination: Coordination normal.   Psychiatric:         Behavior: Behavior normal.         Thought Content: Thought content normal.         Judgment: Judgment normal.       Significant Labs: All pertinent labs within the past 24 hours have been reviewed.  CBC:   Recent Labs   Lab 01/03/23  2103 01/04/23  0436 01/05/23  0509   WBC 15.86* 13.64* 10.40   HGB 13.0 11.5* 11.3*   HCT 42.6 37.6 36.6*   * 618* 684*     CMP:   Recent Labs   Lab 01/03/23  2220 01/04/23  0436 01/05/23  0509    136 137   K 3.9 3.7 3.5    104 106   CO2 23 19* 21*    107 93    BUN 9 8 9   CREATININE 0.7 0.7 0.7   CALCIUM 9.6 9.1 9.2   PROT  --  7.6 7.2   ALBUMIN  --  3.1* 3.0*   BILITOT  --  0.2 0.1   ALKPHOS  --  133 140*   AST  --  23 19   ALT  --  17 21   ANIONGAP 11 13 10     Cardiac Markers:   Recent Labs   Lab 01/03/23  2103   BNP 12       Significant Imaging: I have reviewed all pertinent imaging results/findings within the past 24 hours.    CTA Chest Non-Coronary (PE Studies)  Narrative: EXAMINATION:  CTA CHEST NON CORONARY (PE STUDIES)    CLINICAL HISTORY:  Pulmonary embolism (PE) suspected, high prob;    TECHNIQUE:  Low dose axial images, sagittal and coronal reformations were obtained from the thoracic inlet to the lung bases following the IV administration of 75 mL of Omnipaque 350.  Contrast timing was optimized to evaluate the pulmonary arteries.  MIP images were performed.    COMPARISON:  Chest radiograph, 01/03/2023 and 12/22/2022.    FINDINGS:  Examination of the soft tissue and vascular structures at the base of the neck is negative for acute finding.  There is a 1 cm hypodensity in the left lobe of the thyroid, too small for dedicated follow-up.    The thoracic aorta maintains normal caliber, contour, and course without significant atherosclerotic calcification.  There is no evidence of aneurysmal dilation or dissection.    The pulmonary arteries distribute normally without evidence of filling defect to indicate pulmonary thromboembolism.    The trachea and proximal airways are patent.    The lungs are symmetrically expanded and there is diffuse peribronchial wall thickening with prominent interstitial and tree-in-bud opacities throughout the inferior aspect of the bilateral upper lobes, lingula, right middle lobe, and bilateral lower lobes.  Few smaller areas of patchy airspace disease in the bilateral inferior lower lobes and inferior aspect of the right middle lobe.  Mucous plugging versus retained secretions in the bilateral lower lobes and right middle lobe.  No  evidence of pleural fluid.  No pneumothorax.    The heart is not enlarged.  No pericardial effusion.    There is no axillary, mediastinal, or hilar lymph node enlargement.    The esophagus maintains a normal course and caliber.    Limited images of the upper abdomen obtained during the course of this dedicated thoracic CT is negative for acute findings.    The osseous structures are negative for acute finding or aggressive osseous lesion.  Incidentally noted vacuum phenomena in the bilateral shoulder joints.  Impression: No evidence of acute pulmonary embolus to the proximal segmental level.    Findings suggestive of bronchopneumonia or other infectious/inflammatory process most pronounced in the bilateral lower lobes as described in the body of the report.    Electronically signed by: Caden Gee MD  Date:    01/03/2023  Time:    23:12  X-Ray Chest PA And Lateral  Narrative: EXAMINATION:  XR CHEST PA AND LATERAL    CLINICAL HISTORY:  Cough, unspecified    TECHNIQUE:  PA and lateral views of the chest were performed.    COMPARISON:  Chest radiograph December 22, 2022    FINDINGS:  Two views of the chest are submitted.  The appearance of the cardiomediastinal silhouette is appropriate.  Mild accentuated pulmonary opacity the lung bases may relate to mild basilar infiltrate without dense consolidation.  There is no evidence for pleural effusion or pneumothorax, the osseous structures appear intact.  Impression: Suspected mild bibasilar infiltrate as above.    Electronically signed by: Aurelio Lux  Date:    01/03/2023  Time:    22:07

## 2023-01-06 NOTE — ASSESSMENT & PLAN NOTE
Patient presents to the ED for worsening SOB with a dry cough since 12/19. Has received treatment for bronchitis and the flu without relief.   Leukocytosis  - Afebrile, WBC 15K and downtrending   - 95% on 2L, wean as tolerated  - CTA showed no evidence of acute pulmonary embolus to the proximal segmental level. Findings suggestive of bronchopneumonia or other infectious/inflammatory process most pronounced in the bilateral lower lobes as described in the body of the report.  - continue azirtho/rocephin x 5 days  - Check respiratory cultures  - Duonebs q6h while awake and prn with IS  - Guaifenesin and Tessalon Perles prn cough

## 2023-01-06 NOTE — ASSESSMENT & PLAN NOTE
Patient with Hypoxic Respiratory failure which is Acute.  She is not on home oxygen. Supplemental oxygen was provided and noted-  .   Signs/symptoms of respiratory failure include- tachypnea. Contributing diagnoses includes - Pneumonia Labs and images were reviewed. Patient Has not had a recent ABG. Will treat underlying causes and adjust management of respiratory failure as follows    Pneumonia  Patient presents to the ED for worsening SOB with a dry cough since 12/19. Has received treatment for bronchitis and the flu without relief.   Leukocytosis  - Afebrile, WBC 15K and downtrending   - 95% on 2L, wean as tolerated  - CTA showed no evidence of acute pulmonary embolus to the proximal segmental level. Findings suggestive of bronchopneumonia or other infectious/inflammatory process most pronounced in the bilateral lower lobes as described in the body of the report.  - continue azirtho/rocephin x 5 days  - Check respiratory cultures  - Duonebs q6h while awake and prn with IS  - Guaifenesin and Tessalon Perles prn cough  - Attempt to wean O2 as tolerated  - Plan for 6MW tomorrow

## 2023-01-07 VITALS
TEMPERATURE: 97 F | HEART RATE: 78 BPM | WEIGHT: 162.5 LBS | DIASTOLIC BLOOD PRESSURE: 74 MMHG | HEIGHT: 61 IN | SYSTOLIC BLOOD PRESSURE: 126 MMHG | BODY MASS INDEX: 30.68 KG/M2 | OXYGEN SATURATION: 93 % | RESPIRATION RATE: 20 BRPM

## 2023-01-07 PROCEDURE — 25000003 PHARM REV CODE 250

## 2023-01-07 PROCEDURE — 25000003 PHARM REV CODE 250: Performed by: HOSPITALIST

## 2023-01-07 PROCEDURE — 63600175 PHARM REV CODE 636 W HCPCS

## 2023-01-07 PROCEDURE — 99900035 HC TECH TIME PER 15 MIN (STAT)

## 2023-01-07 PROCEDURE — 25000242 PHARM REV CODE 250 ALT 637 W/ HCPCS

## 2023-01-07 PROCEDURE — 99239 PR HOSPITAL DISCHARGE DAY,>30 MIN: ICD-10-PCS | Mod: ,,,

## 2023-01-07 PROCEDURE — 94761 N-INVAS EAR/PLS OXIMETRY MLT: CPT

## 2023-01-07 PROCEDURE — 99239 HOSP IP/OBS DSCHRG MGMT >30: CPT | Mod: ,,,

## 2023-01-07 PROCEDURE — 63700000 PHARM REV CODE 250 ALT 637 W/O HCPCS

## 2023-01-07 PROCEDURE — 94640 AIRWAY INHALATION TREATMENT: CPT

## 2023-01-07 RX ORDER — GUAIFENESIN 600 MG/1
600 TABLET, EXTENDED RELEASE ORAL 2 TIMES DAILY
Qty: 10 TABLET | Refills: 0 | Status: SHIPPED | OUTPATIENT
Start: 2023-01-07 | End: 2023-01-07 | Stop reason: SDUPTHER

## 2023-01-07 RX ORDER — METHOCARBAMOL 750 MG/1
750 TABLET, FILM COATED ORAL 4 TIMES DAILY
Qty: 20 TABLET | Refills: 0 | Status: SHIPPED | OUTPATIENT
Start: 2023-01-07 | End: 2023-01-07 | Stop reason: SDUPTHER

## 2023-01-07 RX ORDER — METHOCARBAMOL 750 MG/1
750 TABLET, FILM COATED ORAL 4 TIMES DAILY
Qty: 20 TABLET | Refills: 0 | Status: SHIPPED | OUTPATIENT
Start: 2023-01-07 | End: 2023-01-12

## 2023-01-07 RX ORDER — LIDOCAINE 50 MG/G
2 PATCH TOPICAL DAILY
Qty: 15 PATCH | Refills: 0 | Status: SHIPPED | OUTPATIENT
Start: 2023-01-07 | End: 2023-01-07 | Stop reason: SDUPTHER

## 2023-01-07 RX ORDER — BENZONATATE 100 MG/1
100 CAPSULE ORAL 3 TIMES DAILY
Qty: 15 CAPSULE | Refills: 0 | Status: SHIPPED | OUTPATIENT
Start: 2023-01-07 | End: 2023-01-07 | Stop reason: SDUPTHER

## 2023-01-07 RX ORDER — LIDOCAINE 50 MG/G
2 PATCH TOPICAL DAILY
Qty: 15 PATCH | Refills: 0 | Status: SHIPPED | OUTPATIENT
Start: 2023-01-07 | End: 2023-01-15

## 2023-01-07 RX ORDER — BENZONATATE 100 MG/1
100 CAPSULE ORAL 3 TIMES DAILY
Qty: 15 CAPSULE | Refills: 0 | Status: SHIPPED | OUTPATIENT
Start: 2023-01-07 | End: 2023-01-12

## 2023-01-07 RX ORDER — FLUTICASONE PROPIONATE 50 MCG
2 SPRAY, SUSPENSION (ML) NASAL DAILY
Qty: 9.9 ML | Refills: 0 | Status: SHIPPED | OUTPATIENT
Start: 2023-01-08 | End: 2023-01-07 | Stop reason: SDUPTHER

## 2023-01-07 RX ORDER — GUAIFENESIN 600 MG/1
600 TABLET, EXTENDED RELEASE ORAL 2 TIMES DAILY
Qty: 10 TABLET | Refills: 0 | Status: SHIPPED | OUTPATIENT
Start: 2023-01-07 | End: 2023-01-12

## 2023-01-07 RX ORDER — FLUTICASONE PROPIONATE 50 MCG
2 SPRAY, SUSPENSION (ML) NASAL DAILY
Qty: 9.9 ML | Refills: 0 | Status: SHIPPED | OUTPATIENT
Start: 2023-01-08 | End: 2023-06-04

## 2023-01-07 RX ADMIN — ACETAMINOPHEN 1000 MG: 500 TABLET ORAL at 01:01

## 2023-01-07 RX ADMIN — GUAIFENESIN 600 MG: 600 TABLET, EXTENDED RELEASE ORAL at 09:01

## 2023-01-07 RX ADMIN — IPRATROPIUM BROMIDE AND ALBUTEROL SULFATE 3 ML: .5; 3 SOLUTION RESPIRATORY (INHALATION) at 08:01

## 2023-01-07 RX ADMIN — METHOCARBAMOL 750 MG: 750 TABLET ORAL at 01:01

## 2023-01-07 RX ADMIN — BENZONATATE 100 MG: 100 CAPSULE ORAL at 09:01

## 2023-01-07 RX ADMIN — ACETAMINOPHEN 1000 MG: 500 TABLET ORAL at 05:01

## 2023-01-07 RX ADMIN — FLUTICASONE PROPIONATE 100 MCG: 50 SPRAY, METERED NASAL at 09:01

## 2023-01-07 RX ADMIN — PROPRANOLOL HYDROCHLORIDE 120 MG: 40 TABLET ORAL at 09:01

## 2023-01-07 RX ADMIN — CEFTRIAXONE 2 G: 2 INJECTION, POWDER, FOR SOLUTION INTRAMUSCULAR; INTRAVENOUS at 10:01

## 2023-01-07 RX ADMIN — AZITHROMYCIN MONOHYDRATE 250 MG: 250 TABLET ORAL at 09:01

## 2023-01-07 RX ADMIN — CETIRIZINE HYDROCHLORIDE 10 MG: 10 TABLET, FILM COATED ORAL at 09:01

## 2023-01-07 RX ADMIN — LIDOCAINE 2 PATCH: 50 PATCH CUTANEOUS at 01:01

## 2023-01-07 RX ADMIN — CARBAMAZEPINE 600 MG: 200 TABLET, EXTENDED RELEASE ORAL at 09:01

## 2023-01-07 RX ADMIN — METHOCARBAMOL 750 MG: 750 TABLET ORAL at 09:01

## 2023-01-07 NOTE — NURSING
Patient alert and oriented x 4. Denies any pain or discomfort. Denies any respiratory distress. Patient slept well. Safety measures maintain. Bed in lowest position and call light within reach.   Transition Record Discharge Summary    Patient: Sixto Bills   MRN: [id number]  : 1986     Brief Description/Reason for Admission:   Sixto Bills is a 34 year old male with no known past psychiatric history who on 21 presented to [facility] because of increased agitation and not sleeping for the past week. Patient is unsure of what is going on and unsure of how long this has all been happening to him.    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   Yes, a prescription for an FDA-approved tobacco cessation medication was given to the patient at discharge.    Was a prescription for an FDA-approved alcohol or drug disorder medication was given to the patient at discharge?    No, not applicable since the patient does not have a diagnosis of alcohol or drug disorder and does not have unhealthy alcohol use    Refusal of Lipid Panel  Not applicable as patient had Lipid panel completed during stay or documentation of lipid panel results within the past year has been documented within this encounter

## 2023-01-07 NOTE — ASSESSMENT & PLAN NOTE
- L lateral chest wall pain, worse with deep breathing, palpation  - Scheduled tylenol, lidocaine patches  - Increased home robaxin   - Pt with significant improvement in chest wall pain   - Recommended continuing tylenol, lidocaine patches, and robaxin until PCP follow-up

## 2023-01-07 NOTE — ASSESSMENT & PLAN NOTE
Patient with Hypoxic Respiratory failure which is Acute.  She is not on home oxygen. Supplemental oxygen was provided and noted-  .   Signs/symptoms of respiratory failure include- tachypnea. Contributing diagnoses includes - Pneumonia Labs and images were reviewed. Patient Has not had a recent ABG. Will treat underlying causes and adjust management of respiratory failure as follows    Pneumonia  Patient presents to the ED for worsening SOB with a dry cough since 12/19. Has received treatment for bronchitis and the flu without relief.   Leukocytosis  - Afebrile, WBC 15K and downtrending   - 95% on 2L, wean as tolerated  - CTA showed no evidence of acute pulmonary embolus to the proximal segmental level. Findings suggestive of bronchopneumonia or other infectious/inflammatory process most pronounced in the bilateral lower lobes as described in the body of the report.  - continue azirtho/rocephin x 5 days  - Check respiratory cultures  - Duonebs q4h while awake and prn with IS  - Guaifenesin and Tessalon Perles prn cough  - Attempt to wean O2 as tolerated  - Failed 6MW today   - Add chest physiotherapy

## 2023-01-07 NOTE — ASSESSMENT & PLAN NOTE
Patient with Hypoxic Respiratory failure which is Acute.  She is not on home oxygen. Supplemental oxygen was provided and noted-  .   Signs/symptoms of respiratory failure include- tachypnea. Contributing diagnoses includes - Pneumonia Labs and images were reviewed. Patient Has not had a recent ABG. Will treat underlying causes and adjust management of respiratory failure as follows    Pneumonia  Patient presents to the ED for worsening SOB with a dry cough since 12/19. Has received treatment for bronchitis and the flu without relief.   Leukocytosis  - Afebrile, WBC initially 15K and resolved   - CTA showed no evidence of acute pulmonary embolus to the proximal segmental level. Findings suggestive of bronchopneumonia or other infectious/inflammatory process most pronounced in the bilateral lower lobes as described in the body of the report.  - completed azirtho/rocephin x 5 days  - respiratory cultures with no growth  - Duonebs q4h while awake and prn with IS  - Guaifenesin and Tessalon Perles prn cough  - Failed 6MW on 1/6  - Added chest physiotherapy  - Passed six minute walk test today & patient reports significant improvement in symptoms with only mild SOB with exertion  - PCP follow-up 1/9/23

## 2023-01-07 NOTE — PROGRESS NOTES
[facility] ([address] Medicine  Progress Note    Patient Name: Marilu Frausto  MRN: [id number]  Patient Class: IP- Inpatient   Admission Date: 1/3/2023  Length of Stay: 2 days  Attending Physician: Mana Meneses MD  Primary Care Provider: Amrik Owen MD        Subjective:     Principal Problem:Acute respiratory failure with hypoxia        HPI:  Marilu Frausto is a 42 y.o. female with a hx of seizures and migraines presents to the ED for increased shortness of breath since 12/19. Patient states she was diagnosed with bronchitis on 12/19 and was sent home with steroids which provided no relief. She was then seen on the 22th and was found to have the flu with associated hypoxia. Finished course of tamiflu without relief of symptoms.Upon discharge from that hospital stay patient endorsed O2 sats at home remained around 90% and felt that she was SOB with only a minor amount of exertion. States she is unable to complete simple activites at home with SOB. Patient also endorses having to sleep upright on couch because she is unable to breath while laying flat. Endorses left sided chest pain with coughing, dry cough and subjective fevers at home. Denies abdominal pain, constipation/ diarrhea and urinary symptoms.     ED: AF, VSS. WBC 15K. CMP unremarkable. Tn neg. COVID/ flu neg. EKG NSR w/o ischemic changes. CXR showed suspected mild bibasilar. CTA Chest showed no evidence of acute pulmonary embolus to the proximal segmental level. Findings suggestive of bronchopneumonia or other infectious/inflammatory process most pronounced in the bilateral lower lobes as described in the body of the report. Give duo nebs, azithromycin and rocephin in the ED.      Overview/Hospital Course:  Pt placed in observation for management of acute hypoxic respiratory failure 2/2 pneumonia. Pt receiving scheduled duonebs, azithromycin, rocephin and supportive care. Plan to discharge home when stable.        Interval History: Pt seen and examined by me this morning. Mildly hypotensive otherwise VSSAF. NAEON. Pt reports feeling somewhat improved since admission but consistent with how she felt yesterday. Pt reports SOB constant and cough somewhat more productive compared to previous. Pt sating 93% on 1L this am. Attempted six minute walk and patient desaturated to 88% with trying to put shoes on. Pt also reports continued sensitivity to L lateral chest wall, worse with palpation and deep breathing.     Review of Systems   Constitutional:  Negative for activity change, chills and fever.   HENT:  Positive for congestion. Negative for trouble swallowing.    Eyes:  Negative for photophobia and visual disturbance.   Respiratory:  Positive for cough and shortness of breath. Negative for chest tightness.    Cardiovascular:  Positive for chest pain (2/2 cough). Negative for palpitations and leg swelling.   Gastrointestinal:  Negative for abdominal pain, constipation, diarrhea, nausea and vomiting.   Genitourinary:  Negative for dysuria, frequency, hematuria and vaginal bleeding.   Musculoskeletal:  Negative for back pain, gait problem and neck pain.   Skin:  Negative for rash and wound.   Neurological:  Negative for dizziness, syncope, speech difficulty and light-headedness.   Psychiatric/Behavioral:  Negative for agitation and confusion. The patient is not nervous/anxious.    Objective:     Vital Signs (Most Recent):  Temp: 97.7 °F (36.5 °C) (01/06/23 1632)  Pulse: 74 (01/06/23 1632)  Resp: 20 (01/06/23 1632)  BP: (!) 99/56 (01/06/23 1632)  SpO2: (!) 93 % (01/06/23 1641)   Vital Signs (24h Range):  Temp:  [97.7 °F (36.5 °C)-98.1 °F (36.7 °C)] 97.7 °F (36.5 °C)  Pulse:  [72-86] 74  Resp:  [17-20] 20  SpO2:  [91 %-98 %] 93 %  BP: ()/(54-71) 99/56     Weight: 73.7 kg (162 lb 7.7 oz)  Body mass index is 30.7 kg/m².    Intake/Output Summary (Last 24 hours) at 1/6/2023 4519  Last data filed at 1/6/2023 1706  Gross per 24  hour   Intake 634.53 ml   Output --   Net 634.53 ml        Physical Exam  Vitals and nursing note reviewed.   Constitutional:       General: She is not in acute distress.     Appearance: She is well-developed.   HENT:      Head: Normocephalic and atraumatic.      Nose: No congestion.      Mouth/Throat:      Pharynx: No oropharyngeal exudate.   Eyes:      Conjunctiva/sclera: Conjunctivae normal.      Pupils: Pupils are equal, round, and reactive to light.   Cardiovascular:      Rate and Rhythm: Normal rate and regular rhythm.      Heart sounds: Normal heart sounds.   Pulmonary:      Effort: Pulmonary effort is normal. No respiratory distress.      Breath sounds: Rhonchi (bilteral lower lung fields) present. No wheezing.   Abdominal:      General: Bowel sounds are normal. There is no distension.      Palpations: Abdomen is soft.      Tenderness: There is no abdominal tenderness.   Musculoskeletal:         General: No tenderness. Normal range of motion.      Cervical back: Normal range of motion and neck supple.   Lymphadenopathy:      Cervical: No cervical adenopathy.   Skin:     General: Skin is warm and dry.      Capillary Refill: Capillary refill takes less than 2 seconds.      Findings: No rash.   Neurological:      Mental Status: She is alert and oriented to person, place, and time.      Cranial Nerves: No cranial nerve deficit.      Sensory: No sensory deficit.      Coordination: Coordination normal.   Psychiatric:         Behavior: Behavior normal.         Thought Content: Thought content normal.         Judgment: Judgment normal.       Significant Labs: All pertinent labs within the past 24 hours have been reviewed.  CBC:   Recent Labs   Lab 01/05/23  0509 01/06/23  0242   WBC 10.40 11.80   HGB 11.3* 11.9*   HCT 36.6* 39.7   * 659*       CMP:   Recent Labs   Lab 01/05/23  0509 01/06/23  0242    139   K 3.5 3.9    107   CO2 21* 19*   GLU 93 98   BUN 9 8   CREATININE 0.7 0.7   CALCIUM 9.2 9.2    PROT 7.2 7.2   ALBUMIN 3.0* 3.1*   BILITOT 0.1 0.1   ALKPHOS 140* 143*   AST 19 22   ALT 21 23   ANIONGAP 10 13       Cardiac Markers:   No results for input(s): CKMB, MYOGLOBIN, BNP, TROPISTAT in the last 48 hours.      Significant Imaging: I have reviewed all pertinent imaging results/findings within the past 24 hours.    CTA Chest Non-Coronary (PE Studies)  Narrative: EXAMINATION:  CTA CHEST NON CORONARY (PE STUDIES)    CLINICAL HISTORY:  Pulmonary embolism (PE) suspected, high prob;    TECHNIQUE:  Low dose axial images, sagittal and coronal reformations were obtained from the thoracic inlet to the lung bases following the IV administration of 75 mL of Omnipaque 350.  Contrast timing was optimized to evaluate the pulmonary arteries.  MIP images were performed.    COMPARISON:  Chest radiograph, 01/03/2023 and 12/22/2022.    FINDINGS:  Examination of the soft tissue and vascular structures at the base of the neck is negative for acute finding.  There is a 1 cm hypodensity in the left lobe of the thyroid, too small for dedicated follow-up.    The thoracic aorta maintains normal caliber, contour, and course without significant atherosclerotic calcification.  There is no evidence of aneurysmal dilation or dissection.    The pulmonary arteries distribute normally without evidence of filling defect to indicate pulmonary thromboembolism.    The trachea and proximal airways are patent.    The lungs are symmetrically expanded and there is diffuse peribronchial wall thickening with prominent interstitial and tree-in-bud opacities throughout the inferior aspect of the bilateral upper lobes, lingula, right middle lobe, and bilateral lower lobes.  Few smaller areas of patchy airspace disease in the bilateral inferior lower lobes and inferior aspect of the right middle lobe.  Mucous plugging versus retained secretions in the bilateral lower lobes and right middle lobe.  No evidence of pleural fluid.  No pneumothorax.    The  heart is not enlarged.  No pericardial effusion.    There is no axillary, mediastinal, or hilar lymph node enlargement.    The esophagus maintains a normal course and caliber.    Limited images of the upper abdomen obtained during the course of this dedicated thoracic CT is negative for acute findings.    The osseous structures are negative for acute finding or aggressive osseous lesion.  Incidentally noted vacuum phenomena in the bilateral shoulder joints.  Impression: No evidence of acute pulmonary embolus to the proximal segmental level.    Findings suggestive of bronchopneumonia or other infectious/inflammatory process most pronounced in the bilateral lower lobes as described in the body of the report.    Electronically signed by: Caden Gee MD  Date:    01/03/2023  Time:    23:12  X-Ray Chest PA And Lateral  Narrative: EXAMINATION:  XR CHEST PA AND LATERAL    CLINICAL HISTORY:  Cough, unspecified    TECHNIQUE:  PA and lateral views of the chest were performed.    COMPARISON:  Chest radiograph December 22, 2022    FINDINGS:  Two views of the chest are submitted.  The appearance of the cardiomediastinal silhouette is appropriate.  Mild accentuated pulmonary opacity the lung bases may relate to mild basilar infiltrate without dense consolidation.  There is no evidence for pleural effusion or pneumothorax, the osseous structures appear intact.  Impression: Suspected mild bibasilar infiltrate as above.    Electronically signed by: Aurelio Lux  Date:    01/03/2023  Time:    22:07         Assessment/Plan:      * Acute respiratory failure with hypoxia  Patient with Hypoxic Respiratory failure which is Acute.  She is not on home oxygen. Supplemental oxygen was provided and noted-  .   Signs/symptoms of respiratory failure include- tachypnea. Contributing diagnoses includes - Pneumonia Labs and images were reviewed. Patient Has not had a recent ABG. Will treat underlying causes and adjust management of  respiratory failure as follows    Pneumonia  Patient presents to the ED for worsening SOB with a dry cough since 12/19. Has received treatment for bronchitis and the flu without relief.   Leukocytosis  - Afebrile, WBC 15K and downtrending   - 95% on 2L, wean as tolerated  - CTA showed no evidence of acute pulmonary embolus to the proximal segmental level. Findings suggestive of bronchopneumonia or other infectious/inflammatory process most pronounced in the bilateral lower lobes as described in the body of the report.  - continue azirtho/rocephin x 5 days  - Check respiratory cultures  - Duonebs q4h while awake and prn with IS  - Guaifenesin and Tessalon Perles prn cough  - Attempt to wean O2 as tolerated  - Failed 6MW today   - Add chest physiotherapy    Chest wall pain  - L lateral chest wall pain, worse with deep breathing, palpation  - Scheduled tylenol, lidocaine patches  - Increase home robaxin     Hypotension  - Likely 2/2 dehydration  - s/p 500 cc LR bolus   - Encourage PO hydration    Epilepsy  - continue home tegretol    Migraine without status migrainosus, not intractable  - continue home propanolol     VTE Risk Mitigation (From admission, onward)         Ordered     enoxaparin injection 40 mg  Daily         01/03/23 2350     IP VTE HIGH RISK PATIENT  Once         01/03/23 2350                Discharge Planning   AVA: 1/7/2023     Code Status: Full Code   Is the patient medically ready for discharge?: No    Reason for patient still in hospital (select all that apply): Patient trending condition and Treatment  Discharge Plan A: Home with family                  Yoli Wolfe PA-C  Department of Hospital Medicine   [facility] - Intensive Care ([facility]-)

## 2023-01-07 NOTE — ASSESSMENT & PLAN NOTE
- L lateral chest wall pain, worse with deep breathing, palpation  - Scheduled tylenol, lidocaine patches  - Increase home robaxin

## 2023-01-07 NOTE — NURSING

## 2023-01-08 NOTE — DISCHARGE SUMMARY
[facility] ([place] [address])  [facility] Medicine  Discharge Summary      Patient Name: Marilu Frausto  MRN: [id number]  ALIREZA: [id number]  Patient Class: IP- Inpatient  Admission Date: 1/3/2023  Hospital Length of Stay: 3 days  Discharge Date and Time: 1/7/2023  6:04 PM  Attending Physician: Vivian att. providers found   Discharging Provider: Yoli Wolfe PA-C  Primary Care Provider: Amrik Owen MD  [facility] Medicine Team: [facility] HOSP MED Y Yoli Wolfe PA-C  Primary Care Team: [facility] MED Y    HPI:   Marilu Frausto is a 42 y.o. female with a hx of seizures and migraines presents to the ED for increased shortness of breath since 12/19. Patient states she was diagnosed with bronchitis on 12/19 and was sent home with steroids which provided no relief. She was then seen on the 22th and was found to have the flu with associated hypoxia. Finished course of tamiflu without relief of symptoms.Upon discharge from that hospital stay patient endorsed O2 sats at home remained around 90% and felt that she was SOB with only a minor amount of exertion. States she is unable to complete simple activites at home with SOB. Patient also endorses having to sleep upright on couch because she is unable to breath while laying flat. Endorses left sided chest pain with coughing, dry cough and subjective fevers at home. Denies abdominal pain, constipation/ diarrhea and urinary symptoms.     ED: AF, VSS. WBC 15K. CMP unremarkable. Tn neg. COVID/ flu neg. EKG NSR w/o ischemic changes. CXR showed suspected mild bibasilar. CTA Chest showed no evidence of acute pulmonary embolus to the proximal segmental level. Findings suggestive of bronchopneumonia or other infectious/inflammatory process most pronounced in the bilateral lower lobes as described in the body of the report. Give duo nebs, azithromycin and rocephin in the ED.      * No surgery found *      Hospital Course:   Pt placed in observation for management of acute hypoxic  respiratory failure 2/2 pneumonia. Pt received scheduled duonebs, azithromycin, rocephin and supportive care. Pt has passed six minute walk test and reports significant improvement in symptoms s/p 5 days of rocephin and azithromycin and supportive care. All questions were answered. Patient acknowledged understanding of discharge instructions and feels safe to discharge home. Patient was discharged on 1/7/2023 in stable condition with PCP follow-up on 1/9/23.        Goals of Care Treatment Preferences:  Code Status: Full Code      Consults:     * Acute respiratory failure with hypoxia  Patient with Hypoxic Respiratory failure which is Acute.  She is not on home oxygen. Supplemental oxygen was provided and noted-  .   Signs/symptoms of respiratory failure include- tachypnea. Contributing diagnoses includes - Pneumonia Labs and images were reviewed. Patient Has not had a recent ABG. Will treat underlying causes and adjust management of respiratory failure as follows    Pneumonia  Patient presents to the ED for worsening SOB with a dry cough since 12/19. Has received treatment for bronchitis and the flu without relief.   Leukocytosis  - Afebrile, WBC initially 15K and resolved   - CTA showed no evidence of acute pulmonary embolus to the proximal segmental level. Findings suggestive of bronchopneumonia or other infectious/inflammatory process most pronounced in the bilateral lower lobes as described in the body of the report.  - completed azirtho/rocephin x 5 days  - respiratory cultures with no growth  - Duonebs q4h while awake and prn with IS  - Guaifenesin and Tessalon Perles prn cough  - Failed 6MW on 1/6  - Added chest physiotherapy  - Passed six minute walk test today & patient reports significant improvement in symptoms with only mild SOB with exertion  - PCP follow-up 1/9/23    Chest wall pain  - L lateral chest wall pain, worse with deep breathing, palpation  - Scheduled tylenol, lidocaine patches  - Increased  home robaxin   - Pt with significant improvement in chest wall pain   - Recommended continuing tylenol, lidocaine patches, and robaxin until PCP follow-up      Final Active Diagnoses:    Diagnosis Date Noted POA    PRINCIPAL PROBLEM:  Acute respiratory failure with hypoxia [J96.01] 12/27/2022 Yes    Chest wall pain [R07.89] 01/06/2023 Yes    Hypotension [I95.9] 01/05/2023 Yes    Leukocytosis [D72.829] 01/04/2023 Yes    Pneumonia [J18.9] 01/04/2023 Yes    Migraine without status migrainosus, not intractable [G43.909] 12/22/2021 Yes    Epilepsy [G40.909] 12/22/2021 Yes      Problems Resolved During this Admission:       Discharged Condition: good    Disposition: Home or Self Care    Follow Up:    Patient Instructions:      Notify your health care provider if you experience any of the following:  temperature >100.4     Notify your health care provider if you experience any of the following:  severe uncontrolled pain     Notify your health care provider if you experience any of the following:  difficulty breathing or increased cough     Activity as tolerated       Significant Diagnostic Studies: Labs:   BMP:   Recent Labs   Lab 01/06/23  0242   GLU 98      K 3.9      CO2 19*   BUN 8   CREATININE 0.7   CALCIUM 9.2   MG 2.1   , CBC   Recent Labs   Lab 01/06/23  0242   WBC 11.80   HGB 11.9*   HCT 39.7   *   , Troponin   Recent Labs   Lab 01/03/23 2220   TROPONINI <0.006    and All labs within the past 24 hours have been reviewed    CTA Chest Non-Coronary (PE Studies)  Narrative: EXAMINATION:  CTA CHEST NON CORONARY (PE STUDIES)    CLINICAL HISTORY:  Pulmonary embolism (PE) suspected, high prob;    TECHNIQUE:  Low dose axial images, sagittal and coronal reformations were obtained from the thoracic inlet to the lung bases following the IV administration of 75 mL of Omnipaque 350.  Contrast timing was optimized to evaluate the pulmonary arteries.  MIP images were performed.    COMPARISON:  Chest  radiograph, 01/03/2023 and 12/22/2022.    FINDINGS:  Examination of the soft tissue and vascular structures at the base of the neck is negative for acute finding.  There is a 1 cm hypodensity in the left lobe of the thyroid, too small for dedicated follow-up.    The thoracic aorta maintains normal caliber, contour, and course without significant atherosclerotic calcification.  There is no evidence of aneurysmal dilation or dissection.    The pulmonary arteries distribute normally without evidence of filling defect to indicate pulmonary thromboembolism.    The trachea and proximal airways are patent.    The lungs are symmetrically expanded and there is diffuse peribronchial wall thickening with prominent interstitial and tree-in-bud opacities throughout the inferior aspect of the bilateral upper lobes, lingula, right middle lobe, and bilateral lower lobes.  Few smaller areas of patchy airspace disease in the bilateral inferior lower lobes and inferior aspect of the right middle lobe.  Mucous plugging versus retained secretions in the bilateral lower lobes and right middle lobe.  No evidence of pleural fluid.  No pneumothorax.    The heart is not enlarged.  No pericardial effusion.    There is no axillary, mediastinal, or hilar lymph node enlargement.    The esophagus maintains a normal course and caliber.    Limited images of the upper abdomen obtained during the course of this dedicated thoracic CT is negative for acute findings.    The osseous structures are negative for acute finding or aggressive osseous lesion.  Incidentally noted vacuum phenomena in the bilateral shoulder joints.  Impression: No evidence of acute pulmonary embolus to the proximal segmental level.    Findings suggestive of bronchopneumonia or other infectious/inflammatory process most pronounced in the bilateral lower lobes as described in the body of the report.    Electronically signed by: Caden Gee  MD  Date:    01/03/2023  Time:    23:12  X-Ray Chest PA And Lateral  Narrative: EXAMINATION:  XR CHEST PA AND LATERAL    CLINICAL HISTORY:  Cough, unspecified    TECHNIQUE:  PA and lateral views of the chest were performed.    COMPARISON:  Chest radiograph December 22, 2022    FINDINGS:  Two views of the chest are submitted.  The appearance of the cardiomediastinal silhouette is appropriate.  Mild accentuated pulmonary opacity the lung bases may relate to mild basilar infiltrate without dense consolidation.  There is no evidence for pleural effusion or pneumothorax, the osseous structures appear intact.  Impression: Suspected mild bibasilar infiltrate as above.    Electronically signed by: Aurelio Lux  Date:    01/03/2023  Time:    22:07      Pending Diagnostic Studies:     None         Medications:  Reconciled Home Medications:      Medication List      START taking these medications    benzonatate 100 MG capsule  Commonly known as: TESSALON  Take 1 capsule (100 mg total) by mouth 3 (three) times daily. for 5 days     fluticasone propionate 50 mcg/actuation nasal spray  Commonly known as: FLONASE  2 sprays (100 mcg total) by Each Nostril route once daily.  Start taking on: January 8, 2023     guaiFENesin 600 mg 12 hr tablet  Commonly known as: MUCINEX  Take 1 tablet (600 mg total) by mouth 2 (two) times daily. for 5 days     LIDOcaine 5 %  Commonly known as: LIDODERM  Place 2 patches onto the skin once daily. Remove & Discard patch within 12 hours for 8 days     methocarbamoL 750 MG Tab  Commonly known as: ROBAXIN  Take 1 tablet (750 mg total) by mouth 4 (four) times daily. for 5 days        CONTINUE taking these medications    INNOPRAN  mg Cp24  Generic drug: propranoloL  TK 1 C PO QAM     loratadine 10 mg tablet  Commonly known as: CLARITIN  Take 1 tablet (10 mg total) by mouth once daily.     TEGretol  MG 12 hr tablet  Generic drug: carBAMazepine  TK 3 TS PO BID        ASK your doctor about these  medications    albuterol 90 mcg/actuation inhaler  Commonly known as: VENTOLIN HFA  Inhale 2 puffs into the lungs every 6 (six) hours as needed for Wheezing. Rescue     ezetimibe 10 mg tablet  Commonly known as: ZETIA  Take 1 tablet (10 mg total) by mouth once daily.     ondansetron 4 MG Tbdl  Commonly known as: ZOFRAN-ODT  Take 1 tablet (4 mg total) by mouth every 6 (six) hours as needed (nausea).            Indwelling Lines/Drains at time of discharge:   Lines/Drains/Airways     None                 Time spent on the discharge of patient: 36 minutes         Yoli Wolfe PA-C  Department of Hospital Medicine  [facility] - Intensive Care ([place])

## 2023-01-09 ENCOUNTER — OFFICE VISIT (OUTPATIENT)
Dept: FAMILY MEDICINE | Facility: CLINIC | Age: 43
End: 2023-01-09
Payer: COMMERCIAL

## 2023-01-09 VITALS
HEART RATE: 83 BPM | DIASTOLIC BLOOD PRESSURE: 66 MMHG | OXYGEN SATURATION: 95 % | HEIGHT: 60 IN | WEIGHT: 158.06 LBS | BODY MASS INDEX: 31.03 KG/M2 | SYSTOLIC BLOOD PRESSURE: 120 MMHG

## 2023-01-09 DIAGNOSIS — J10.1 INFLUENZA A: ICD-10-CM

## 2023-01-09 DIAGNOSIS — R07.89 CHEST WALL PAIN: ICD-10-CM

## 2023-01-09 DIAGNOSIS — B37.31 VAGINAL CANDIDIASIS: ICD-10-CM

## 2023-01-09 DIAGNOSIS — J18.9 PNEUMONIA OF BOTH LUNGS DUE TO INFECTIOUS ORGANISM, UNSPECIFIED PART OF LUNG: ICD-10-CM

## 2023-01-09 DIAGNOSIS — Z09 HOSPITAL DISCHARGE FOLLOW-UP: Primary | ICD-10-CM

## 2023-01-09 DIAGNOSIS — G40.909 NONINTRACTABLE EPILEPSY WITHOUT STATUS EPILEPTICUS, UNSPECIFIED EPILEPSY TYPE: ICD-10-CM

## 2023-01-09 DIAGNOSIS — J96.01 ACUTE RESPIRATORY FAILURE WITH HYPOXIA: ICD-10-CM

## 2023-01-09 PROBLEM — I95.9 HYPOTENSION: Status: RESOLVED | Noted: 2023-01-05 | Resolved: 2023-01-09

## 2023-01-09 PROCEDURE — 3008F PR BODY MASS INDEX (BMI) DOCUMENTED: ICD-10-PCS | Mod: CPTII,S$GLB,, | Performed by: FAMILY MEDICINE

## 2023-01-09 PROCEDURE — 3074F SYST BP LT 130 MM HG: CPT | Mod: CPTII,S$GLB,, | Performed by: FAMILY MEDICINE

## 2023-01-09 PROCEDURE — 1111F PR DISCHARGE MEDS RECONCILED W/ CURRENT OUTPATIENT MED LIST: ICD-10-PCS | Mod: CPTII,S$GLB,, | Performed by: FAMILY MEDICINE

## 2023-01-09 PROCEDURE — 3008F BODY MASS INDEX DOCD: CPT | Mod: CPTII,S$GLB,, | Performed by: FAMILY MEDICINE

## 2023-01-09 PROCEDURE — 99214 OFFICE O/P EST MOD 30 MIN: CPT | Mod: S$GLB,,, | Performed by: FAMILY MEDICINE

## 2023-01-09 PROCEDURE — 99214 PR OFFICE/OUTPT VISIT, EST, LEVL IV, 30-39 MIN: ICD-10-PCS | Mod: S$GLB,,, | Performed by: FAMILY MEDICINE

## 2023-01-09 PROCEDURE — 3078F DIAST BP <80 MM HG: CPT | Mod: CPTII,S$GLB,, | Performed by: FAMILY MEDICINE

## 2023-01-09 PROCEDURE — 1159F MED LIST DOCD IN RCRD: CPT | Mod: CPTII,S$GLB,, | Performed by: FAMILY MEDICINE

## 2023-01-09 PROCEDURE — 3078F PR MOST RECENT DIASTOLIC BLOOD PRESSURE < 80 MM HG: ICD-10-PCS | Mod: CPTII,S$GLB,, | Performed by: FAMILY MEDICINE

## 2023-01-09 PROCEDURE — 1159F PR MEDICATION LIST DOCUMENTED IN MEDICAL RECORD: ICD-10-PCS | Mod: CPTII,S$GLB,, | Performed by: FAMILY MEDICINE

## 2023-01-09 PROCEDURE — 3074F PR MOST RECENT SYSTOLIC BLOOD PRESSURE < 130 MM HG: ICD-10-PCS | Mod: CPTII,S$GLB,, | Performed by: FAMILY MEDICINE

## 2023-01-09 PROCEDURE — 99999 PR PBB SHADOW E&M-EST. PATIENT-LVL III: ICD-10-PCS | Mod: PBBFAC,,, | Performed by: FAMILY MEDICINE

## 2023-01-09 PROCEDURE — 1111F DSCHRG MED/CURRENT MED MERGE: CPT | Mod: CPTII,S$GLB,, | Performed by: FAMILY MEDICINE

## 2023-01-09 PROCEDURE — 99999 PR PBB SHADOW E&M-EST. PATIENT-LVL III: CPT | Mod: PBBFAC,,, | Performed by: FAMILY MEDICINE

## 2023-01-09 RX ORDER — FLUCONAZOLE 150 MG/1
150 TABLET ORAL DAILY
Qty: 1 TABLET | Refills: 0 | Status: SHIPPED | OUTPATIENT
Start: 2023-01-09 | End: 2023-01-10

## 2023-01-09 NOTE — PROGRESS NOTES
(Portions of this note were dictated using voice recognition software and may contain dictation related errors in spelling/grammar/syntax not found on text review)    CC:   Chief Complaint   Patient presents with    Hospital Follow Up     Pneumonia       HPI: 42 y.o. female presented for hospital discharge follow-up visit.  She has medical history significant epilepsy and migraine headaches.  She presented to the ED with increased shortness of breath since 12/19.  She states she was diagnosed with bronchitis at urgent care on 12/19 and was discharged home with steroids which did not provide any relief.  She again presented to ED on 12/22 and was diagnosed with influenza a with hypoxia, she was treated with Tamiflu, on completion of treatment with Tamiflu, symptoms did not improve, she was having exertional dyspnea even with minimal exertion, also was unable to lay flat and was sleeping on couch due to shortness of breath. She had left sided chest wall pain with constant coughing. In the ED work up showed normal CMP, troponin neg, Covid/flu negative,EKG NSR w/o ischemic changes. CXR shows suspected bibasilar. CTA without evidence of acute pulmonary embolus to segmental level. Findingd suggestive of bronchopneumonia, she was given duo nebs, Zithromax and rocephin. She was admitted with acute hypoxic respiratory failure with hypoxia sec to pneumonia. She was treated with duo nebs, 5 days of Zithromax and rocephin, symptoms improved and she passed 6 minute walk test, did not qualify for home oxygen. Since discharge she has been feeling better, reports shortness of breath with exertion and cough is improving, has left lateral chest wall pain with palpation which is better than before, using robaxin and lidocaine patches. She reports having vaginal itching and white colored discharge due to antibiotics, denies burning or dysurea. She denies having any other symptoms or concerns.      Past Medical History:   Diagnosis Date     Epilepsy     Migraines        Past Surgical History:   Procedure Laterality Date     SECTION         No family history on file.    Social History     Tobacco Use    Smoking status: Former     Types: Cigarettes     Quit date: 2021     Years since quittin.0    Smokeless tobacco: Never   Substance Use Topics    Alcohol use: Not Currently       Lab Results   Component Value Date    WBC 11.80 2023    HGB 11.9 (L) 2023    HCT 39.7 2023    MCV 90 2023     (H) 2023    CHOL 267 (H) 2021    TRIG 109 2021    HDL 60 2021    ALT 23 2023    AST 22 2023    BILITOT 0.1 2023    ALKPHOS 143 (H) 2023     2023    K 3.9 2023     2023    CREATININE 0.7 2023    ESTGFRAFRICA >60 2021    EGFRNONAA >60 2021    CALCIUM 9.2 2023    ALBUMIN 3.1 (L) 2023    BUN 8 2023    CO2 19 (L) 2023    TSH 2.925 2021    LDLCALC 185.2 (H) 2021    GLU 98 2023             Vital signs reviewed  PE:   APPEARANCE: Well nourished, well developed, in no acute distress.    HEAD: Normocephalic, atraumatic.  EYES: EOMI.  Conjunctivae noninjected.  NECK: Supple with no cervical lymphadenopathy.    CHEST: Good inspiratory effort. Lungs clear to auscultation with no wheezes or crackles.  CARDIOVASCULAR: Normal S1, S2. No rubs, murmurs, or gallops.  ABDOMEN: Bowel sounds normal. Not distended. Soft. No tenderness or masses. No organomegaly.  EXTREMITIES: No edema, cyanosis, or clubbing.    Review of Systems   Constitutional:  Negative for chills, fatigue and fever.   HENT: Negative.     Respiratory:  Negative for cough, shortness of breath and wheezing.    Cardiovascular:  Negative for chest pain, palpitations, leg swelling and claudication.        Chest wall pain     Gastrointestinal: Negative.    Genitourinary: Negative.    Musculoskeletal: Negative.    Neurological: Negative.     Psychiatric/Behavioral: Negative.     All other systems reviewed and are negative.    IMPRESSION  1. Hospital discharge follow-up    2. Acute respiratory failure with hypoxia    3. Pneumonia of both lungs due to infectious organism, unspecified part of lung    4. Influenza A    5. Nonintractable epilepsy without status epilepticus, unspecified epilepsy type    6. Chest wall pain            PLAN      1. Hospital discharge follow-up      2. Acute respiratory failure with hypoxia    3. Pneumonia of both lungs due to infectious organism, unspecified part of lung    4. Influenza A    Improving  S/P 5 days of rocephin and Zithromax in hospital  Continue tessalon pearls prn      5. Nonintractable epilepsy without status epilepticus, unspecified epilepsy type  Stable  Continue tegretol        6. Chest wall pain  Most likely MSK due to coughing  EKG and CXR from hospital reviewed  Continue robaxin prn and lidocaine patches        7. Vaginal candidiasis  - fluconazole (DIFLUCAN) 150 MG Tab; Take 1 tablet (150 mg total) by mouth once daily. for 1 day  Dispense: 1 tablet; Refill: 0            Age/demographic appropriate health maintenance:    Health Maintenance Due   Topic Date Due    Cervical Cancer Screening  Never done    Pneumococcal Vaccines (Age 0-64) (1 - PCV) Never done    TETANUS VACCINE  Never done    Hemoglobin A1c (Diabetic Prevention Screening)  Never done    COVID-19 Vaccine (3 - Booster for Pfizer series) 10/07/2021    Influenza Vaccine (1) Never done         Transitional Care Note    Family and/or Caretaker present at visit?  No.  Diagnostic tests reviewed/disposition: I have reviewed all completed as well as pending diagnostic tests at the time of discharge.  Disease/illness education: yes  Home health/community services discussion/referrals: Patient does not have home health established from hospital visit.  They do not need home health.  If needed, we will set up home health for the patient.   Establishment  or re-establishment of referral orders for community resources: No other necessary community resources.   Discussion with other health care providers: No discussion with other health care providers necessary.          Amrik Owen   1/9/2023

## 2023-01-10 ENCOUNTER — TELEPHONE (OUTPATIENT)
Dept: FAMILY MEDICINE | Facility: CLINIC | Age: 43
End: 2023-01-10
Payer: COMMERCIAL

## 2023-01-11 ENCOUNTER — PATIENT OUTREACH (OUTPATIENT)
Dept: ADMINISTRATIVE | Facility: CLINIC | Age: 43
End: 2023-01-11
Payer: COMMERCIAL

## 2023-01-11 NOTE — TELEPHONE ENCOUNTER
----- Message from Brandee Olivia MA sent at 1/10/2023  4:46 PM CST -----    ----- Message -----  From: Estefania Posey  Sent: 1/10/2023   2:33 PM CST  To: [facility] Staff    Type:  Needs Medical Advice    Who Called:  NANCI MCCARTY/MASON   Symptoms (please be specific):    How long has patient had these symptoms:    Pharmacy name and phone #:    Would the patient rather a call back or a response via [facility]?   Best Call Back Number: [phone]  Additional Information: WOULD LIKE YESTERDAY OFFICE NOTES .[phone]

## 2023-01-17 ENCOUNTER — PATIENT MESSAGE (OUTPATIENT)
Dept: ADMINISTRATIVE | Facility: HOSPITAL | Age: 43
End: 2023-01-17
Payer: COMMERCIAL

## 2023-04-10 PROBLEM — J96.01 ACUTE RESPIRATORY FAILURE WITH HYPOXIA: Status: RESOLVED | Noted: 2022-12-27 | Resolved: 2023-04-10

## 2023-04-10 PROBLEM — J18.9 PNEUMONIA: Status: RESOLVED | Noted: 2023-01-04 | Resolved: 2023-04-10

## 2023-04-11 ENCOUNTER — PATIENT MESSAGE (OUTPATIENT)
Dept: ADMINISTRATIVE | Facility: HOSPITAL | Age: 43
End: 2023-04-11
Payer: COMMERCIAL

## 2023-05-12 ENCOUNTER — PATIENT OUTREACH (OUTPATIENT)
Dept: ADMINISTRATIVE | Facility: HOSPITAL | Age: 43
End: 2023-05-12
Payer: COMMERCIAL

## 2023-05-12 NOTE — PROGRESS NOTES
Non-compliant GAP report chart review - Chart review completed for the following HM test if overdue  (Mammogram, Colonoscopy, Cervical Cancer Screening,  Diabetic lab testing, and/or Dilated EYE EXAM)  05/12/2023    Care Everywhere and Media reports - updates requested and reviewed.        [facility] and [facility] reviewed.  Pap Smear  Health Maintenance Due   Topic Date Due    Cervical Cancer Screening  Never done    Pneumococcal Vaccines (Age 0-64) (1 - PCV) Never done    TETANUS VACCINE  Never done    Hemoglobin A1c (Diabetic Prevention Screening)  Never done    COVID-19 Vaccine (3 - Booster for Pfizer series) 10/07/2021    Mammogram  05/20/2023

## 2023-05-15 DIAGNOSIS — Z34.90 PREGNANCY, UNSPECIFIED GESTATIONAL AGE: Primary | ICD-10-CM

## 2023-05-16 ENCOUNTER — PROCEDURE VISIT (OUTPATIENT)
Dept: OBSTETRICS AND GYNECOLOGY | Facility: CLINIC | Age: 43
End: 2023-05-16
Payer: COMMERCIAL

## 2023-05-16 ENCOUNTER — OFFICE VISIT (OUTPATIENT)
Dept: OBSTETRICS AND GYNECOLOGY | Facility: CLINIC | Age: 43
End: 2023-05-16
Payer: COMMERCIAL

## 2023-05-16 VITALS
SYSTOLIC BLOOD PRESSURE: 124 MMHG | BODY MASS INDEX: 31.77 KG/M2 | WEIGHT: 161.81 LBS | HEIGHT: 60 IN | DIASTOLIC BLOOD PRESSURE: 76 MMHG

## 2023-05-16 DIAGNOSIS — Z34.90 PREGNANCY, UNSPECIFIED GESTATIONAL AGE: ICD-10-CM

## 2023-05-16 DIAGNOSIS — N91.2 ABSENT MENSES: Primary | ICD-10-CM

## 2023-05-16 DIAGNOSIS — G40.909 NONINTRACTABLE EPILEPSY WITHOUT STATUS EPILEPTICUS, UNSPECIFIED EPILEPSY TYPE: ICD-10-CM

## 2023-05-16 DIAGNOSIS — Z98.890 HISTORY OF LOOP ELECTROSURGICAL EXCISION PROCEDURE (LEEP) OF CERVIX AFFECTING PREGNANCY IN FIRST TRIMESTER: ICD-10-CM

## 2023-05-16 DIAGNOSIS — O34.41 HISTORY OF LOOP ELECTROSURGICAL EXCISION PROCEDURE (LEEP) OF CERVIX AFFECTING PREGNANCY IN FIRST TRIMESTER: ICD-10-CM

## 2023-05-16 LAB
B-HCG UR QL: POSITIVE
C TRACH DNA SPEC QL NAA+PROBE: NOT DETECTED
CTP QC/QA: YES
N GONORRHOEA DNA SPEC QL NAA+PROBE: NOT DETECTED

## 2023-05-16 PROCEDURE — 99999 PR PBB SHADOW E&M-EST. PATIENT-LVL III: CPT | Mod: PBBFAC,,, | Performed by: STUDENT IN AN ORGANIZED HEALTH CARE EDUCATION/TRAINING PROGRAM

## 2023-05-16 PROCEDURE — 1160F RVW MEDS BY RX/DR IN RCRD: CPT | Mod: CPTII,S$GLB,, | Performed by: STUDENT IN AN ORGANIZED HEALTH CARE EDUCATION/TRAINING PROGRAM

## 2023-05-16 PROCEDURE — 76801 US OB/GYN EXTENDED PROCEDURE (VIEWPOINT): ICD-10-PCS | Mod: S$GLB,,, | Performed by: OBSTETRICS & GYNECOLOGY

## 2023-05-16 PROCEDURE — 3078F PR MOST RECENT DIASTOLIC BLOOD PRESSURE < 80 MM HG: ICD-10-PCS | Mod: CPTII,S$GLB,, | Performed by: STUDENT IN AN ORGANIZED HEALTH CARE EDUCATION/TRAINING PROGRAM

## 2023-05-16 PROCEDURE — 3044F HG A1C LEVEL LT 7.0%: CPT | Mod: CPTII,S$GLB,, | Performed by: STUDENT IN AN ORGANIZED HEALTH CARE EDUCATION/TRAINING PROGRAM

## 2023-05-16 PROCEDURE — 3074F PR MOST RECENT SYSTOLIC BLOOD PRESSURE < 130 MM HG: ICD-10-PCS | Mod: CPTII,S$GLB,, | Performed by: STUDENT IN AN ORGANIZED HEALTH CARE EDUCATION/TRAINING PROGRAM

## 2023-05-16 PROCEDURE — 3078F DIAST BP <80 MM HG: CPT | Mod: CPTII,S$GLB,, | Performed by: STUDENT IN AN ORGANIZED HEALTH CARE EDUCATION/TRAINING PROGRAM

## 2023-05-16 PROCEDURE — 87086 URINE CULTURE/COLONY COUNT: CPT | Performed by: STUDENT IN AN ORGANIZED HEALTH CARE EDUCATION/TRAINING PROGRAM

## 2023-05-16 PROCEDURE — 99203 PR OFFICE/OUTPT VISIT, NEW, LEVL III, 30-44 MIN: ICD-10-PCS | Mod: S$GLB,,, | Performed by: STUDENT IN AN ORGANIZED HEALTH CARE EDUCATION/TRAINING PROGRAM

## 2023-05-16 PROCEDURE — 3008F PR BODY MASS INDEX (BMI) DOCUMENTED: ICD-10-PCS | Mod: CPTII,S$GLB,, | Performed by: STUDENT IN AN ORGANIZED HEALTH CARE EDUCATION/TRAINING PROGRAM

## 2023-05-16 PROCEDURE — 99999 PR PBB SHADOW E&M-EST. PATIENT-LVL III: ICD-10-PCS | Mod: PBBFAC,,, | Performed by: STUDENT IN AN ORGANIZED HEALTH CARE EDUCATION/TRAINING PROGRAM

## 2023-05-16 PROCEDURE — 3044F PR MOST RECENT HEMOGLOBIN A1C LEVEL <7.0%: ICD-10-PCS | Mod: CPTII,S$GLB,, | Performed by: STUDENT IN AN ORGANIZED HEALTH CARE EDUCATION/TRAINING PROGRAM

## 2023-05-16 PROCEDURE — 81025 URINE PREGNANCY TEST: CPT | Mod: S$GLB,,, | Performed by: STUDENT IN AN ORGANIZED HEALTH CARE EDUCATION/TRAINING PROGRAM

## 2023-05-16 PROCEDURE — 76801 OB US < 14 WKS SINGLE FETUS: CPT | Mod: S$GLB,,, | Performed by: OBSTETRICS & GYNECOLOGY

## 2023-05-16 PROCEDURE — 81025 POCT URINE PREGNANCY: ICD-10-PCS | Mod: S$GLB,,, | Performed by: STUDENT IN AN ORGANIZED HEALTH CARE EDUCATION/TRAINING PROGRAM

## 2023-05-16 PROCEDURE — 3074F SYST BP LT 130 MM HG: CPT | Mod: CPTII,S$GLB,, | Performed by: STUDENT IN AN ORGANIZED HEALTH CARE EDUCATION/TRAINING PROGRAM

## 2023-05-16 PROCEDURE — 1159F PR MEDICATION LIST DOCUMENTED IN MEDICAL RECORD: ICD-10-PCS | Mod: CPTII,S$GLB,, | Performed by: STUDENT IN AN ORGANIZED HEALTH CARE EDUCATION/TRAINING PROGRAM

## 2023-05-16 PROCEDURE — 99203 OFFICE O/P NEW LOW 30 MIN: CPT | Mod: S$GLB,,, | Performed by: STUDENT IN AN ORGANIZED HEALTH CARE EDUCATION/TRAINING PROGRAM

## 2023-05-16 PROCEDURE — 87591 N.GONORRHOEAE DNA AMP PROB: CPT | Performed by: STUDENT IN AN ORGANIZED HEALTH CARE EDUCATION/TRAINING PROGRAM

## 2023-05-16 PROCEDURE — 1159F MED LIST DOCD IN RCRD: CPT | Mod: CPTII,S$GLB,, | Performed by: STUDENT IN AN ORGANIZED HEALTH CARE EDUCATION/TRAINING PROGRAM

## 2023-05-16 PROCEDURE — 3008F BODY MASS INDEX DOCD: CPT | Mod: CPTII,S$GLB,, | Performed by: STUDENT IN AN ORGANIZED HEALTH CARE EDUCATION/TRAINING PROGRAM

## 2023-05-16 PROCEDURE — 1160F PR REVIEW ALL MEDS BY PRESCRIBER/CLIN PHARMACIST DOCUMENTED: ICD-10-PCS | Mod: CPTII,S$GLB,, | Performed by: STUDENT IN AN ORGANIZED HEALTH CARE EDUCATION/TRAINING PROGRAM

## 2023-05-16 NOTE — PROGRESS NOTES
"Chief Complaint: Absence of Menses     HPI:      Marilu Frausto is a 42 y.o.  who presents complaining of absence of menses. She reports unplanned pregnancy. C/o nausea/vomiting episodes in the afternoon. Denies abdominal pain or bleeding. Patient's last menstrual period was 03/15/2023 (exact date).    Pregnancy History:    - c/s x 2, both at term, denies complications    Medications:  Folic acid, PNV, tegretrol  daily, propanolol 120 daily     GYN Hx:  H/o LEEP in [facility], normal pap smears since  Denies STD/HSV history    Past Medical History:   Diagnosis Date    Epilepsy     Migraines    - epilepsy: well-controlled, follows with Neurology, was switched to keppra 2nd pregnancy but had breakthrough seizures so was then placed back on tegretol, last pregnancy was her last seizure    Past Surgical History:   Procedure Laterality Date     SECTION      c/s x 2     Social History     Tobacco Use    Smoking status: Former     Types: Cigarettes     Quit date: 2021     Years since quittin.4    Smokeless tobacco: Never   Substance Use Topics    Alcohol use: Not Currently     History reviewed. No pertinent family history.  OB History    Para Term  AB Living   3 2 2     2   SAB IAB Ectopic Multiple Live Births           2      # Outcome Date GA Lbr Carlos/2nd Weight Sex Delivery Anes PTL Lv   3 Current            2 Term      CS-LTranv   YASSINE      Birth Comments: 9#   1 Term      CS-LTranv   YASSINE      Birth Comments:  due to "baby with broken ribs" 6# 7 oz at 37 wks       Physical Exam:      PHYSICAL EXAM:  /76 (BP Location: Left arm, Patient Position: Sitting, BP Method: Medium (Manual))   Ht 5' (1.524 m)   Wt 73.4 kg (161 lb 13.1 oz)   LMP 03/15/2023 (Exact Date)   BMI 31.60 kg/m²   Body mass index is 31.6 kg/m².     APPEARANCE: Well nourished, well developed, in no acute distress.    Assessment/Plan:     Marilu was seen today for possible " pregnancy.    Diagnoses and all orders for this visit:    Absent menses  -     POCT urine pregnancy  -     HEMOGLOBIN A1C; Future    Pregnancy, unspecified gestational age  -     C. trachomatis/N. gonorrhoeae by AMP DNA [facility]; Urine  -     Urine culture  -     CBC Auto Differential; Future  -     Cancel: Glucose, Random; Future  -     Hepatitis B Surface Antigen; Future  -     Hepatitis C Antibody; Future  -     HIV 1/2 Ag/Ab (4th Gen); Future  -     Type & Screen; Future  -     Cancel: TSH; Future  -     Rubella Antibody, IgG; Future  -     RPR; Future  -     US MFM Procedure (Viewpoint); Future    Nonintractable epilepsy without status epilepticus, unspecified epilepsy type  -     Ambulatory referral/consult to Perinatology; Future    History of loop electrosurgical excision procedure (LEEP) of cervix affecting pregnancy in first trimester      - dating US scheduled, EGA 8w6d by LMP  - prenatal labs, A1C, urine cx, gc/cz today  - discussed [id number] recommended as genetic screen due to AMA, considering, phone# provided to assess cost  - discussed initiating ASA 81 mg daily at 12-16 wks due to preE risk factors  - MFM consultation for epilepsy (pt wanting to stay on tegretol, discussed R/B/A)  - counseling as below, pregnancy pamphlet provided  - RTC 4 wks for RPV    Counselin. Patient was counseled today on proper weight gain based on the [place] [employer] [facility]'s recommendations based on her pre-pregnancy weight.   2. Discussed dietary recommendations.  3. Encouraged compliance with prenatal vitamins.  4. Optional genetic testing discussed.   5. Safety of exercise discussed with patient, and continued active lifestyle encouraged.  6. COVID-19 virus precautions for both patient and her spouse discussed, and available data on COVID vaccination reviewed.  7. Normal course of prenatal care reviewed.  8. Medications safe in pregnancy discussed and list provided.    Use of the [facility] Patient Portal discussed  and encouraged during today's visit.      Aleyda Méndez MD  Obstetrics and Gynecology  [facility]

## 2023-05-17 LAB — BACTERIA UR CULT: NO GROWTH

## 2023-05-24 ENCOUNTER — PATIENT MESSAGE (OUTPATIENT)
Dept: OBSTETRICS AND GYNECOLOGY | Facility: CLINIC | Age: 43
End: 2023-05-24
Payer: COMMERCIAL

## 2023-05-24 ENCOUNTER — LAB VISIT (OUTPATIENT)
Dept: LAB | Facility: HOSPITAL | Age: 43
End: 2023-05-24
Attending: STUDENT IN AN ORGANIZED HEALTH CARE EDUCATION/TRAINING PROGRAM
Payer: COMMERCIAL

## 2023-05-24 DIAGNOSIS — Z34.90 PREGNANCY, UNSPECIFIED GESTATIONAL AGE: ICD-10-CM

## 2023-05-24 DIAGNOSIS — Z12.31 OTHER SCREENING MAMMOGRAM: ICD-10-CM

## 2023-05-24 DIAGNOSIS — N91.2 ABSENT MENSES: ICD-10-CM

## 2023-05-24 PROBLEM — Z98.890 HISTORY OF LOOP ELECTROSURGICAL EXCISION PROCEDURE (LEEP) OF CERVIX AFFECTING PREGNANCY IN FIRST TRIMESTER: Status: ACTIVE | Noted: 2023-05-24

## 2023-05-24 PROBLEM — O34.41 HISTORY OF LOOP ELECTROSURGICAL EXCISION PROCEDURE (LEEP) OF CERVIX AFFECTING PREGNANCY IN FIRST TRIMESTER: Status: ACTIVE | Noted: 2023-05-24

## 2023-05-24 LAB
ABO + RH BLD: NORMAL
BASOPHILS # BLD AUTO: 0.07 K/UL (ref 0–0.2)
BASOPHILS NFR BLD: 0.8 % (ref 0–1.9)
BLD GP AB SCN CELLS X3 SERPL QL: NORMAL
DIFFERENTIAL METHOD: ABNORMAL
EOSINOPHIL # BLD AUTO: 0.2 K/UL (ref 0–0.5)
EOSINOPHIL NFR BLD: 2 % (ref 0–8)
ERYTHROCYTE [DISTWIDTH] IN BLOOD BY AUTOMATED COUNT: 12.8 % (ref 11.5–14.5)
ESTIMATED AVG GLUCOSE: 103 MG/DL (ref 68–131)
HBA1C MFR BLD: 5.2 % (ref 4–5.6)
HBV SURFACE AG SERPL QL IA: NORMAL
HCT VFR BLD AUTO: 42 % (ref 37–48.5)
HCV AB SERPL QL IA: NORMAL
HGB BLD-MCNC: 13.5 G/DL (ref 12–16)
HIV 1+2 AB+HIV1 P24 AG SERPL QL IA: NORMAL
IMM GRANULOCYTES # BLD AUTO: 0.03 K/UL (ref 0–0.04)
IMM GRANULOCYTES NFR BLD AUTO: 0.4 % (ref 0–0.5)
LYMPHOCYTES # BLD AUTO: 2.3 K/UL (ref 1–4.8)
LYMPHOCYTES NFR BLD: 26.8 % (ref 18–48)
MCH RBC QN AUTO: 30 PG (ref 27–31)
MCHC RBC AUTO-ENTMCNC: 32.1 G/DL (ref 32–36)
MCV RBC AUTO: 93 FL (ref 82–98)
MONOCYTES # BLD AUTO: 0.6 K/UL (ref 0.3–1)
MONOCYTES NFR BLD: 6.9 % (ref 4–15)
NEUTROPHILS # BLD AUTO: 5.4 K/UL (ref 1.8–7.7)
NEUTROPHILS NFR BLD: 63.1 % (ref 38–73)
NRBC BLD-RTO: 0 /100 WBC
PLATELET # BLD AUTO: 487 K/UL (ref 150–450)
PMV BLD AUTO: 8.5 FL (ref 9.2–12.9)
RBC # BLD AUTO: 4.5 M/UL (ref 4–5.4)
SPECIMEN OUTDATE: NORMAL
WBC # BLD AUTO: 8.5 K/UL (ref 3.9–12.7)

## 2023-05-24 PROCEDURE — 83036 HEMOGLOBIN GLYCOSYLATED A1C: CPT | Performed by: STUDENT IN AN ORGANIZED HEALTH CARE EDUCATION/TRAINING PROGRAM

## 2023-05-24 PROCEDURE — 86762 RUBELLA ANTIBODY: CPT | Performed by: STUDENT IN AN ORGANIZED HEALTH CARE EDUCATION/TRAINING PROGRAM

## 2023-05-24 PROCEDURE — 86900 BLOOD TYPING SEROLOGIC ABO: CPT | Performed by: STUDENT IN AN ORGANIZED HEALTH CARE EDUCATION/TRAINING PROGRAM

## 2023-05-24 PROCEDURE — 87340 HEPATITIS B SURFACE AG IA: CPT | Performed by: STUDENT IN AN ORGANIZED HEALTH CARE EDUCATION/TRAINING PROGRAM

## 2023-05-24 PROCEDURE — 86592 SYPHILIS TEST NON-TREP QUAL: CPT | Performed by: STUDENT IN AN ORGANIZED HEALTH CARE EDUCATION/TRAINING PROGRAM

## 2023-05-24 PROCEDURE — 36415 COLL VENOUS BLD VENIPUNCTURE: CPT | Performed by: STUDENT IN AN ORGANIZED HEALTH CARE EDUCATION/TRAINING PROGRAM

## 2023-05-24 PROCEDURE — 86803 HEPATITIS C AB TEST: CPT | Performed by: STUDENT IN AN ORGANIZED HEALTH CARE EDUCATION/TRAINING PROGRAM

## 2023-05-24 PROCEDURE — 85025 COMPLETE CBC W/AUTO DIFF WBC: CPT | Performed by: STUDENT IN AN ORGANIZED HEALTH CARE EDUCATION/TRAINING PROGRAM

## 2023-05-24 PROCEDURE — 87389 HIV-1 AG W/HIV-1&-2 AB AG IA: CPT | Performed by: STUDENT IN AN ORGANIZED HEALTH CARE EDUCATION/TRAINING PROGRAM

## 2023-05-25 DIAGNOSIS — G40.909 NONINTRACTABLE EPILEPSY WITHOUT STATUS EPILEPTICUS, UNSPECIFIED EPILEPSY TYPE: Primary | ICD-10-CM

## 2023-05-25 LAB
RPR SER QL: NORMAL
RUBV IGG SER-ACNC: 71.7 IU/ML
RUBV IGG SER-IMP: REACTIVE

## 2023-05-25 RX ORDER — FOLIC ACID 1 MG/1
4 TABLET ORAL DAILY
Qty: 120 TABLET | Refills: 2 | Status: SHIPPED | OUTPATIENT
Start: 2023-05-25 | End: 2023-09-11

## 2023-05-29 ENCOUNTER — PATIENT MESSAGE (OUTPATIENT)
Dept: MATERNAL FETAL MEDICINE | Facility: CLINIC | Age: 43
End: 2023-05-29
Payer: COMMERCIAL

## 2023-05-29 ENCOUNTER — TELEPHONE (OUTPATIENT)
Dept: MATERNAL FETAL MEDICINE | Facility: CLINIC | Age: 43
End: 2023-05-29
Payer: COMMERCIAL

## 2023-05-29 ENCOUNTER — PATIENT OUTREACH (OUTPATIENT)
Dept: ADMINISTRATIVE | Facility: HOSPITAL | Age: 43
End: 2023-05-29
Payer: COMMERCIAL

## 2023-05-29 ENCOUNTER — PATIENT MESSAGE (OUTPATIENT)
Dept: ADMINISTRATIVE | Facility: HOSPITAL | Age: 43
End: 2023-05-29
Payer: COMMERCIAL

## 2023-05-29 NOTE — TELEPHONE ENCOUNTER
-Left patient a message to return my call at [phone].-      --- Message from Asya Garzon MA sent at 5/29/2023 10:35 AM CDMORIS -----  Pt is returning your call

## 2023-05-29 NOTE — LETTER
AUTHORIZATION FOR RELEASE OF   CONFIDENTIAL INFORMATION    Analilia    We are seeing Marilu Frausto, date of birth 1980, in the clinic at [facility]. Amrik Owen MD is the patient's PCP. Marilu Frausto has an outstanding lab/procedure at the time we reviewed her chart. In order to help keep her health information updated, she has authorized us to request the following medical record(s):                                             ( xx )  MAMMOGRAM                                                      Please fax records to [facility], Farah Munir, MD, [phone].     If you have any questions, please contact Summer Franklin, Care Coordinator  at [phone].              Patient Name: Marilu Frausto  : 1980  Patient Phone #: [phone]

## 2023-05-29 NOTE — PROGRESS NOTES
Non-compliant GAP report chart review - Chart review completed for the following HM test if overdue  (Mammogram, Colonoscopy, Cervical Cancer Screening,  Diabetic lab testing, and/or Dilated EYE EXAM)  05/29/2023    Care Everywhere and Media reports - updates requested and reviewed.      Requested  Mammogram   records         Health Maintenance Due   Topic Date Due    Cervical Cancer Screening  Never done    Pneumococcal Vaccines (Age 0-64) (1 - PCV) Never done    TETANUS VACCINE  Never done    COVID-19 Vaccine (3 - Pfizer series) 10/07/2021    Mammogram  05/20/2023

## 2023-05-31 ENCOUNTER — PATIENT OUTREACH (OUTPATIENT)
Dept: ADMINISTRATIVE | Facility: HOSPITAL | Age: 43
End: 2023-05-31
Payer: COMMERCIAL

## 2023-06-04 ENCOUNTER — HOSPITAL ENCOUNTER (OUTPATIENT)
Facility: OTHER | Age: 43
Discharge: HOME | End: 2023-06-05
Attending: EMERGENCY MEDICINE | Admitting: HOSPITALIST
Payer: COMMERCIAL

## 2023-06-04 ENCOUNTER — TELEPHONE (OUTPATIENT)
Dept: OBSTETRICS AND GYNECOLOGY | Facility: OTHER | Age: 43
End: 2023-06-04
Payer: COMMERCIAL

## 2023-06-04 ENCOUNTER — ANESTHESIA EVENT (OUTPATIENT)
Dept: SURGERY | Facility: OTHER | Age: 43
End: 2023-06-04
Payer: COMMERCIAL

## 2023-06-04 ENCOUNTER — PATIENT MESSAGE (OUTPATIENT)
Dept: OBSTETRICS AND GYNECOLOGY | Facility: CLINIC | Age: 43
End: 2023-06-04
Payer: COMMERCIAL

## 2023-06-04 ENCOUNTER — ANESTHESIA (OUTPATIENT)
Dept: SURGERY | Facility: OTHER | Age: 43
End: 2023-06-04
Payer: COMMERCIAL

## 2023-06-04 DIAGNOSIS — Z34.90 PREGNANCY, UNSPECIFIED GESTATIONAL AGE: ICD-10-CM

## 2023-06-04 DIAGNOSIS — K35.30 ACUTE APPENDICITIS WITH LOCALIZED PERITONITIS, UNSPECIFIED WHETHER ABSCESS PRESENT, UNSPECIFIED WHETHER GANGRENE PRESENT, UNSPECIFIED WHETHER PERFORATION PRESENT: Primary | ICD-10-CM

## 2023-06-04 PROBLEM — Z3A.12 12 WEEKS GESTATION OF PREGNANCY: Status: ACTIVE | Noted: 2023-06-04

## 2023-06-04 PROBLEM — K35.80 ACUTE APPENDICITIS: Status: ACTIVE | Noted: 2023-06-04

## 2023-06-04 LAB
ALBUMIN SERPL BCP-MCNC: 3 G/DL (ref 3.5–5.2)
ALBUMIN SERPL BCP-MCNC: 3.4 G/DL (ref 3.5–5.2)
ALP SERPL-CCNC: 91 U/L (ref 55–135)
ALP SERPL-CCNC: 99 U/L (ref 55–135)
ALT SERPL W/O P-5'-P-CCNC: 14 U/L (ref 10–44)
ALT SERPL W/O P-5'-P-CCNC: 16 U/L (ref 10–44)
ANION GAP SERPL CALC-SCNC: 12 MMOL/L (ref 8–16)
ANION GAP SERPL CALC-SCNC: 12 MMOL/L (ref 8–16)
AST SERPL-CCNC: 13 U/L (ref 10–40)
AST SERPL-CCNC: 14 U/L (ref 10–40)
BASOPHILS # BLD AUTO: 0.06 K/UL (ref 0–0.2)
BASOPHILS # BLD AUTO: 0.07 K/UL (ref 0–0.2)
BASOPHILS NFR BLD: 0.3 % (ref 0–1.9)
BASOPHILS NFR BLD: 0.4 % (ref 0–1.9)
BILIRUB SERPL-MCNC: 0.2 MG/DL (ref 0.1–1)
BILIRUB SERPL-MCNC: 0.3 MG/DL (ref 0.1–1)
BILIRUB UR QL STRIP: NEGATIVE
BUN SERPL-MCNC: 7 MG/DL (ref 6–20)
BUN SERPL-MCNC: 9 MG/DL (ref 6–20)
CALCIUM SERPL-MCNC: 8.4 MG/DL (ref 8.7–10.5)
CALCIUM SERPL-MCNC: 9.1 MG/DL (ref 8.7–10.5)
CHLORIDE SERPL-SCNC: 105 MMOL/L (ref 95–110)
CHLORIDE SERPL-SCNC: 107 MMOL/L (ref 95–110)
CLARITY UR: CLEAR
CO2 SERPL-SCNC: 18 MMOL/L (ref 23–29)
CO2 SERPL-SCNC: 19 MMOL/L (ref 23–29)
COLOR UR: YELLOW
CREAT SERPL-MCNC: 0.6 MG/DL (ref 0.5–1.4)
CREAT SERPL-MCNC: 0.7 MG/DL (ref 0.5–1.4)
DIFFERENTIAL METHOD: ABNORMAL
DIFFERENTIAL METHOD: ABNORMAL
EOSINOPHIL # BLD AUTO: 0 K/UL (ref 0–0.5)
EOSINOPHIL # BLD AUTO: 0 K/UL (ref 0–0.5)
EOSINOPHIL NFR BLD: 0.1 % (ref 0–8)
EOSINOPHIL NFR BLD: 0.1 % (ref 0–8)
ERYTHROCYTE [DISTWIDTH] IN BLOOD BY AUTOMATED COUNT: 13 % (ref 11.5–14.5)
ERYTHROCYTE [DISTWIDTH] IN BLOOD BY AUTOMATED COUNT: 13.1 % (ref 11.5–14.5)
EST. GFR  (NO RACE VARIABLE): >60 ML/MIN/1.73 M^2
EST. GFR  (NO RACE VARIABLE): >60 ML/MIN/1.73 M^2
GLUCOSE SERPL-MCNC: 119 MG/DL (ref 70–110)
GLUCOSE SERPL-MCNC: 132 MG/DL (ref 70–110)
GLUCOSE UR QL STRIP: NEGATIVE
HCT VFR BLD AUTO: 36.4 % (ref 37–48.5)
HCT VFR BLD AUTO: 38.4 % (ref 37–48.5)
HGB BLD-MCNC: 12 G/DL (ref 12–16)
HGB BLD-MCNC: 12.9 G/DL (ref 12–16)
HGB UR QL STRIP: NEGATIVE
IMM GRANULOCYTES # BLD AUTO: 0.07 K/UL (ref 0–0.04)
IMM GRANULOCYTES # BLD AUTO: 0.07 K/UL (ref 0–0.04)
IMM GRANULOCYTES NFR BLD AUTO: 0.4 % (ref 0–0.5)
IMM GRANULOCYTES NFR BLD AUTO: 0.4 % (ref 0–0.5)
KETONES UR QL STRIP: ABNORMAL
LEUKOCYTE ESTERASE UR QL STRIP: NEGATIVE
LYMPHOCYTES # BLD AUTO: 1.7 K/UL (ref 1–4.8)
LYMPHOCYTES # BLD AUTO: 1.8 K/UL (ref 1–4.8)
LYMPHOCYTES NFR BLD: 10.9 % (ref 18–48)
LYMPHOCYTES NFR BLD: 9.6 % (ref 18–48)
MAGNESIUM SERPL-MCNC: 1.7 MG/DL (ref 1.6–2.6)
MCH RBC QN AUTO: 30.5 PG (ref 27–31)
MCH RBC QN AUTO: 30.9 PG (ref 27–31)
MCHC RBC AUTO-ENTMCNC: 33 G/DL (ref 32–36)
MCHC RBC AUTO-ENTMCNC: 33.6 G/DL (ref 32–36)
MCV RBC AUTO: 92 FL (ref 82–98)
MCV RBC AUTO: 92 FL (ref 82–98)
MONOCYTES # BLD AUTO: 0.7 K/UL (ref 0.3–1)
MONOCYTES # BLD AUTO: 0.8 K/UL (ref 0.3–1)
MONOCYTES NFR BLD: 3.8 % (ref 4–15)
MONOCYTES NFR BLD: 5 % (ref 4–15)
NEUTROPHILS # BLD AUTO: 13.9 K/UL (ref 1.8–7.7)
NEUTROPHILS # BLD AUTO: 15 K/UL (ref 1.8–7.7)
NEUTROPHILS NFR BLD: 83.2 % (ref 38–73)
NEUTROPHILS NFR BLD: 85.8 % (ref 38–73)
NITRITE UR QL STRIP: NEGATIVE
NRBC BLD-RTO: 0 /100 WBC
NRBC BLD-RTO: 0 /100 WBC
PH UR STRIP: 7 [PH] (ref 5–8)
PHOSPHATE SERPL-MCNC: 3.5 MG/DL (ref 2.7–4.5)
PLATELET # BLD AUTO: 427 K/UL (ref 150–450)
PLATELET # BLD AUTO: 441 K/UL (ref 150–450)
PMV BLD AUTO: 8 FL (ref 9.2–12.9)
PMV BLD AUTO: 8 FL (ref 9.2–12.9)
POTASSIUM SERPL-SCNC: 3.6 MMOL/L (ref 3.5–5.1)
POTASSIUM SERPL-SCNC: 3.8 MMOL/L (ref 3.5–5.1)
PROT SERPL-MCNC: 6.5 G/DL (ref 6–8.4)
PROT SERPL-MCNC: 7.2 G/DL (ref 6–8.4)
PROT UR QL STRIP: ABNORMAL
RBC # BLD AUTO: 3.94 M/UL (ref 4–5.4)
RBC # BLD AUTO: 4.18 M/UL (ref 4–5.4)
SODIUM SERPL-SCNC: 136 MMOL/L (ref 136–145)
SODIUM SERPL-SCNC: 137 MMOL/L (ref 136–145)
SP GR UR STRIP: 1.03 (ref 1–1.03)
URN SPEC COLLECT METH UR: ABNORMAL
UROBILINOGEN UR STRIP-ACNC: NEGATIVE EU/DL
WBC # BLD AUTO: 16.72 K/UL (ref 3.9–12.7)
WBC # BLD AUTO: 17.51 K/UL (ref 3.9–12.7)

## 2023-06-04 PROCEDURE — 63600175 PHARM REV CODE 636 W HCPCS: Performed by: HOSPITALIST

## 2023-06-04 PROCEDURE — 37000009 HC ANESTHESIA EA ADD 15 MINS: Performed by: STUDENT IN AN ORGANIZED HEALTH CARE EDUCATION/TRAINING PROGRAM

## 2023-06-04 PROCEDURE — 25000003 PHARM REV CODE 250: Performed by: STUDENT IN AN ORGANIZED HEALTH CARE EDUCATION/TRAINING PROGRAM

## 2023-06-04 PROCEDURE — 96375 TX/PRO/DX INJ NEW DRUG ADDON: CPT | Mod: 59

## 2023-06-04 PROCEDURE — 80053 COMPREHEN METABOLIC PANEL: CPT | Performed by: EMERGENCY MEDICINE

## 2023-06-04 PROCEDURE — 99223 PR INITIAL HOSPITAL CARE,LEVL III: ICD-10-PCS | Mod: ,,, | Performed by: HOSPITALIST

## 2023-06-04 PROCEDURE — 37000008 HC ANESTHESIA 1ST 15 MINUTES: Performed by: STUDENT IN AN ORGANIZED HEALTH CARE EDUCATION/TRAINING PROGRAM

## 2023-06-04 PROCEDURE — 36000709 HC OR TIME LEV III EA ADD 15 MIN: Performed by: STUDENT IN AN ORGANIZED HEALTH CARE EDUCATION/TRAINING PROGRAM

## 2023-06-04 PROCEDURE — 99285 EMERGENCY DEPT VISIT HI MDM: CPT | Mod: 25

## 2023-06-04 PROCEDURE — 88304 PR  SURG PATH,LEVEL III: ICD-10-PCS | Mod: 26,,, | Performed by: PATHOLOGY

## 2023-06-04 PROCEDURE — 96365 THER/PROPH/DIAG IV INF INIT: CPT

## 2023-06-04 PROCEDURE — 83735 ASSAY OF MAGNESIUM: CPT | Performed by: NURSE PRACTITIONER

## 2023-06-04 PROCEDURE — 96366 THER/PROPH/DIAG IV INF ADDON: CPT

## 2023-06-04 PROCEDURE — 96361 HYDRATE IV INFUSION ADD-ON: CPT

## 2023-06-04 PROCEDURE — 44970 LAPAROSCOPY APPENDECTOMY: CPT | Mod: ,,, | Performed by: STUDENT IN AN ORGANIZED HEALTH CARE EDUCATION/TRAINING PROGRAM

## 2023-06-04 PROCEDURE — 80053 COMPREHEN METABOLIC PANEL: CPT | Mod: 91 | Performed by: NURSE PRACTITIONER

## 2023-06-04 PROCEDURE — G0378 HOSPITAL OBSERVATION PER HR: HCPCS

## 2023-06-04 PROCEDURE — 25000003 PHARM REV CODE 250: Performed by: NURSE PRACTITIONER

## 2023-06-04 PROCEDURE — 63600175 PHARM REV CODE 636 W HCPCS: Performed by: STUDENT IN AN ORGANIZED HEALTH CARE EDUCATION/TRAINING PROGRAM

## 2023-06-04 PROCEDURE — D9220A PRA ANESTHESIA: ICD-10-PCS | Mod: ANES,,, | Performed by: ANESTHESIOLOGY

## 2023-06-04 PROCEDURE — D9220A PRA ANESTHESIA: Mod: ANES,,, | Performed by: ANESTHESIOLOGY

## 2023-06-04 PROCEDURE — 25000003 PHARM REV CODE 250: Performed by: HOSPITALIST

## 2023-06-04 PROCEDURE — 99205 OFFICE O/P NEW HI 60 MIN: CPT | Mod: 57,,, | Performed by: STUDENT IN AN ORGANIZED HEALTH CARE EDUCATION/TRAINING PROGRAM

## 2023-06-04 PROCEDURE — 99223 1ST HOSP IP/OBS HIGH 75: CPT | Mod: ,,, | Performed by: HOSPITALIST

## 2023-06-04 PROCEDURE — 96375 TX/PRO/DX INJ NEW DRUG ADDON: CPT

## 2023-06-04 PROCEDURE — 96376 TX/PRO/DX INJ SAME DRUG ADON: CPT | Mod: 59

## 2023-06-04 PROCEDURE — D9220A PRA ANESTHESIA: ICD-10-PCS | Mod: CRNA,,, | Performed by: STUDENT IN AN ORGANIZED HEALTH CARE EDUCATION/TRAINING PROGRAM

## 2023-06-04 PROCEDURE — 85025 COMPLETE CBC W/AUTO DIFF WBC: CPT | Performed by: EMERGENCY MEDICINE

## 2023-06-04 PROCEDURE — 81003 URINALYSIS AUTO W/O SCOPE: CPT | Performed by: NURSE PRACTITIONER

## 2023-06-04 PROCEDURE — 63600175 PHARM REV CODE 636 W HCPCS: Performed by: NURSE PRACTITIONER

## 2023-06-04 PROCEDURE — 94761 N-INVAS EAR/PLS OXIMETRY MLT: CPT

## 2023-06-04 PROCEDURE — 71000033 HC RECOVERY, INTIAL HOUR: Performed by: STUDENT IN AN ORGANIZED HEALTH CARE EDUCATION/TRAINING PROGRAM

## 2023-06-04 PROCEDURE — 25000003 PHARM REV CODE 250: Performed by: EMERGENCY MEDICINE

## 2023-06-04 PROCEDURE — 63600175 PHARM REV CODE 636 W HCPCS: Performed by: EMERGENCY MEDICINE

## 2023-06-04 PROCEDURE — 99205 PR OFFICE/OUTPT VISIT, NEW, LEVL V, 60-74 MIN: ICD-10-PCS | Mod: 57,,, | Performed by: STUDENT IN AN ORGANIZED HEALTH CARE EDUCATION/TRAINING PROGRAM

## 2023-06-04 PROCEDURE — 44970 PR LAP,APPENDECTOMY: ICD-10-PCS | Mod: ,,, | Performed by: STUDENT IN AN ORGANIZED HEALTH CARE EDUCATION/TRAINING PROGRAM

## 2023-06-04 PROCEDURE — D9220A PRA ANESTHESIA: Mod: CRNA,,, | Performed by: STUDENT IN AN ORGANIZED HEALTH CARE EDUCATION/TRAINING PROGRAM

## 2023-06-04 PROCEDURE — 88304 TISSUE EXAM BY PATHOLOGIST: CPT | Performed by: PATHOLOGY

## 2023-06-04 PROCEDURE — 84100 ASSAY OF PHOSPHORUS: CPT | Performed by: NURSE PRACTITIONER

## 2023-06-04 PROCEDURE — 25000003 PHARM REV CODE 250: Performed by: ANESTHESIOLOGY

## 2023-06-04 PROCEDURE — 88304 TISSUE EXAM BY PATHOLOGIST: CPT | Mod: 26,,, | Performed by: PATHOLOGY

## 2023-06-04 PROCEDURE — 36000708 HC OR TIME LEV III 1ST 15 MIN: Performed by: STUDENT IN AN ORGANIZED HEALTH CARE EDUCATION/TRAINING PROGRAM

## 2023-06-04 PROCEDURE — 85025 COMPLETE CBC W/AUTO DIFF WBC: CPT | Mod: 91 | Performed by: NURSE PRACTITIONER

## 2023-06-04 PROCEDURE — 27201423 OPTIME MED/SURG SUP & DEVICES STERILE SUPPLY: Performed by: STUDENT IN AN ORGANIZED HEALTH CARE EDUCATION/TRAINING PROGRAM

## 2023-06-04 RX ORDER — ACETAMINOPHEN 325 MG/1
650 TABLET ORAL EVERY 4 HOURS PRN
Status: DISCONTINUED | OUTPATIENT
Start: 2023-06-04 | End: 2023-06-05

## 2023-06-04 RX ORDER — DIPHENHYDRAMINE HYDROCHLORIDE 50 MG/ML
25 INJECTION INTRAMUSCULAR; INTRAVENOUS EVERY 6 HOURS PRN
Status: DISCONTINUED | OUTPATIENT
Start: 2023-06-04 | End: 2023-06-05 | Stop reason: HOSPADM

## 2023-06-04 RX ORDER — MORPHINE SULFATE 2 MG/ML
6 INJECTION, SOLUTION INTRAMUSCULAR; INTRAVENOUS
Status: COMPLETED | OUTPATIENT
Start: 2023-06-04 | End: 2023-06-04

## 2023-06-04 RX ORDER — CARBAMAZEPINE 200 MG/1
600 TABLET, EXTENDED RELEASE ORAL 2 TIMES DAILY
Status: DISCONTINUED | OUTPATIENT
Start: 2023-06-04 | End: 2023-06-05 | Stop reason: HOSPADM

## 2023-06-04 RX ORDER — MORPHINE SULFATE 2 MG/ML
2 INJECTION, SOLUTION INTRAMUSCULAR; INTRAVENOUS EVERY 4 HOURS PRN
Status: DISCONTINUED | OUTPATIENT
Start: 2023-06-04 | End: 2023-06-05 | Stop reason: HOSPADM

## 2023-06-04 RX ORDER — HYDROMORPHONE HYDROCHLORIDE 1 MG/ML
1 INJECTION, SOLUTION INTRAMUSCULAR; INTRAVENOUS; SUBCUTANEOUS ONCE
Status: COMPLETED | OUTPATIENT
Start: 2023-06-04 | End: 2023-06-04

## 2023-06-04 RX ORDER — ONDANSETRON 2 MG/ML
4 INJECTION INTRAMUSCULAR; INTRAVENOUS
Status: COMPLETED | OUTPATIENT
Start: 2023-06-04 | End: 2023-06-04

## 2023-06-04 RX ORDER — MORPHINE SULFATE 4 MG/ML
4 INJECTION, SOLUTION INTRAMUSCULAR; INTRAVENOUS EVERY 4 HOURS PRN
Status: DISCONTINUED | OUTPATIENT
Start: 2023-06-04 | End: 2023-06-04

## 2023-06-04 RX ORDER — SODIUM CHLORIDE 9 MG/ML
INJECTION, SOLUTION INTRAVENOUS CONTINUOUS
Status: DISCONTINUED | OUTPATIENT
Start: 2023-06-04 | End: 2023-06-05

## 2023-06-04 RX ORDER — ROCURONIUM BROMIDE 10 MG/ML
INJECTION, SOLUTION INTRAVENOUS
Status: DISCONTINUED | OUTPATIENT
Start: 2023-06-04 | End: 2023-06-04

## 2023-06-04 RX ORDER — MEPERIDINE HYDROCHLORIDE 25 MG/ML
12.5 INJECTION INTRAMUSCULAR; INTRAVENOUS; SUBCUTANEOUS ONCE AS NEEDED
Status: ACTIVE | OUTPATIENT
Start: 2023-06-04 | End: 2023-06-05

## 2023-06-04 RX ORDER — HYDROMORPHONE HYDROCHLORIDE 2 MG/ML
0.4 INJECTION, SOLUTION INTRAMUSCULAR; INTRAVENOUS; SUBCUTANEOUS EVERY 5 MIN PRN
Status: DISCONTINUED | OUTPATIENT
Start: 2023-06-04 | End: 2023-06-05 | Stop reason: HOSPADM

## 2023-06-04 RX ORDER — LIDOCAINE HYDROCHLORIDE 20 MG/ML
INJECTION INTRAVENOUS
Status: DISCONTINUED | OUTPATIENT
Start: 2023-06-04 | End: 2023-06-04

## 2023-06-04 RX ORDER — ONDANSETRON 2 MG/ML
4 INJECTION INTRAMUSCULAR; INTRAVENOUS EVERY 8 HOURS PRN
Status: DISCONTINUED | OUTPATIENT
Start: 2023-06-04 | End: 2023-06-05 | Stop reason: HOSPADM

## 2023-06-04 RX ORDER — SODIUM CHLORIDE 0.9 % (FLUSH) 0.9 %
10 SYRINGE (ML) INJECTION EVERY 8 HOURS PRN
Status: DISCONTINUED | OUTPATIENT
Start: 2023-06-04 | End: 2023-06-05 | Stop reason: HOSPADM

## 2023-06-04 RX ORDER — OXYCODONE HYDROCHLORIDE 5 MG/1
5 TABLET ORAL
Status: DISCONTINUED | OUTPATIENT
Start: 2023-06-04 | End: 2023-06-05

## 2023-06-04 RX ORDER — NALOXONE HCL 0.4 MG/ML
0.02 VIAL (ML) INJECTION
Status: DISCONTINUED | OUTPATIENT
Start: 2023-06-04 | End: 2023-06-05 | Stop reason: HOSPADM

## 2023-06-04 RX ORDER — MORPHINE SULFATE 4 MG/ML
4 INJECTION, SOLUTION INTRAMUSCULAR; INTRAVENOUS EVERY 4 HOURS PRN
Status: DISCONTINUED | OUTPATIENT
Start: 2023-06-04 | End: 2023-06-05 | Stop reason: HOSPADM

## 2023-06-04 RX ORDER — BUPIVACAINE HCL/EPINEPHRINE 0.25-.0005
VIAL (ML) INJECTION
Status: DISCONTINUED | OUTPATIENT
Start: 2023-06-04 | End: 2023-06-04 | Stop reason: HOSPADM

## 2023-06-04 RX ORDER — DEXAMETHASONE SODIUM PHOSPHATE 4 MG/ML
INJECTION, SOLUTION INTRA-ARTICULAR; INTRALESIONAL; INTRAMUSCULAR; INTRAVENOUS; SOFT TISSUE
Status: DISCONTINUED | OUTPATIENT
Start: 2023-06-04 | End: 2023-06-04

## 2023-06-04 RX ORDER — PROCHLORPERAZINE EDISYLATE 5 MG/ML
5 INJECTION INTRAMUSCULAR; INTRAVENOUS EVERY 30 MIN PRN
Status: DISCONTINUED | OUTPATIENT
Start: 2023-06-04 | End: 2023-06-05 | Stop reason: HOSPADM

## 2023-06-04 RX ORDER — FENTANYL CITRATE 50 UG/ML
INJECTION, SOLUTION INTRAMUSCULAR; INTRAVENOUS
Status: DISCONTINUED | OUTPATIENT
Start: 2023-06-04 | End: 2023-06-04

## 2023-06-04 RX ORDER — MORPHINE SULFATE 2 MG/ML
2 INJECTION, SOLUTION INTRAMUSCULAR; INTRAVENOUS
Status: COMPLETED | OUTPATIENT
Start: 2023-06-04 | End: 2023-06-04

## 2023-06-04 RX ORDER — PROPOFOL 10 MG/ML
VIAL (ML) INTRAVENOUS
Status: DISCONTINUED | OUTPATIENT
Start: 2023-06-04 | End: 2023-06-04

## 2023-06-04 RX ORDER — SODIUM CHLORIDE 0.9 % (FLUSH) 0.9 %
3 SYRINGE (ML) INJECTION
Status: DISCONTINUED | OUTPATIENT
Start: 2023-06-04 | End: 2023-06-05 | Stop reason: HOSPADM

## 2023-06-04 RX ADMIN — DEXAMETHASONE SODIUM PHOSPHATE 4 MG: 4 INJECTION, SOLUTION INTRAMUSCULAR; INTRAVENOUS at 01:06

## 2023-06-04 RX ADMIN — SODIUM CHLORIDE, SODIUM LACTATE, POTASSIUM CHLORIDE, AND CALCIUM CHLORIDE: .6; .31; .03; .02 INJECTION, SOLUTION INTRAVENOUS at 12:06

## 2023-06-04 RX ADMIN — PIPERACILLIN AND TAZOBACTAM 4.5 G: 4; .5 INJECTION, POWDER, LYOPHILIZED, FOR SOLUTION INTRAVENOUS; PARENTERAL at 12:06

## 2023-06-04 RX ADMIN — OXYCODONE HYDROCHLORIDE 5 MG: 5 TABLET ORAL at 09:06

## 2023-06-04 RX ADMIN — LIDOCAINE HYDROCHLORIDE 100 MG: 20 INJECTION, SOLUTION INTRAVENOUS at 12:06

## 2023-06-04 RX ADMIN — PROPOFOL 200 MG: 10 INJECTION, EMULSION INTRAVENOUS at 12:06

## 2023-06-04 RX ADMIN — ONDANSETRON 4 MG: 2 INJECTION INTRAMUSCULAR; INTRAVENOUS at 03:06

## 2023-06-04 RX ADMIN — PIPERACILLIN AND TAZOBACTAM 4.5 G: 4; .5 INJECTION, POWDER, LYOPHILIZED, FOR SOLUTION INTRAVENOUS; PARENTERAL at 09:06

## 2023-06-04 RX ADMIN — MORPHINE SULFATE 2 MG: 2 INJECTION, SOLUTION INTRAMUSCULAR; INTRAVENOUS at 03:06

## 2023-06-04 RX ADMIN — ONDANSETRON 4 MG: 2 INJECTION INTRAMUSCULAR; INTRAVENOUS at 09:06

## 2023-06-04 RX ADMIN — OXYCODONE HYDROCHLORIDE 5 MG: 5 TABLET ORAL at 07:06

## 2023-06-04 RX ADMIN — FENTANYL CITRATE 50 MCG: 50 INJECTION, SOLUTION INTRAMUSCULAR; INTRAVENOUS at 01:06

## 2023-06-04 RX ADMIN — MORPHINE SULFATE 6 MG: 2 INJECTION, SOLUTION INTRAMUSCULAR; INTRAVENOUS at 03:06

## 2023-06-04 RX ADMIN — SUGAMMADEX 200 MG: 100 INJECTION, SOLUTION INTRAVENOUS at 01:06

## 2023-06-04 RX ADMIN — MORPHINE SULFATE 6 MG: 2 INJECTION, SOLUTION INTRAMUSCULAR; INTRAVENOUS at 06:06

## 2023-06-04 RX ADMIN — ROCURONIUM BROMIDE 20 MG: 10 INJECTION INTRAVENOUS at 12:06

## 2023-06-04 RX ADMIN — HYDROMORPHONE HYDROCHLORIDE 1 MG: 1 INJECTION, SOLUTION INTRAMUSCULAR; INTRAVENOUS; SUBCUTANEOUS at 09:06

## 2023-06-04 RX ADMIN — SODIUM CHLORIDE: 9 INJECTION, SOLUTION INTRAVENOUS at 07:06

## 2023-06-04 RX ADMIN — SODIUM CHLORIDE 1000 ML: 9 INJECTION, SOLUTION INTRAVENOUS at 03:06

## 2023-06-04 RX ADMIN — ROCURONIUM BROMIDE 50 MG: 10 INJECTION INTRAVENOUS at 12:06

## 2023-06-04 RX ADMIN — PIPERACILLIN AND TAZOBACTAM 4.5 G: 4; .5 INJECTION, POWDER, LYOPHILIZED, FOR SOLUTION INTRAVENOUS; PARENTERAL at 05:06

## 2023-06-04 RX ADMIN — DEXAMETHASONE SODIUM PHOSPHATE 4 MG: 4 INJECTION, SOLUTION INTRAMUSCULAR; INTRAVENOUS at 12:06

## 2023-06-04 RX ADMIN — FENTANYL CITRATE 100 MCG: 50 INJECTION, SOLUTION INTRAMUSCULAR; INTRAVENOUS at 12:06

## 2023-06-04 NOTE — CONSULTS
HISTORY AND PHYSICAL  OBSTETRICS & GYNECOLOGY          Subjective:    Marilu Frausto is a 42 y.o.  at 11w 6d fabian intrauterine pregnancy who presents to ED for severe RLQ pain and n/v. The pain began last night and has worsened in intensity. It is described as sharp. She denies any vaginal bleeding or cramping pain. This was an unplanned, desired pregnancy. This pregnancy is complicated by advanced maternal age.       Review of Systems   Constitutional:  Negative for chills and fever.   HENT: Negative.     Eyes:  Negative for visual disturbance.   Respiratory:  Negative for shortness of breath.    Cardiovascular:  Negative for chest pain and palpitations.   Gastrointestinal:  Positive for abdominal pain, nausea and vomiting. Negative for bloating, constipation and diarrhea.   Genitourinary:  Negative for vaginal bleeding, vaginal discharge and vaginal pain.   Musculoskeletal:  Negative for back pain and myalgias.   Neurological:  Negative for headaches.   Hematological: Negative.    Psychiatric/Behavioral:  The patient is not nervous/anxious.      Past Medical/Surgical Hx: reviewed in chart  Social/Family Hx: reviewed in chart  Allergies: reviewed in chart  Meds:   Medications Prior to Admission   Medication Sig Dispense Refill Last Dose    ezetimibe (ZETIA) 10 mg tablet Take 1 tablet (10 mg total) by mouth once daily. (Patient not taking: Reported on 2022) 90 tablet 2     folic acid (FOLVITE) 1 MG tablet Take 4 tablets (4 mg total) by mouth once daily. 120 tablet 2     INNOPRAN  mg Cp24 TK 1 C PO QAM  6     TEGRETOL  mg 12 hr tablet TK 3 TS PO BID  6      OBHx:   OB History    Para Term  AB Living   3 2 2 0 0 2   SAB IAB Ectopic Multiple Live Births   0 0 0 0 2      # Outcome Date GA Lbr Carlos/2nd Weight Sex Delivery Anes PTL Lv   3 Current            2 Term 2013 39w0d  4.082 kg (9 lb)  CS-LTranv  N YASSINE      Birth Comments: 9#   1 Term 2009 37w0d  2.92 kg (6 lb 7  oz)  CS-LTranv  N YASSINE      Birth Comments: Patient was induced and broke patient's ribs      Complications: Failure to Progress in Second Stage       Objective:    /64 (BP Location: Right arm, Patient Position: Lying)   Pulse 90   Temp 98.5 °F (36.9 °C) (Oral)   Resp 18   Ht 5' (1.524 m)   Wt 72.6 kg (160 lb)   LMP 03/15/2023 (Exact Date)   SpO2 96%   BMI 31.25 kg/m²   Physical Exam  Constitutional:       Appearance: She is well-developed.   HENT:      Head: Normocephalic.   Eyes:      General: No scleral icterus.     Extraocular Movements: Extraocular movements intact.   Cardiovascular:      Rate and Rhythm: Normal rate.   Pulmonary:      Effort: Pulmonary effort is normal. No respiratory distress.   Abdominal:      General: A surgical scar is present. There is no distension.      Palpations: Abdomen is soft.      Tenderness: There is abdominal tenderness in the right lower quadrant. There is guarding.   Musculoskeletal:         General: Normal range of motion.      Cervical back: Normal range of motion.   Neurological:      Mental Status: She is alert and oriented to person, place, and time.   Skin:     General: Skin is warm and dry.   Psychiatric:         Mood and Affect: Mood normal.         Behavior: Behavior normal.   Vitals reviewed.        Lab Review  Lab Results   Component Value Date    WBC 16.72 (H) 06/04/2023    HGB 12.0 06/04/2023    HCT 36.4 (L) 06/04/2023    MCV 92 06/04/2023     06/04/2023        BMP  Lab Results   Component Value Date     06/04/2023    K 3.6 06/04/2023     06/04/2023    CO2 18 (L) 06/04/2023    BUN 7 06/04/2023    CREATININE 0.7 06/04/2023    CALCIUM 8.4 (L) 06/04/2023    ANIONGAP 12 06/04/2023    ESTGFRAFRICA >60 12/22/2021    EGFRNONAA >60 12/22/2021        Imaging  MRI Abdomen Pelvis Without Contrast (XPD)  Narrative: EXAMINATION:  MRI ABDOMEN PELVIS WITHOUT CONTRAST (XPD)    CLINICAL HISTORY:  RLQ abdominal pain (Age >= 14y);    TECHNIQUE:  MRI  examination of the abdomen and pelvis was performed.  Intravenous contrast was not utilized.  There is mild artifact present.    COMPARISON:  None    FINDINGS:  There is no evidence for acute process of the visualized portions of the stomach, liver, gallbladder, pancreas, spleen.  There is no hydronephrosis or obstructive uropathy.  The abdominal aorta appears normal in caliber.  The urinary bladder appears appropriate for distention.  An intrauterine pregnancy is noted.    The appendix is identified, it is seen arising from the cecum and extending anteriorly superiorly, as seen on series 3 axial images 25 through 38. It appears distended, measuring up to approximately 10.6 mm in transverse thickness, there is appearance of appendiceal wall thickening measuring up to approximately 3.5 mm.  On the STIR imaging and the T2 fat saturation imaging there is appearance of increased signal intensity involving the appendiceal wall.  The findings are concerning for acute appendicitis.    There is no evidence for small bowel or colonic obstructive change.  Impression: Abnormal appearance of the appendix consistent with acute appendicitis as discussed above.    This report was flagged in Epic as abnormal.    Electronically signed by: Aurelio Lux  Date:    06/04/2023  Time:    05:03        Assessment:         Active Hospital Problems    Diagnosis  POA    *Acute appendicitis [K35.80]  Yes    12 weeks gestation of pregnancy [Z3A.12]  Not Applicable    Epilepsy [G40.909]  Yes    Migraine without status migrainosus, not intractable [G43.909]  Yes      Resolved Hospital Problems   No resolved problems to display.        Plan:   First trimester pregnancy  - Dating established by previous ultrasound  - No ob specific complaints (bleeding, cramping)  - Bedside ultrasound confirmed   - Pregnancy complicated by AMA    2. Appendicitis  - Dx by MRI  - Acute RLQ tenderness on exam  - Will defer management to primary team. If  surgical management is warranted, we agree with this plan. Surgical management should not not be deferred due to pregnancy.   - I discussed the risk of miscarriage in the setting of surgery in the first trimester is low (although lowest in the second trimester), management of appendicitis should not be deferred due to pregnancy. If miscarriage does occur, there are no management options to stop a threatened or incomplete miscarriage.  - Please contact to follow up after surgery, if it does occur.    SHARMAINE Aguilar MD  OBGYN PGY-4

## 2023-06-04 NOTE — SUBJECTIVE & OBJECTIVE
Past Medical History:   Diagnosis Date    Epilepsy     Migraines        Past Surgical History:   Procedure Laterality Date     SECTION      c/s x 2       Review of patient's allergies indicates:  No Known Allergies    No current facility-administered medications on file prior to encounter.     Current Outpatient Medications on File Prior to Encounter   Medication Sig    ezetimibe (ZETIA) 10 mg tablet Take 1 tablet (10 mg total) by mouth once daily. (Patient not taking: Reported on 2022)    folic acid (FOLVITE) 1 MG tablet Take 4 tablets (4 mg total) by mouth once daily.    INNOPRAN  mg Cp24 TK 1 C PO QAM    TEGRETOL  mg 12 hr tablet TK 3 TS PO BID    [DISCONTINUED] albuterol (VENTOLIN HFA) 90 mcg/actuation inhaler Inhale 2 puffs into the lungs every 6 (six) hours as needed for Wheezing. Rescue (Patient not taking: Reported on 1/3/2023)    [DISCONTINUED] fluticasone propionate (FLONASE) 50 mcg/actuation nasal spray 2 sprays (100 mcg total) by Each Nostril route once daily.    [DISCONTINUED] loratadine (CLARITIN) 10 mg tablet Take 1 tablet (10 mg total) by mouth once daily.    [DISCONTINUED] ondansetron (ZOFRAN-ODT) 4 MG TbDL Take 1 tablet (4 mg total) by mouth every 6 (six) hours as needed (nausea). (Patient not taking: Reported on 2022)     Family History    Reviewed.  No pertinent family history.       Tobacco Use    Smoking status: Former     Types: Cigarettes     Quit date: 2021     Years since quittin.4    Smokeless tobacco: Never   Substance and Sexual Activity    Alcohol use: Not Currently    Drug use: Not on file    Sexual activity: Not on file     Review of Systems   Constitutional:  Negative for chills and fever.   HENT:  Negative for congestion, hearing loss, sinus pressure and trouble swallowing.    Eyes:  Negative for photophobia, pain, redness and visual disturbance.   Respiratory:  Negative for cough, chest tightness, shortness of breath and wheezing.     Cardiovascular:  Negative for chest pain and palpitations.   Gastrointestinal:  Positive for abdominal pain, nausea and vomiting. Negative for abdominal distention, blood in stool, constipation and diarrhea.   Endocrine: Negative for cold intolerance, heat intolerance, polydipsia, polyphagia and polyuria.   Genitourinary:  Negative for dysuria and frequency.   Musculoskeletal:  Negative for arthralgias, back pain, gait problem, joint swelling, myalgias and neck pain.   Allergic/Immunologic: Negative for environmental allergies, food allergies and immunocompromised state.   Neurological:  Negative for dizziness, seizures, syncope, weakness, light-headedness and headaches.   Hematological:  Negative for adenopathy.   Psychiatric/Behavioral:  Negative for agitation, behavioral problems and confusion.    Objective:     Vital Signs (Most Recent):  Temp: 98.6 °F (37 °C) (06/04/23 0600)  Pulse: 72 (06/04/23 0600)  Resp: 18 (06/04/23 0604)  BP: 111/67 (06/04/23 0600)  SpO2: 100 % (06/04/23 0600) Vital Signs (24h Range):  Temp:  [97.9 °F (36.6 °C)-98.6 °F (37 °C)] 98.6 °F (37 °C)  Pulse:  [72-77] 72  Resp:  [16-20] 18  SpO2:  [98 %-100 %] 100 %  BP: (111-113)/(67-69) 111/67     Weight: 72.6 kg (160 lb)  Body mass index is 31.25 kg/m².     Physical Exam  Constitutional:       General: She is not in acute distress.     Appearance: She is ill-appearing. She is not diaphoretic.      Comments: Uncomfortable appearing   HENT:      Head: Normocephalic and atraumatic.      Nose: Nose normal.      Mouth/Throat:      Pharynx: No oropharyngeal exudate.   Eyes:      General: No scleral icterus.        Right eye: No discharge.         Left eye: No discharge.      Conjunctiva/sclera: Conjunctivae normal.      Pupils: Pupils are equal, round, and reactive to light.   Neck:      Thyroid: No thyromegaly.      Vascular: No JVD.      Trachea: No tracheal deviation.   Cardiovascular:      Rate and Rhythm: Normal rate and regular rhythm.       Heart sounds: Normal heart sounds. No murmur heard.    No friction rub. No gallop.   Pulmonary:      Effort: Pulmonary effort is normal. No respiratory distress.      Breath sounds: Normal breath sounds. No stridor. No wheezing or rales.   Chest:      Chest wall: No tenderness.   Abdominal:      General: Bowel sounds are normal. There is no distension.      Palpations: Abdomen is soft. There is no mass.      Tenderness: There is abdominal tenderness. There is no guarding or rebound.      Comments: Right lower quadrant tenderness   Musculoskeletal:         General: No tenderness or deformity. Normal range of motion.      Cervical back: Normal range of motion and neck supple.   Lymphadenopathy:      Cervical: No cervical adenopathy.   Skin:     General: Skin is warm and dry.      Coloration: Skin is not pale.      Findings: No erythema or rash.   Neurological:      Mental Status: She is alert and oriented to person, place, and time.      Cranial Nerves: No cranial nerve deficit.      Motor: No abnormal muscle tone.      Coordination: Coordination normal.      Deep Tendon Reflexes: Reflexes are normal and symmetric. Reflexes normal.   Psychiatric:         Behavior: Behavior normal.         Thought Content: Thought content normal.         Judgment: Judgment normal.            CRANIAL NERVES     CN III, IV, VI   Pupils are equal, round, and reactive to light.     Significant Labs: All pertinent labs within the past 24 hours have been reviewed.    Significant Imaging: I have reviewed all pertinent imaging results/findings within the past 24 hours.

## 2023-06-04 NOTE — NURSING TRANSFER
Nursing Transfer Note      6/4/2023     Reason patient is being transferred: post op    Transfer To: room 374    Transfer via bed    Transfer with     Transported by rn    Telemetry:     Medicines sent: no    Any special needs or follow-up needed: no    Chart send with patient: Yes    Notified: spouse    Patient reassessed at:  (date, time)  1  Upon arrival to floor:

## 2023-06-04 NOTE — HPI
42 y.o. 12 week pregnant woman who presents with one day of acute onset nausea associated with right sided abdominal pain.  She reports anorexia and worsening pain.  She presented to the ED and was noted to have leukocytosis with a left shift and MRI confirmed acute appendicitis without radiographic signs of rupture.  She has had prior c-sections, no other abdominal surgery.

## 2023-06-04 NOTE — HOSPITAL COURSE
Patient is a 42-year-old woman with history of seizure disorder, migraines, with early pregnancy (12 weeks) who admitted the hospital for treatment of acute appendicitis.  Patient treated intravenous antibiotics.  Patient continue on her home antiseizure regimen.  Patient underwent laparoscopic appendectomy performed by Dr. Brandee Arriaza on 6/4/2023. Patient tolerated surgery well without complication.  Antibiotic therapy discontinued.  Diet successfully advanced and postoperative pain adequate control with oral pain medication.  Obstetrics service consult for management of pregnancy.  Patient normal fetal tones before and after surgery.  Outpatient follow-up with her obstetrician Dr. Aleyda Méndez recommended.

## 2023-06-04 NOTE — CARE UPDATE
Post op FHT 160s on US. No vaginal bleeding.     No need for repeat FHT remainder of admission. Can follow up with primary OB outpatient.     SHARMAINE Aguilar MD  OBGYN PGY-4

## 2023-06-04 NOTE — ANESTHESIA POSTPROCEDURE EVALUATION
Anesthesia Post Evaluation    Patient: Marilu Frausto    Procedure(s) Performed: Procedure(s) (LRB):  APPENDECTOMY, LAPAROSCOPIC (N/A)    Final Anesthesia Type: general      Patient location during evaluation: PACU  Patient participation: Yes- Able to Participate  Level of consciousness: awake and alert  Post-procedure vital signs: reviewed and stable  Pain management: adequate  Airway patency: patent    PONV status at discharge: No PONV  Anesthetic complications: no      Cardiovascular status: blood pressure returned to baseline  Respiratory status: unassisted  Hydration status: euvolemic  Follow-up not needed.          Vitals Value Taken Time   /60 06/04/23 1426   Temp 36.5 °C (97.7 °F) 06/04/23 1415   Pulse 87 06/04/23 1426   Resp 19 06/04/23 1415   SpO2 93 % 06/04/23 1426   Vitals shown include unvalidated device data.      Event Time   Out of Recovery 14:28:09         Pain/Grisel Score: Pain Rating Prior to Med Admin: 3 (6/4/2023  2:00 PM)  Pain Rating Post Med Admin: 5 (6/4/2023  6:42 AM)  Grisel Score: 9 (6/4/2023  2:20 PM)

## 2023-06-04 NOTE — CONSULTS
[facility] ([address])  General Surgery  Consult Note    Patient Name: Marilu Frausto  MRN: [id number]  Code Status: Full Code  Admission Date: 6/4/2023  Hospital Length of Stay: 0 days  Attending Physician: Ever Mcgee MD  Primary Care Provider: Amrik Owen MD    Patient information was obtained from patient, spouse/SO, parent, past medical records, ER records and primary team.     Inpatient consult to General Surgery  Consult performed by: Brandee Arriaza MD  Consult ordered by: Juan Jose Yuen NP  Reason for consult: acute appendicitis        Subjective:     Principal Problem: Acute appendicitis    History of Present Illness: 42 y.o. 12 week pregnant woman who presents with one day of acute onset nausea associated with right sided abdominal pain.  She reports anorexia and worsening pain.  She presented to the ED and was noted to have leukocytosis with a left shift and MRI confirmed acute appendicitis without radiographic signs of rupture.  She has had prior c-sections, no other abdominal surgery.        No current facility-administered medications on file prior to encounter.     Current Outpatient Medications on File Prior to Encounter   Medication Sig    ezetimibe (ZETIA) 10 mg tablet Take 1 tablet (10 mg total) by mouth once daily. (Patient not taking: Reported on 12/27/2022)    folic acid (FOLVITE) 1 MG tablet Take 4 tablets (4 mg total) by mouth once daily.    INNOPRAN  mg Cp24 TK 1 C PO QAM    TEGRETOL  mg 12 hr tablet TK 3 TS PO BID    [DISCONTINUED] albuterol (VENTOLIN HFA) 90 mcg/actuation inhaler Inhale 2 puffs into the lungs every 6 (six) hours as needed for Wheezing. Rescue (Patient not taking: Reported on 1/3/2023)    [DISCONTINUED] fluticasone propionate (FLONASE) 50 mcg/actuation nasal spray 2 sprays (100 mcg total) by Each Nostril route once daily.    [DISCONTINUED] loratadine (CLARITIN) 10 mg tablet Take 1 tablet (10 mg total) by mouth once daily.     [DISCONTINUED] ondansetron (ZOFRAN-ODT) 4 MG TbDL Take 1 tablet (4 mg total) by mouth every 6 (six) hours as needed (nausea). (Patient not taking: Reported on 2022)       Review of patient's allergies indicates:  No Known Allergies    Past Medical History:   Diagnosis Date    Epilepsy     Migraines      Past Surgical History:   Procedure Laterality Date     SECTION      c/s x 2     Family History    None       Tobacco Use    Smoking status: Former     Types: Cigarettes     Quit date: 2021     Years since quittin.4    Smokeless tobacco: Never   Substance and Sexual Activity    Alcohol use: Not Currently    Drug use: Not on file    Sexual activity: Not on file     Review of Systems   Constitutional:  Positive for appetite change. Negative for chills and fever.   Gastrointestinal:  Positive for abdominal pain and nausea. Negative for vomiting.   All other systems reviewed and are negative.  Objective:     Vital Signs (Most Recent):  Temp: 98.5 °F (36.9 °C) (23 08)  Pulse: 90 (23 08)  Resp: 18 (23 0948)  BP: 129/64 (23)  SpO2: 96 % (23 08) Vital Signs (24h Range):  Temp:  [97.9 °F (36.6 °C)-98.6 °F (37 °C)] 98.5 °F (36.9 °C)  Pulse:  [72-90] 90  Resp:  [16-20] 18  SpO2:  [96 %-100 %] 96 %  BP: (111-129)/(61-69) 129/64     Weight: 72.6 kg (160 lb)  Body mass index is 31.25 kg/m².     Physical Exam  Constitutional:       General: She is not in acute distress.  HENT:      Nose: No rhinorrhea.      Mouth/Throat:      Mouth: Mucous membranes are dry.   Eyes:      General: No scleral icterus.  Cardiovascular:      Rate and Rhythm: Normal rate.   Pulmonary:      Effort: Pulmonary effort is normal. No respiratory distress.   Abdominal:      Palpations: Abdomen is soft.      Tenderness: There is abdominal tenderness. There is guarding (focal right lower quadrant guarding with positive Rovsing's sign).      Comments: gravid   Skin:     General: Skin is warm  and dry.   Neurological:      Mental Status: She is alert.      Comments: Grossly intact          I have reviewed all pertinent lab results within the past 24 hours.  CBC:   Recent Labs   Lab 06/04/23 0619   WBC 16.72*   RBC 3.94*   HGB 12.0   HCT 36.4*      MCV 92   MCH 30.5   MCHC 33.0     CMP:   Recent Labs   Lab 06/04/23 0619   *   CALCIUM 8.4*   ALBUMIN 3.0*   PROT 6.5      K 3.6   CO2 18*      BUN 7   CREATININE 0.7   ALKPHOS 91   ALT 16   AST 13   BILITOT 0.3     LFTs:   Recent Labs   Lab 06/04/23  0619   ALT 16   AST 13   ALKPHOS 91   BILITOT 0.3   PROT 6.5   ALBUMIN 3.0*     Coagulation: No results for input(s): LABPROT, INR, APTT in the last 168 hours.  Cardiac markers: No results for input(s): CKMB, CPKMB, TROPONINT, TROPONINI, MYOGLOBIN in the last 168 hours.  Recent Labs   Lab 06/04/23 0612   COLORU Yellow   SPECGRAV 1.030   PHUR 7.0   PROTEINUA Trace*   NITRITE Negative   LEUKOCYTESUR Negative   UROBILINOGEN Negative       Significant Diagnostics:  I have reviewed all pertinent imaging results/findings within the past 24 hours.  I have reviewed and interpreted all pertinent imaging results/findings within the past 24 hours.  MRI demonstrates dilation of the appendix with surrounding inflammation and no significant free fluid, no free air      Assessment/Plan:     * Acute appendicitis  Acute appendicitis with focal peritonitis.    - NPO except for medications  - IV fluid hydration  - IV antibiotics  - OR for laparoscopic possible open appendectomy    12 weeks gestation of pregnancy  Fetal heart tones done preop.    - Fetal heart tones post op per ObGyn    Epilepsy  - Continue current home medications      VTE Risk Mitigation (From admission, onward)         Ordered     IP VTE HIGH RISK PATIENT  Once         06/04/23 0554     Place sequential compression device  Until discontinued         06/04/23 0554     Reason for No Pharmacological VTE Prophylaxis  Once        Question:   Reasons:  Answer:  Risk of Bleeding    06/04/23 0554                Thank you for your consult.      Brandee Arriaza MD  General Surgery  [facility] ([address])

## 2023-06-04 NOTE — SUBJECTIVE & OBJECTIVE
No current facility-administered medications on file prior to encounter.     Current Outpatient Medications on File Prior to Encounter   Medication Sig    ezetimibe (ZETIA) 10 mg tablet Take 1 tablet (10 mg total) by mouth once daily. (Patient not taking: Reported on 2022)    folic acid (FOLVITE) 1 MG tablet Take 4 tablets (4 mg total) by mouth once daily.    INNOPRAN  mg Cp24 TK 1 C PO QAM    TEGRETOL  mg 12 hr tablet TK 3 TS PO BID    [DISCONTINUED] albuterol (VENTOLIN HFA) 90 mcg/actuation inhaler Inhale 2 puffs into the lungs every 6 (six) hours as needed for Wheezing. Rescue (Patient not taking: Reported on 1/3/2023)    [DISCONTINUED] fluticasone propionate (FLONASE) 50 mcg/actuation nasal spray 2 sprays (100 mcg total) by Each Nostril route once daily.    [DISCONTINUED] loratadine (CLARITIN) 10 mg tablet Take 1 tablet (10 mg total) by mouth once daily.    [DISCONTINUED] ondansetron (ZOFRAN-ODT) 4 MG TbDL Take 1 tablet (4 mg total) by mouth every 6 (six) hours as needed (nausea). (Patient not taking: Reported on 2022)       Review of patient's allergies indicates:  No Known Allergies    Past Medical History:   Diagnosis Date    Epilepsy     Migraines      Past Surgical History:   Procedure Laterality Date     SECTION      c/s x 2     Family History    None       Tobacco Use    Smoking status: Former     Types: Cigarettes     Quit date: 2021     Years since quittin.4    Smokeless tobacco: Never   Substance and Sexual Activity    Alcohol use: Not Currently    Drug use: Not on file    Sexual activity: Not on file     Review of Systems   Constitutional:  Positive for appetite change. Negative for chills and fever.   Gastrointestinal:  Positive for abdominal pain and nausea. Negative for vomiting.   All other systems reviewed and are negative.  Objective:     Vital Signs (Most Recent):  Temp: 98.5 °F (36.9 °C) (23)  Pulse: 90 (23)  Resp: 18 (23  0948)  BP: 129/64 (06/04/23 0823)  SpO2: 96 % (06/04/23 0823) Vital Signs (24h Range):  Temp:  [97.9 °F (36.6 °C)-98.6 °F (37 °C)] 98.5 °F (36.9 °C)  Pulse:  [72-90] 90  Resp:  [16-20] 18  SpO2:  [96 %-100 %] 96 %  BP: (111-129)/(61-69) 129/64     Weight: 72.6 kg (160 lb)  Body mass index is 31.25 kg/m².     Physical Exam  Constitutional:       General: She is not in acute distress.  HENT:      Nose: No rhinorrhea.      Mouth/Throat:      Mouth: Mucous membranes are dry.   Eyes:      General: No scleral icterus.  Cardiovascular:      Rate and Rhythm: Normal rate.   Pulmonary:      Effort: Pulmonary effort is normal. No respiratory distress.   Abdominal:      Palpations: Abdomen is soft.      Tenderness: There is abdominal tenderness. There is guarding (focal right lower quadrant guarding with positive Rovsing's sign).      Comments: gravid   Skin:     General: Skin is warm and dry.   Neurological:      Mental Status: She is alert.      Comments: Grossly intact          I have reviewed all pertinent lab results within the past 24 hours.  CBC:   Recent Labs   Lab 06/04/23 0619   WBC 16.72*   RBC 3.94*   HGB 12.0   HCT 36.4*      MCV 92   MCH 30.5   MCHC 33.0     CMP:   Recent Labs   Lab 06/04/23 0619   *   CALCIUM 8.4*   ALBUMIN 3.0*   PROT 6.5      K 3.6   CO2 18*      BUN 7   CREATININE 0.7   ALKPHOS 91   ALT 16   AST 13   BILITOT 0.3     LFTs:   Recent Labs   Lab 06/04/23 0619   ALT 16   AST 13   ALKPHOS 91   BILITOT 0.3   PROT 6.5   ALBUMIN 3.0*     Coagulation: No results for input(s): LABPROT, INR, APTT in the last 168 hours.  Cardiac markers: No results for input(s): CKMB, CPKMB, TROPONINT, TROPONINI, MYOGLOBIN in the last 168 hours.  Recent Labs   Lab 06/04/23  0612   COLORU Yellow   SPECGRAV 1.030   PHUR 7.0   PROTEINUA Trace*   NITRITE Negative   LEUKOCYTESUR Negative   UROBILINOGEN Negative       Significant Diagnostics:  I have reviewed all pertinent imaging results/findings  within the past 24 hours.  I have reviewed and interpreted all pertinent imaging results/findings within the past 24 hours.  MRI demonstrates dilation of the appendix with surrounding inflammation and no significant free fluid, no free air

## 2023-06-04 NOTE — ASSESSMENT & PLAN NOTE
Nausea improved.  Patient states she feels she can take her seizure medication.  Continue seizure medication perioperatively to prevent seizures.

## 2023-06-04 NOTE — HPI
Patient is a 42-year-old woman with history of seizure disorder, migraines, with early pregnancy who presents to the emergency department with nausea, vomiting, and right lower quadrant pain.  Reports some mild nausea associated with pregnancy however noted significant increase in nausea at 6 pm last night followed by severe right lower quadrant that started around 10 pm last night.  Last bowel movement yesterday afternoon.  No change in her normal bowel habits.  No fevers, chills, dysuria, urinary frequency, or vaginal bleeding.    Patient reports she is on Tegretol XL (Carbamazepine 12 hours) 600 mg twice daily for her seizures.  She also takes InnoPran XL (Propranolol) for prophylaxis against migraines.  She takes a prenatal vitamin and folic acid.  She reports no known drug allergies.    Patient reports 2 prior Caesarean section but otherwise no other prior surgery.  Patient denies any vaginal spotting or bleeding.  Patient reports her last menstrual period was on 3/12/2023 which gives an estimate 12 weeks pregnancy.  She has established care with Dr. Aleyda Méndez.    Patient reports about a 20 pack-year tobacco smoking history.  She smoked about a pack day until she quit December 2021.  Denies any alcohol or substance abuse.  She works as a civilian contractor for the .

## 2023-06-04 NOTE — ED PROVIDER NOTES
Encounter Date: 2023       History     Chief Complaint   Patient presents with    Abdominal Pain     Pt began having right lower abd pain around 1800hrs last evening 6/3/2023 - pt had multiple vomiting episodes - pt is approximately 12 weeks pregnant and wants to be evaluated      42-year-old  at approximately 12 weeks gestation presents with complaint of nausea and vomiting, and right lower quadrant abdominal pain.  She reports that she has frequent episodes of nausea and vomiting with the pregnancy, and began having these symptoms this evening.  Symptoms were persistent, lasting longer than usual.  She then had gradual onset of right lower quadrant pain, rated 8/10 at its worst.  Pain worsens with movement and touch, waxes and wanes in intensity.  She denies any associated fever, chills, diarrhea, urinary symptoms, vaginal bleeding, or uterine contractions.    Review of patient's allergies indicates:  No Known Allergies  Past Medical History:   Diagnosis Date    Epilepsy     Migraines      Past Surgical History:   Procedure Laterality Date     SECTION      c/s x 2     No family history on file.  Social History     Tobacco Use    Smoking status: Former     Types: Cigarettes     Quit date: 2021     Years since quittin.4    Smokeless tobacco: Never   Substance Use Topics    Alcohol use: Not Currently     Review of Systems   Constitutional:  Negative for chills and fever.   HENT:  Negative for congestion and sore throat.    Eyes:  Negative for visual disturbance.   Respiratory:  Negative for cough and shortness of breath.    Cardiovascular:  Negative for chest pain and palpitations.   Gastrointestinal:  Positive for abdominal pain, nausea and vomiting. Negative for diarrhea.   Genitourinary:  Negative for decreased urine volume, dysuria, vaginal bleeding and vaginal discharge.   Musculoskeletal:  Negative for joint swelling, neck pain and neck stiffness.   Skin:  Negative for rash and wound.    Neurological:  Negative for weakness, numbness and headaches.   Psychiatric/Behavioral:  Negative for confusion.      Physical Exam     Initial Vitals [06/04/23 0124]   BP Pulse Resp Temp SpO2   113/69 77 16 97.9 °F (36.6 °C) 98 %      MAP       --         Physical Exam    Nursing note and vitals reviewed.  Constitutional: She appears well-developed and well-nourished. No distress.   HENT:   Head: Normocephalic and atraumatic.   Mouth/Throat: Oropharynx is clear and moist. No oropharyngeal exudate.   Eyes: Conjunctivae and EOM are normal. Pupils are equal, round, and reactive to light.   Neck: Neck supple.   Cardiovascular:  Normal rate and normal heart sounds.           No murmur heard.  Pulmonary/Chest: Breath sounds normal. No respiratory distress. She has no wheezes. She has no rhonchi. She has no rales.   Abdominal: Abdomen is soft. There is abdominal tenderness (Right lower quadrant at McBurney's point). There is guarding. There is no rebound.   Musculoskeletal:         General: No tenderness or edema.      Cervical back: Neck supple.     Neurological: She is alert and oriented to person, place, and time. She has normal strength. GCS score is 15. GCS eye subscore is 4. GCS verbal subscore is 5. GCS motor subscore is 6.   Skin: Skin is warm and dry. No rash noted.   Psychiatric: She has a normal mood and affect. Thought content normal.       ED Course   Procedures  Labs Reviewed   CBC W/ AUTO DIFFERENTIAL - Abnormal; Notable for the following components:       Result Value    WBC 17.51 (*)     MPV 8.0 (*)     Gran # (ANC) 15.0 (*)     Immature Grans (Abs) 0.07 (*)     Gran % 85.8 (*)     Lymph % 9.6 (*)     Mono % 3.8 (*)     All other components within normal limits   COMPREHENSIVE METABOLIC PANEL - Abnormal; Notable for the following components:    CO2 19 (*)     Glucose 132 (*)     Albumin 3.4 (*)     All other components within normal limits   URINALYSIS, REFLEX TO URINE CULTURE   COMPREHENSIVE METABOLIC  PANEL   MAGNESIUM   PHOSPHORUS   CBC W/ AUTO DIFFERENTIAL          Imaging Results               MRI Abdomen Pelvis Without Contrast (XPD) (Final result)  Result time 06/04/23 05:03:47      Final result by Aurelio Lux MD (06/04/23 05:03:47)                   Impression:      Abnormal appearance of the appendix consistent with acute appendicitis as discussed above.    This report was flagged in Epic as abnormal.      Electronically signed by: Aurelio Lux  Date:    06/04/2023  Time:    05:03               Narrative:    EXAMINATION:  MRI ABDOMEN PELVIS WITHOUT CONTRAST (XPD)    CLINICAL HISTORY:  RLQ abdominal pain (Age >= 14y);    TECHNIQUE:  MRI examination of the abdomen and pelvis was performed.  Intravenous contrast was not utilized.  There is mild artifact present.    COMPARISON:  None    FINDINGS:  There is no evidence for acute process of the visualized portions of the stomach, liver, gallbladder, pancreas, spleen.  There is no hydronephrosis or obstructive uropathy.  The abdominal aorta appears normal in caliber.  The urinary bladder appears appropriate for distention.  An intrauterine pregnancy is noted.    The appendix is identified, it is seen arising from the cecum and extending anteriorly superiorly, as seen on series 3 axial images 25 through 38. It appears distended, measuring up to approximately 10.6 mm in transverse thickness, there is appearance of appendiceal wall thickening measuring up to approximately 3.5 mm.  On the STIR imaging and the T2 fat saturation imaging there is appearance of increased signal intensity involving the appendiceal wall.  The findings are concerning for acute appendicitis.    There is no evidence for small bowel or colonic obstructive change.                                       Medications   sodium chloride 0.9% flush 10 mL (has no administration in time range)   acetaminophen tablet 650 mg (has no administration in time range)   naloxone 0.4 mg/mL injection  0.02 mg (has no administration in time range)   0.9%  NaCl infusion (has no administration in time range)   ondansetron injection 4 mg (has no administration in time range)   morphine injection 4 mg (has no administration in time range)   sodium chloride 0.9% bolus 1,000 mL 1,000 mL (0 mLs Intravenous Stopped 6/4/23 0605)   ondansetron injection 4 mg (4 mg Intravenous Given 6/4/23 0303)   morphine injection 2 mg (2 mg Intravenous Given 6/4/23 0303)   piperacillin-tazobactam (ZOSYN) 4.5 g in dextrose 5 % in water (D5W) 5 % 100 mL IVPB (MB+) (0 g Intravenous Stopped 6/4/23 0534)   morphine injection 6 mg (6 mg Intravenous Given 6/4/23 0344)   morphine injection 6 mg (6 mg Intravenous Given 6/4/23 0604)     Medical Decision Making:   ED Management:  Emergent evaluation a 42-year-old female who presents with complaint of nausea vomiting and right lower quadrant pain x1 day.  Vital signs are benign, afebrile.  On exam she is tender over McBurney's point with guarding.  I am concerned for possible appendicitis.  MRI shows acute appendicitis.  She is treated with antibiotics and I discussed the case with General surgery, OBGYN, and the hospitalist.  I performed a bedside ultrasound which shows normal fetal movement and cardiac activity, and she denies any contractions or bleeding - I do not suspect pregnancy related issue.  I reviewed all labs and imaging.  CBC shows leukocytosis, likely reactive, no anemia.  Metabolic profile shows no major electrolyte disturbance or renal insufficiency.  Urinalysis is pending at time of admission.  Patient did require several doses of morphine for pain control, also received fluids and antiemetics.  She is admitted to the hospitalist service in serious condition for further care and surgical intervention.           ED Course as of 06/04/23 0618   Sun Jun 04, 2023   1660 I discussed the case with General surgery Dr. Arriaza - she will see the patient, needs OR intervention. [AK]   9348 I  gama MARTIN [AK]   0600 I discussed the case with the hospitalist, will admit to Dr. Simons. [AK]      ED Course User Index  [AK] Katie De La Torre MD                 Clinical Impression:   Final diagnoses:  [K35.30] Acute appendicitis with localized peritonitis, unspecified whether abscess present, unspecified whether gangrene present, unspecified whether perforation present (Primary)  [Z34.90] Pregnancy, unspecified gestational age        ED Disposition Condition    Observation                 Katie De La Torre MD  06/04/23 0620

## 2023-06-04 NOTE — OP NOTE
[facility]  General Surgery  Operative Report         Date of Procedure: 6/4/2023     Procedure: Procedure(s) (LRB):  APPENDECTOMY, LAPAROSCOPIC (N/A)     Indications: This patient presents at 12 weeks of pregnancy with acute appendicitis.  Preoperative laboratory studies demonstrate leukocytosis with a left shift and CT imaging findings demonstrate acute appendicitis with periappendiceal inflammation without radiographic evidence of perforation.     Surgeon(s) and Role:     * Brandee Arriaza MD - Primary  Assisting Surgeon: None    Pre-Operative Diagnosis:   - Acute appendicitis with localized peritonitis, unspecified whether abscess present, unspecified whether gangrene present, unspecified whether perforation present [K35.30]  - 12 weeks gestation of pregnancy    Post-Operative Diagnosis:   - Acute appendicitis with localized peritonitis, without abscess present, with gangrene present, without perforation  - 12 weeks gestation of pregnancy    Anesthesia: General    Procedures:   Laparoscopic appendectomy    Intraoperative Findings:   Acute appendicitis, with gangrene, no luisito rupture, without abscess, with murky fluid    Description of the Procedure:  The patient was seen in the Holding Room. The risks, benefits, complications, treatment options, and expected outcomes were discussed with the patient. Possible complications were discussed with the patient, including but not limited to reaction to medication, pulmonary aspiration, bleeding, infection, hernia, the need for additional procedures, failure to diagnose a condition, creating a complication requiring transfusion or operation, perforation or damage to intraabdominal organs, need for conversion to an open procedure, stroke, death and imponderables. The patient concurred with the proposed plan and agreed to proceed despite the risks, giving informed consent.  The site of surgery was confirmed and marked. The patient was taken to operating room,  identified as Marilu Frausto and the procedure verified as laparoscopic possible open appendectomy.     Induction of general anesthesia was performed and the anesthesia team placed all appropriate lines.  A galan catheter was placed.  A comprehensive time out was performed.  The surgical field was prepped and draped in sterile fashion.  An 8mm incision was made in the right subcostal region and an optiview entry was used with the zero degree laparoscope and a 5mm optical trochar.  The abdomen was insufflated with carbon dioxide and the abdominal cavity was inspected, there was murky free fluid in the right lower quadrant and there was no evidence of injury from the initial trocar placement.  The appendix was anterior to the cecum in the right mid abdomen.  A 2-cm infraumbilical skin incision was made.  The subcutaneous tissue was bluntly dissected.  A 5mm trochar was inserted under direct visualization and upsized to a 12mm Airseal port.  An additional 5-mm trocar was placed under direct vision in the epigastric region.  0.25% marcaine was injected into each port site for localization and with a subcutaneous wheal for postoperative analgesia prior to placement of the trochars.  The appendix was identified at the confluence of the tenia at the base of the cecum.  The appendix was noted to have significant inflammatory change and gangrene with the omentum adhered to the length of the appendix without evidence of perforation.  The ligasure was used to dissect off the omentum and the appendix was elevated, a window at the base of the appendix was carefully created with blunt dissection.  The mesoappendix was and divided at the base with two blue loads of the ATS45 stapler.  A blue reload was fired across the base of the appendix, taking care to staple at the base of the appendix without injuring the cecum.  The appendix was set aside and attention was turned to inspecting the staple lines of the mesoappendix and the  appendiceal base.  The base of the appendix was examined and appeared viable and well-sealed. The appendix was placed into a laparoscopic specimen retreival bag and removed from the umbilical port site without difficulty.  The right lower quadrant was reexamined and hemostasis was confirmed.  All ports were removed under direct vision and no bleeding from any port site was noted. The insufflation of the abdomen was evacuated.  The fascial incision at the umbilical port site was closed with a 0 Vicryl stitch in a figure of eight fashion under direct laparoscopic visualization using the Andrea Juanita.  Additional Marcaine was injected at the umbilical port site, the dermis of the umbilical port site was closed with a 3-0 Vicryl and all skin incisions were closed in a subcuticular fashion with 4-0 Monocryl. Sterile skin glue was applied to the incisions. The Jaquez catheter was removed. The patient was extubated and transported to the recovery room in stable condition.  All sponge, instrument and needle counts were reported correct at the end of the case.  I was present and scrubbed for the entire procedure.    Complications: No    Estimated Blood Loss (EBL): less than 50 mL           Drains: None    Implants: None    Specimens:   Specimen (24h ago, onward)       Start     Ordered    06/04/23 1253  Specimen to Pathology, Surgery General Surgery  Once        Comments: Pre-op Diagnosis: Acute appendicitis with localized peritonitis, unspecified whether abscess present, unspecified whether gangrene present, unspecified whether perforation present [K35.30]Procedure(s):APPENDECTOMY, LAPAROSCOPIC Number of specimens: 1Name of specimens: 1. APPENDIX     References:    Click here for ordering Quick Tip   Question Answer Comment   Procedure Type: General Surgery    Specimen Class: Routine/Screening    Which provider would you like to cc? DILMA VALENZUELA    Release to patient Immediate        06/04/23 5157                             Condition: stable    Disposition: PACU - hemodynamically stable.    Attestation: I was present and scrubbed for the entire procedure

## 2023-06-04 NOTE — PLAN OF CARE
MSW met with the patient at the bedside. The patient's  & mother are also at the bedside.(George & Argelia )       Patient is alert and oriented with no communication barriers.     Patient denies having DME at home.     Patients PCP is correct on the face sheet. Patient choice pharmacy is [facility].     Patients' family will transport the patient home at discharge.     CM team will continue to follow.      06/04/23 1522   Discharge Assessment   Assessment Type Discharge Planning Assessment   Confirmed/corrected address, phone number and insurance Yes   Confirmed Demographics Correct on Facesheet   Source of Information family;health record;patient   People in Home spouse;child(ruiz), dependent   Do you expect to return to your current living situation? Yes   Do you have help at home or someone to help you manage your care at home? Yes   Who are your caregiver(s) and their phone number(s)? family   Prior to hospitilization cognitive status: Alert/Oriented   Current cognitive status: Alert/Oriented   Equipment Currently Used at Home none   Readmission within 30 days? No   Do you currently have service(s) that help you manage your care at home? No   Do you take prescription medications? Yes   Do you have prescription coverage? Yes   Do you have any problems affording any of your prescribed medications? No   Is the patient taking medications as prescribed? yes   How do you get to doctors appointments? car, drives self   Are you on dialysis? No   Do you take coumadin? No   Discharge Plan A Home with family   DME Needed Upon Discharge  none   Discharge Plan discussed with: Spouse/sig other;Patient;Parent(s)   Transition of Care Barriers None

## 2023-06-04 NOTE — PROGRESS NOTES
Pt comfortable, Vital signs within normal ranges  OB resident at bedside, FHR checked, normal findings

## 2023-06-04 NOTE — ASSESSMENT & PLAN NOTE
Consult obstetric service for evaluation and recommendations regarding management of pregnancy during acute appendicitis.

## 2023-06-04 NOTE — TRANSFER OF CARE
Anesthesia Transfer of Care Note    Patient: Marilu Frausto    Procedure(s) Performed: Procedure(s) (LRB):  APPENDECTOMY, LAPAROSCOPIC (N/A)    Patient location: PACU    Anesthesia Type: general    Transport from OR: Transported from OR on room air with adequate spontaneous ventilation    Post pain: adequate analgesia    Post assessment: no apparent anesthetic complications and tolerated procedure well    Post vital signs: stable    Level of consciousness: awake and alert    Nausea/Vomiting: no nausea/vomiting    Complications: none    Transfer of care protocol was followed      Last vitals:   Visit Vitals  /64 (BP Location: Right arm, Patient Position: Lying)   Pulse 85   Temp 36.9 °C (98.5 °F) (Oral)   Resp 18   Ht 5' (1.524 m)   Wt 72.6 kg (160 lb)   LMP 03/15/2023 (Exact Date)   SpO2 96%   BMI 31.25 kg/m²

## 2023-06-04 NOTE — H&P
[facility] Medicine  History & Physical    Patient Name: Marilu Frausto  MRN: [id number]  Patient Class: OP- Observation  Admission Date: 6/4/2023  Attending Physician: Ever Mcgee MD   Primary Care Provider: Amrik Owen MD         Patient information was obtained from patient, past medical records and ER records.     Subjective:     Principal Problem:Acute appendicitis    Chief Complaint:   Chief Complaint   Patient presents with    Abdominal Pain     Pt began having right lower abd pain around 1800hrs last evening 6/3/2023 - pt had multiple vomiting episodes - pt is approximately 12 weeks pregnant and wants to be evaluated         HPI: Patient is a 42-year-old woman with history of seizure disorder, migraines, with early pregnancy who presents to the emergency department with nausea, vomiting, and right lower quadrant pain.  Reports some mild nausea associated with pregnancy however noted significant increase in nausea at 6 pm last night followed by severe right lower quadrant that started around 10 pm last night.  Last bowel movement yesterday afternoon.  No change in her normal bowel habits.  No fevers, chills, dysuria, urinary frequency, or vaginal bleeding.    Patient reports she is on Tegretol XL (Carbamazepine 12 hours) 600 mg twice daily for her seizures.  She also takes InnoPran XL (Propranolol) for prophylaxis against migraines.  She takes a prenatal vitamin and folic acid.  She reports no known drug allergies.    Patient reports 2 prior Caesarean section but otherwise no other prior surgery.  Patient denies any vaginal spotting or bleeding.  Patient reports her last menstrual period was on 3/12/2023 which gives an estimate 12 weeks pregnancy.  She has established care with Dr. Aleyda Méndez.    Patient reports about a 20 pack-year tobacco smoking history.  She smoked about a pack day until she quit December 2021.  Denies any alcohol or substance abuse.  She works as a  civilian contractor for the .      Past Medical History:   Diagnosis Date    Epilepsy     Migraines        Past Surgical History:   Procedure Laterality Date     SECTION      c/s x 2       Review of patient's allergies indicates:  No Known Allergies    No current facility-administered medications on file prior to encounter.     Current Outpatient Medications on File Prior to Encounter   Medication Sig    ezetimibe (ZETIA) 10 mg tablet Take 1 tablet (10 mg total) by mouth once daily. (Patient not taking: Reported on 2022)    folic acid (FOLVITE) 1 MG tablet Take 4 tablets (4 mg total) by mouth once daily.    INNOPRAN  mg Cp24 TK 1 C PO QAM    TEGRETOL  mg 12 hr tablet TK 3 TS PO BID    [DISCONTINUED] albuterol (VENTOLIN HFA) 90 mcg/actuation inhaler Inhale 2 puffs into the lungs every 6 (six) hours as needed for Wheezing. Rescue (Patient not taking: Reported on 1/3/2023)    [DISCONTINUED] fluticasone propionate (FLONASE) 50 mcg/actuation nasal spray 2 sprays (100 mcg total) by Each Nostril route once daily.    [DISCONTINUED] loratadine (CLARITIN) 10 mg tablet Take 1 tablet (10 mg total) by mouth once daily.    [DISCONTINUED] ondansetron (ZOFRAN-ODT) 4 MG TbDL Take 1 tablet (4 mg total) by mouth every 6 (six) hours as needed (nausea). (Patient not taking: Reported on 2022)     Family History    Reviewed.  No pertinent family history.       Tobacco Use    Smoking status: Former     Types: Cigarettes     Quit date: 2021     Years since quittin.4    Smokeless tobacco: Never   Substance and Sexual Activity    Alcohol use: Not Currently    Drug use: Not on file    Sexual activity: Not on file     Review of Systems   Constitutional:  Negative for chills and fever.   HENT:  Negative for congestion, hearing loss, sinus pressure and trouble swallowing.    Eyes:  Negative for photophobia, pain, redness and visual disturbance.   Respiratory:  Negative for cough,  chest tightness, shortness of breath and wheezing.    Cardiovascular:  Negative for chest pain and palpitations.   Gastrointestinal:  Positive for abdominal pain, nausea and vomiting. Negative for abdominal distention, blood in stool, constipation and diarrhea.   Endocrine: Negative for cold intolerance, heat intolerance, polydipsia, polyphagia and polyuria.   Genitourinary:  Negative for dysuria and frequency.   Musculoskeletal:  Negative for arthralgias, back pain, gait problem, joint swelling, myalgias and neck pain.   Allergic/Immunologic: Negative for environmental allergies, food allergies and immunocompromised state.   Neurological:  Negative for dizziness, seizures, syncope, weakness, light-headedness and headaches.   Hematological:  Negative for adenopathy.   Psychiatric/Behavioral:  Negative for agitation, behavioral problems and confusion.    Objective:     Vital Signs (Most Recent):  Temp: 98.6 °F (37 °C) (06/04/23 0600)  Pulse: 72 (06/04/23 0600)  Resp: 18 (06/04/23 0604)  BP: 111/67 (06/04/23 0600)  SpO2: 100 % (06/04/23 0600) Vital Signs (24h Range):  Temp:  [97.9 °F (36.6 °C)-98.6 °F (37 °C)] 98.6 °F (37 °C)  Pulse:  [72-77] 72  Resp:  [16-20] 18  SpO2:  [98 %-100 %] 100 %  BP: (111-113)/(67-69) 111/67     Weight: 72.6 kg (160 lb)  Body mass index is 31.25 kg/m².     Physical Exam  Constitutional:       General: She is not in acute distress.     Appearance: She is ill-appearing. She is not diaphoretic.      Comments: Uncomfortable appearing   HENT:      Head: Normocephalic and atraumatic.      Nose: Nose normal.      Mouth/Throat:      Pharynx: No oropharyngeal exudate.   Eyes:      General: No scleral icterus.        Right eye: No discharge.         Left eye: No discharge.      Conjunctiva/sclera: Conjunctivae normal.      Pupils: Pupils are equal, round, and reactive to light.   Neck:      Thyroid: No thyromegaly.      Vascular: No JVD.      Trachea: No tracheal deviation.   Cardiovascular:       Rate and Rhythm: Normal rate and regular rhythm.      Heart sounds: Normal heart sounds. No murmur heard.    No friction rub. No gallop.   Pulmonary:      Effort: Pulmonary effort is normal. No respiratory distress.      Breath sounds: Normal breath sounds. No stridor. No wheezing or rales.   Chest:      Chest wall: No tenderness.   Abdominal:      General: Bowel sounds are normal. There is no distension.      Palpations: Abdomen is soft. There is no mass.      Tenderness: There is abdominal tenderness. There is no guarding or rebound.      Comments: Right lower quadrant tenderness   Musculoskeletal:         General: No tenderness or deformity. Normal range of motion.      Cervical back: Normal range of motion and neck supple.   Lymphadenopathy:      Cervical: No cervical adenopathy.   Skin:     General: Skin is warm and dry.      Coloration: Skin is not pale.      Findings: No erythema or rash.   Neurological:      Mental Status: She is alert and oriented to person, place, and time.      Cranial Nerves: No cranial nerve deficit.      Motor: No abnormal muscle tone.      Coordination: Coordination normal.      Deep Tendon Reflexes: Reflexes are normal and symmetric. Reflexes normal.   Psychiatric:         Behavior: Behavior normal.         Thought Content: Thought content normal.         Judgment: Judgment normal.            CRANIAL NERVES     CN III, IV, VI   Pupils are equal, round, and reactive to light.     Significant Labs: All pertinent labs within the past 24 hours have been reviewed.    Significant Imaging: I have reviewed all pertinent imaging results/findings within the past 24 hours.      Assessment/Plan:     * Acute appendicitis  Patient presents with nausea, vomiting, and acute onset of severe right lower quadrant pain with right lower quadrant tenderness on exam.  MRI of the abdomen and consistent with acute appendicitis.  Patient elevated white blood cell count.  NPO except for critical medications  with sip of water.  Continue antibiotic therapy.  Pain antiemetic medications as needed.  General surgery consulted.    12 weeks gestation of pregnancy  Consult obstetric service for evaluation and recommendations regarding management of pregnancy during acute appendicitis.    Epilepsy  Nausea improved.  Patient states she feels she can take her seizure medication.  Continue seizure medication perioperatively to prevent seizures.    Migraine without status migrainosus, not intractable  Continue long-acting propranolol 120 mg daily for prophylaxis.      DVT prophylaxis.  High risk.  Thrombo Embolic Deterrent hose (KELLEN® hose) and sequential compression devices for now pending surgical evaluation.      Ever Mcgee MD  Department of Hospital Medicine  [facility] - Emergency Dept

## 2023-06-04 NOTE — ASSESSMENT & PLAN NOTE
Acute appendicitis with focal peritonitis.    - NPO except for medications  - IV fluid hydration  - IV antibiotics  - OR for laparoscopic possible open appendectomy

## 2023-06-04 NOTE — ASSESSMENT & PLAN NOTE
Patient presents with nausea, vomiting, and acute onset of severe right lower quadrant pain with right lower quadrant tenderness on exam.  MRI of the abdomen and consistent with acute appendicitis.  Patient elevated white blood cell count.  NPO except for critical medications with sip of water.  Continue antibiotic therapy.  Pain antiemetic medications as needed.  General surgery consulted.

## 2023-06-04 NOTE — ED NOTES
Pt rounding complete.  Patient reports abdominal pain. Respirations even and unlabored. Aaox4.  Restroom and comfort needs addressed.  Pt updated on plan of care.  Call light within reach.  Will continue to monitor.

## 2023-06-04 NOTE — ANESTHESIA PROCEDURE NOTES
Intubation    Date/Time: 6/4/2023 12:30 PM  Performed by: Michael Linares CRNA  Authorized by: Arjun Villagomez MD     Intubation:     Induction:  Intravenous    Intubated:  Postinduction    Mask Ventilation:  Easy mask    Attempts:  1    Attempted By:  CRNA    Method of Intubation:  Video laryngoscopy    Blade:  Tesfaye 3    Laryngeal View Grade: Grade I - full view of cords      Difficult Airway Encountered?: No      Complications:  None    Airway Device:  Oral endotracheal tube    Airway Device Size:  7.5    Style/Cuff Inflation:  Cuffed (inflated to minimal occlusive pressure)    Tube secured:  21    Secured at:  The lips    Placement Verified By:  Capnometry    Complicating Factors:  None    Findings Post-Intubation:  BS equal bilateral and atraumatic/condition of teeth unchanged

## 2023-06-05 ENCOUNTER — TELEPHONE (OUTPATIENT)
Dept: OBSTETRICS AND GYNECOLOGY | Facility: CLINIC | Age: 43
End: 2023-06-05
Payer: COMMERCIAL

## 2023-06-05 VITALS
BODY MASS INDEX: 31.41 KG/M2 | SYSTOLIC BLOOD PRESSURE: 120 MMHG | HEIGHT: 60 IN | WEIGHT: 160 LBS | RESPIRATION RATE: 18 BRPM | TEMPERATURE: 98 F | DIASTOLIC BLOOD PRESSURE: 74 MMHG | HEART RATE: 84 BPM | OXYGEN SATURATION: 94 %

## 2023-06-05 LAB
ALBUMIN SERPL BCP-MCNC: 2.7 G/DL (ref 3.5–5.2)
ALP SERPL-CCNC: 83 U/L (ref 55–135)
ALT SERPL W/O P-5'-P-CCNC: 11 U/L (ref 10–44)
ANION GAP SERPL CALC-SCNC: 10 MMOL/L (ref 8–16)
AST SERPL-CCNC: 9 U/L (ref 10–40)
BASOPHILS # BLD AUTO: 0.05 K/UL (ref 0–0.2)
BASOPHILS NFR BLD: 0.4 % (ref 0–1.9)
BILIRUB SERPL-MCNC: 0.4 MG/DL (ref 0.1–1)
BUN SERPL-MCNC: 4 MG/DL (ref 6–20)
CALCIUM SERPL-MCNC: 8.3 MG/DL (ref 8.7–10.5)
CHLORIDE SERPL-SCNC: 108 MMOL/L (ref 95–110)
CO2 SERPL-SCNC: 18 MMOL/L (ref 23–29)
CREAT SERPL-MCNC: 0.6 MG/DL (ref 0.5–1.4)
DIFFERENTIAL METHOD: ABNORMAL
EOSINOPHIL # BLD AUTO: 0 K/UL (ref 0–0.5)
EOSINOPHIL NFR BLD: 0.2 % (ref 0–8)
ERYTHROCYTE [DISTWIDTH] IN BLOOD BY AUTOMATED COUNT: 13.4 % (ref 11.5–14.5)
EST. GFR  (NO RACE VARIABLE): >60 ML/MIN/1.73 M^2
GLUCOSE SERPL-MCNC: 114 MG/DL (ref 70–110)
HCT VFR BLD AUTO: 35.2 % (ref 37–48.5)
HGB BLD-MCNC: 11.9 G/DL (ref 12–16)
IMM GRANULOCYTES # BLD AUTO: 0.06 K/UL (ref 0–0.04)
IMM GRANULOCYTES NFR BLD AUTO: 0.4 % (ref 0–0.5)
LYMPHOCYTES # BLD AUTO: 2.2 K/UL (ref 1–4.8)
LYMPHOCYTES NFR BLD: 15.6 % (ref 18–48)
MAGNESIUM SERPL-MCNC: 1.8 MG/DL (ref 1.6–2.6)
MCH RBC QN AUTO: 31.7 PG (ref 27–31)
MCHC RBC AUTO-ENTMCNC: 33.8 G/DL (ref 32–36)
MCV RBC AUTO: 94 FL (ref 82–98)
MONOCYTES # BLD AUTO: 0.9 K/UL (ref 0.3–1)
MONOCYTES NFR BLD: 6.5 % (ref 4–15)
NEUTROPHILS # BLD AUTO: 10.8 K/UL (ref 1.8–7.7)
NEUTROPHILS NFR BLD: 76.9 % (ref 38–73)
NRBC BLD-RTO: 0 /100 WBC
PHOSPHATE SERPL-MCNC: 2.5 MG/DL (ref 2.7–4.5)
PLATELET # BLD AUTO: 406 K/UL (ref 150–450)
PMV BLD AUTO: 8.1 FL (ref 9.2–12.9)
POTASSIUM SERPL-SCNC: 3 MMOL/L (ref 3.5–5.1)
PROT SERPL-MCNC: 6.2 G/DL (ref 6–8.4)
RBC # BLD AUTO: 3.75 M/UL (ref 4–5.4)
SODIUM SERPL-SCNC: 136 MMOL/L (ref 136–145)
WBC # BLD AUTO: 14.07 K/UL (ref 3.9–12.7)

## 2023-06-05 PROCEDURE — 25000003 PHARM REV CODE 250: Performed by: NURSE PRACTITIONER

## 2023-06-05 PROCEDURE — 96361 HYDRATE IV INFUSION ADD-ON: CPT

## 2023-06-05 PROCEDURE — 25000003 PHARM REV CODE 250: Performed by: STUDENT IN AN ORGANIZED HEALTH CARE EDUCATION/TRAINING PROGRAM

## 2023-06-05 PROCEDURE — 85025 COMPLETE CBC W/AUTO DIFF WBC: CPT | Performed by: HOSPITALIST

## 2023-06-05 PROCEDURE — 25000003 PHARM REV CODE 250: Performed by: HOSPITALIST

## 2023-06-05 PROCEDURE — 63600175 PHARM REV CODE 636 W HCPCS: Performed by: STUDENT IN AN ORGANIZED HEALTH CARE EDUCATION/TRAINING PROGRAM

## 2023-06-05 PROCEDURE — 36415 COLL VENOUS BLD VENIPUNCTURE: CPT | Performed by: HOSPITALIST

## 2023-06-05 PROCEDURE — 83735 ASSAY OF MAGNESIUM: CPT | Performed by: HOSPITALIST

## 2023-06-05 PROCEDURE — 80053 COMPREHEN METABOLIC PANEL: CPT | Performed by: HOSPITALIST

## 2023-06-05 PROCEDURE — 84100 ASSAY OF PHOSPHORUS: CPT | Performed by: HOSPITALIST

## 2023-06-05 PROCEDURE — 99024 POSTOP FOLLOW-UP VISIT: CPT | Mod: ,,, | Performed by: STUDENT IN AN ORGANIZED HEALTH CARE EDUCATION/TRAINING PROGRAM

## 2023-06-05 PROCEDURE — G0378 HOSPITAL OBSERVATION PER HR: HCPCS

## 2023-06-05 PROCEDURE — 96366 THER/PROPH/DIAG IV INF ADDON: CPT

## 2023-06-05 PROCEDURE — 99024 PR POST-OP FOLLOW-UP VISIT: ICD-10-PCS | Mod: ,,, | Performed by: STUDENT IN AN ORGANIZED HEALTH CARE EDUCATION/TRAINING PROGRAM

## 2023-06-05 PROCEDURE — 25000003 PHARM REV CODE 250: Performed by: ANESTHESIOLOGY

## 2023-06-05 PROCEDURE — 99239 HOSP IP/OBS DSCHRG MGMT >30: CPT | Mod: ,,, | Performed by: HOSPITALIST

## 2023-06-05 PROCEDURE — 99239 PR HOSPITAL DISCHARGE DAY,>30 MIN: ICD-10-PCS | Mod: ,,, | Performed by: HOSPITALIST

## 2023-06-05 RX ORDER — OXYCODONE AND ACETAMINOPHEN 7.5; 325 MG/1; MG/1
1 TABLET ORAL ONCE
Status: COMPLETED | OUTPATIENT
Start: 2023-06-05 | End: 2023-06-05

## 2023-06-05 RX ORDER — OXYCODONE AND ACETAMINOPHEN 7.5; 325 MG/1; MG/1
1 TABLET ORAL EVERY 8 HOURS PRN
Qty: 9 TABLET | Refills: 0 | Status: SHIPPED | OUTPATIENT
Start: 2023-06-05 | End: 2023-06-08

## 2023-06-05 RX ORDER — POTASSIUM CHLORIDE 20 MEQ/1
40 TABLET, EXTENDED RELEASE ORAL ONCE
Status: COMPLETED | OUTPATIENT
Start: 2023-06-05 | End: 2023-06-05

## 2023-06-05 RX ADMIN — OXYCODONE HYDROCHLORIDE 5 MG: 5 TABLET ORAL at 01:06

## 2023-06-05 RX ADMIN — SODIUM CHLORIDE: 9 INJECTION, SOLUTION INTRAVENOUS at 03:06

## 2023-06-05 RX ADMIN — PIPERACILLIN AND TAZOBACTAM 4.5 G: 4; .5 INJECTION, POWDER, LYOPHILIZED, FOR SOLUTION INTRAVENOUS; PARENTERAL at 05:06

## 2023-06-05 RX ADMIN — POTASSIUM CHLORIDE 40 MEQ: 1500 TABLET, EXTENDED RELEASE ORAL at 07:06

## 2023-06-05 RX ADMIN — OXYCODONE AND ACETAMINOPHEN 1 TABLET: 7.5; 325 TABLET ORAL at 07:06

## 2023-06-05 NOTE — PLAN OF CARE
Problem:  Fall Injury Risk  Goal: Absence of Fall, Infant Drop and Related Injury  Outcome: Met     Problem: Adult Inpatient Plan of Care  Goal: Plan of Care Review  Outcome: Met  Goal: Patient-Specific Goal (Individualized)  Outcome: Met  Goal: Absence of Hospital-Acquired Illness or Injury  Outcome: Met  Goal: Optimal Comfort and Wellbeing  Outcome: Met  Goal: Readiness for Transition of Care  Outcome: Met

## 2023-06-05 NOTE — NURSING
Discharge instructions provided. Patient verbalized understanding.. iv removed. Tele box removed.. patients prescriptions have been delivered to bedside.. patient is waiting for ride to come

## 2023-06-05 NOTE — DISCHARGE SUMMARY
[facility] Surg [address] Medicine  Discharge Summary      Patient Name: Marilu Frausto  MRN: [id number]  ALIREZA: [id number]  Patient Class: OP- Observation  Admission Date: 6/4/2023  Hospital Length of Stay: 0 days  Discharge Date and Time: 6/5/2023  1:18 PM  Attending Physician: Ever Mcgee MD  Discharging Provider: Ever Mcgee MD  Primary Care Provider: Amrik Owen MD    HPI:   Patient is a 42-year-old woman with history of seizure disorder, migraines, with early pregnancy who presents to the emergency department with nausea, vomiting, and right lower quadrant pain.  Reports some mild nausea associated with pregnancy however noted significant increase in nausea at 6 pm last night followed by severe right lower quadrant that started around 10 pm last night.  Last bowel movement yesterday afternoon.  No change in her normal bowel habits.  No fevers, chills, dysuria, urinary frequency, or vaginal bleeding.    Patient reports she is on Tegretol XL (Carbamazepine 12 hours) 600 mg twice daily for her seizures.  She also takes InnoPran XL (Propranolol) for prophylaxis against migraines.  She takes a prenatal vitamin and folic acid.  She reports no known drug allergies.    Patient reports 2 prior Caesarean section but otherwise no other prior surgery.  Patient denies any vaginal spotting or bleeding.  Patient reports her last menstrual period was on 3/12/2023 which gives an estimate 12 weeks pregnancy.  She has established care with Dr. Aleyda Méndez.    Patient reports about a 20 pack-year tobacco smoking history.  She smoked about a pack day until she quit December 2021.  Denies any alcohol or substance abuse.  She works as a civilian contractor for the .      Procedure(s) (LRB):  APPENDECTOMY, LAPAROSCOPIC (N/A)      Hospital Course:   Patient is a 42-year-old woman with history of seizure disorder, migraines, with early pregnancy (12 weeks) who admitted the hospital for treatment  of acute appendicitis.  Patient treated intravenous antibiotics.  Patient continue on her home antiseizure regimen.  Patient underwent laparoscopic appendectomy performed by Dr. Brandee Arriaza on 6/4/2023. Patient tolerated surgery well without complication.  Antibiotic therapy discontinued.  Diet successfully advanced and postoperative pain adequate control with oral pain medication.  Obstetrics service consult for management of pregnancy.  Patient normal fetal tones before and after surgery.  Outpatient follow-up with her obstetrician Dr. Aleyda Méndez recommended.       Goals of Care Treatment Preferences:  Code Status: Full Code      Consults:   Consults (From admission, onward)        Status Ordering Provider     Inpatient consult to Obstetrics  Once        Provider:  (Not yet assigned)    Completed JOSIAH LAWLER     Inpatient consult to General Surgery  Once        Provider:  (Not yet assigned)    Completed SD CHOWDHURY          Final Active Diagnoses:    Diagnosis Date Noted POA    PRINCIPAL PROBLEM:  Acute appendicitis [K35.80] 06/04/2023 Yes    12 weeks gestation of pregnancy [Z3A.12] 06/04/2023 Not Applicable    Epilepsy [G40.909] 12/22/2021 Yes    Migraine without status migrainosus, not intractable [G43.909] 12/22/2021 Yes      Problems Resolved During this Admission:       Discharged Condition: Stable    Disposition: Home or Self Care    Follow Up:   Follow-up Information     Aleyda Méndez MD. Schedule an appointment as soon as possible for a visit in 1 week(s).    Specialty: Obstetrics and Gynecology  Why: Management of pregnancy  Contact information:  [facility] [facility], [address]  [facility] [address]  [phone]             Brandee Arriaza MD. Schedule an appointment as soon as possible for a visit in 2 week(s).    Specialty: Surgery  Why: Post-op check  Contact information:  [address] [facility] [address]  [phone]                       Patient Instructions:       Diet Adult Regular     Notify your health care provider if you experience any of the following:  temperature >100.4     Notify your health care provider if you experience any of the following:  persistent nausea and vomiting or diarrhea     Notify your health care provider if you experience any of the following:  severe uncontrolled pain     Notify your health care provider if you experience any of the following:  difficulty breathing or increased cough     Notify your health care provider if you experience any of the following:  redness, tenderness, or signs of infection (pain, swelling, redness, odor or green/yellow discharge around incision site)     Notify your health care provider if you experience any of the following:  severe persistent headache     Notify your health care provider if you experience any of the following:  worsening rash     Notify your health care provider if you experience any of the following:  persistent dizziness, light-headedness, or visual disturbances     Notify your health care provider if you experience any of the following:  increased confusion or weakness     Activity as tolerated         Pending Diagnostic Studies:     Procedure Component Value Units Date/Time    Specimen to Pathology, Surgery General Surgery [[id number]] Collected: 06/04/23 1335    Order Status: Sent Lab Status: In process Updated: 06/05/23 1109    Specimen: Tissue          Medications:  Reconciled Home Medications:      Medication List      START taking these medications    oxyCODONE-acetaminophen 7.5-325 mg per tablet  Commonly known as: PERCOCET  Take 1 tablet by mouth every 8 (eight) hours as needed for Pain.        CONTINUE taking these medications    folic acid 1 MG tablet  Commonly known as: FOLVITE  Take 4 tablets (4 mg total) by mouth once daily.     INNOPRAN  mg Cp24  Generic drug: propranoloL  TK 1 C PO QAM     TEGretol  MG 12 hr tablet  Generic drug: carBAMazepine  TK 3 TS PO BID         STOP taking these medications    ezetimibe 10 mg tablet  Commonly known as: ZETIA            Indwelling Lines/Drains at time of discharge:   Lines/Drains/Airways     None                 Time spent on the discharge of patient: 35 minutes         Ever Mcgee MD  Department of Hospital Medicine  [facility] Surg ([address])

## 2023-06-05 NOTE — TELEPHONE ENCOUNTER
Called to check in. POD#1 from lap appendectomy for acute appendicitis. 12 weeks gestation today. Reports she is doing well, getting discharged later today.  Also provided her with [id number] result. Negative genetics. Would like gender in envelope next visit.     Aleyda Méndez MD  Obstetrics and Gynecology  [facility]

## 2023-06-05 NOTE — PLAN OF CARE
Patient appts were placed on her AVS.  Pateint family will help transport at discharge.   06/05/23 1317   Final Note   Assessment Type Final Discharge Note   Anticipated Discharge Disposition Home   Hospital Resources/Appts/Education Provided Appointments scheduled and added to AVS;Provided patient/caregiver with written discharge plan information;Post-Acute resouces added to AVS   Post-Acute Status   Discharge Delays None known at this time

## 2023-06-06 ENCOUNTER — TELEPHONE (OUTPATIENT)
Dept: OBSTETRICS AND GYNECOLOGY | Facility: CLINIC | Age: 43
End: 2023-06-06
Payer: COMMERCIAL

## 2023-06-07 ENCOUNTER — PATIENT MESSAGE (OUTPATIENT)
Dept: SURGERY | Facility: CLINIC | Age: 43
End: 2023-06-07
Payer: COMMERCIAL

## 2023-06-07 NOTE — TELEPHONE ENCOUNTER
Called pt to discuss appts.    Pt said she had appendicitis past weekend and appendix removed.    Stated she was told to f/u with obgyn in a week.    Pt stated her original appt was 6/14 with Dr. Méndez.    Pt stated someone called her to r/s due to Dev being on night call.    Pt wanted to know if she can come a week later.    Advised pt that she should follow up in a week as instructed.     Informed pt that she was supposed to follow up for initial ob appt 4 wks from 5/16.    Pt should have appt the week of 6/12-6/16.    Pt agreed.     R/s pt and scheduled routine office visits.    Informed pt that she should check patient portal for updates.    Pt voiced understanding.    AJ

## 2023-06-07 NOTE — PROGRESS NOTES
[facility] Surg ([address])  General Surgery  Progress Note    Subjective:     History of Present Illness:  42 y.o. 12 week pregnant woman who presents with one day of acute onset nausea associated with right sided abdominal pain.  She reports anorexia and worsening pain.  She presented to the ED and was noted to have leukocytosis with a left shift and MRI confirmed acute appendicitis without radiographic signs of rupture.  She has had prior c-sections, no other abdominal surgery.        Post-Op Info:  Procedure(s) (LRB):  APPENDECTOMY, LAPAROSCOPIC (N/A)   3 Days Post-Op     Interval History: Has not had breakfast yet, having some incisional pain and discomfort, pain improved from preop.  Denies fevers, chills, nausea, vomiting.  Has voided.    Review of patient's allergies indicates:  No Known Allergies  Objective:     Vital Signs (Most Recent):  Temp: 98.4 °F (36.9 °C) (06/05/23 1110)  Pulse: 84 (06/05/23 1110)  Resp: 18 (06/05/23 1110)  BP: 120/74 (06/05/23 1110)  SpO2: (!) 94 % (06/05/23 1110) Vital Signs (24h Range):        Weight: 72.6 kg (160 lb)  Body mass index is 31.25 kg/m².       Physical Exam   General: 42 y.o. female in no acute distress   Neuro: grossly intact    HEENT: pupils grossly reactive to light  Respiratory: respirations are even and unlabored  Cardiac: regular rate and rhythm  Abdomen: soft, appropriately tender, incisions dry and intact  Skin: warm, dry  Extremities: no edema    Significant Labs:  I have reviewed all pertinent lab results within the past 24 hours.    Significant Diagnostics:  I have reviewed all pertinent imaging results/findings within the past 24 hours.    Assessment/Plan:     * Acute appendicitis  Acute gangrenous appendicitis with focal peritonitis without perforation s/p laparoscopic appendectomy (6/4/2023 )    - Diet as  Tolerated  - Pain control and antiemetics as tolerated  - Appropriate for discharge after diet tolerated and after pain better  controlled    12 weeks gestation of pregnancy  - Fetal heart tones completed    Epilepsy  - Continue current home medications        Brandee Arriaza MD  General Surgery  [facility] ([address])

## 2023-06-07 NOTE — SUBJECTIVE & OBJECTIVE
Interval History: Has not had breakfast yet, having some incisional pain and discomfort, pain improved from preop.  Denies fevers, chills, nausea, vomiting.  Has voided.    Review of patient's allergies indicates:  No Known Allergies  Objective:     Vital Signs (Most Recent):  Temp: 98.4 °F (36.9 °C) (06/05/23 1110)  Pulse: 84 (06/05/23 1110)  Resp: 18 (06/05/23 1110)  BP: 120/74 (06/05/23 1110)  SpO2: (!) 94 % (06/05/23 1110) Vital Signs (24h Range):        Weight: 72.6 kg (160 lb)  Body mass index is 31.25 kg/m².       Physical Exam   General: 42 y.o. female in no acute distress   Neuro: grossly intact    HEENT: pupils grossly reactive to light  Respiratory: respirations are even and unlabored  Cardiac: regular rate and rhythm  Abdomen: soft, appropriately tender, incisions dry and intact  Skin: warm, dry  Extremities: no edema    Significant Labs:  I have reviewed all pertinent lab results within the past 24 hours.    Significant Diagnostics:  I have reviewed all pertinent imaging results/findings within the past 24 hours.

## 2023-06-07 NOTE — ASSESSMENT & PLAN NOTE
Acute gangrenous appendicitis with focal peritonitis without perforation s/p laparoscopic appendectomy (6/4/2023 )    - Diet as  Tolerated  - Pain control and antiemetics as tolerated  - Appropriate for discharge after diet tolerated and after pain better controlled

## 2023-06-09 LAB
FINAL PATHOLOGIC DIAGNOSIS: NORMAL
Lab: NORMAL

## 2023-06-15 ENCOUNTER — INITIAL PRENATAL (OUTPATIENT)
Dept: OBSTETRICS AND GYNECOLOGY | Facility: CLINIC | Age: 43
End: 2023-06-15
Payer: COMMERCIAL

## 2023-06-15 VITALS
BODY MASS INDEX: 31.43 KG/M2 | SYSTOLIC BLOOD PRESSURE: 112 MMHG | WEIGHT: 160.94 LBS | DIASTOLIC BLOOD PRESSURE: 62 MMHG

## 2023-06-15 DIAGNOSIS — Z3A.13 13 WEEKS GESTATION OF PREGNANCY: Primary | ICD-10-CM

## 2023-06-15 PROCEDURE — 99999 PR PBB SHADOW E&M-EST. PATIENT-LVL III: ICD-10-PCS | Mod: PBBFAC,,, | Performed by: STUDENT IN AN ORGANIZED HEALTH CARE EDUCATION/TRAINING PROGRAM

## 2023-06-15 PROCEDURE — 0502F SUBSEQUENT PRENATAL CARE: CPT | Mod: CPTII,S$GLB,, | Performed by: STUDENT IN AN ORGANIZED HEALTH CARE EDUCATION/TRAINING PROGRAM

## 2023-06-15 PROCEDURE — 0502F PR SUBSEQUENT PRENATAL CARE: ICD-10-PCS | Mod: CPTII,S$GLB,, | Performed by: STUDENT IN AN ORGANIZED HEALTH CARE EDUCATION/TRAINING PROGRAM

## 2023-06-15 PROCEDURE — 99999 PR PBB SHADOW E&M-EST. PATIENT-LVL III: CPT | Mod: PBBFAC,,, | Performed by: STUDENT IN AN ORGANIZED HEALTH CARE EDUCATION/TRAINING PROGRAM

## 2023-06-15 RX ORDER — BUTALBITAL, ACETAMINOPHEN AND CAFFEINE 300; 40; 50 MG/1; MG/1; MG/1
1 CAPSULE ORAL DAILY PRN
COMMUNITY
Start: 2023-05-22

## 2023-06-15 RX ORDER — CARBAMAZEPINE 200 MG/1
600 TABLET, EXTENDED RELEASE ORAL
COMMUNITY
Start: 2023-06-14 | End: 2023-06-15

## 2023-06-15 RX ORDER — PROPRANOLOL HYDROCHLORIDE 120 MG/1
120 CAPSULE, EXTENDED RELEASE ORAL EVERY MORNING
COMMUNITY
Start: 2023-05-22 | End: 2023-06-15

## 2023-06-15 NOTE — PROGRESS NOTES
at 13w3d   Doing well overall, -LOF/VB/CTX. Back at work. No n/v, able to tolerate PO. No abnormal vaginal discharge/hematuria.   ConnectedMOM orders. Rec start aspirin. Anatomy US order in. Has MFM apt.  s/p appy, abdominal incisions well healing, no evidence of infection, no surrounding skin changes, abdomen soft, dermabond applied.   Vscan with +FM, +CM.   Envelope provided with gender!  Pt of Dr. Méndez. RTC 3wks or sooner PRN. Presents with SO.

## 2023-06-16 ENCOUNTER — PATIENT MESSAGE (OUTPATIENT)
Dept: ADMINISTRATIVE | Facility: OTHER | Age: 43
End: 2023-06-16
Payer: COMMERCIAL

## 2023-06-19 ENCOUNTER — OFFICE VISIT (OUTPATIENT)
Dept: SURGERY | Facility: CLINIC | Age: 43
End: 2023-06-19
Payer: COMMERCIAL

## 2023-06-19 VITALS
SYSTOLIC BLOOD PRESSURE: 100 MMHG | BODY MASS INDEX: 31.96 KG/M2 | DIASTOLIC BLOOD PRESSURE: 59 MMHG | HEIGHT: 60 IN | WEIGHT: 162.81 LBS

## 2023-06-19 DIAGNOSIS — K35.31 ACUTE APPENDICITIS WITH LOCALIZED PERITONITIS AND GANGRENE, WITHOUT PERFORATION OR ABSCESS: Primary | ICD-10-CM

## 2023-06-19 PROCEDURE — 99999 PR PBB SHADOW E&M-EST. PATIENT-LVL III: CPT | Mod: PBBFAC,,, | Performed by: STUDENT IN AN ORGANIZED HEALTH CARE EDUCATION/TRAINING PROGRAM

## 2023-06-19 PROCEDURE — 99024 PR POST-OP FOLLOW-UP VISIT: ICD-10-PCS | Mod: S$GLB,,, | Performed by: STUDENT IN AN ORGANIZED HEALTH CARE EDUCATION/TRAINING PROGRAM

## 2023-06-19 PROCEDURE — 3008F PR BODY MASS INDEX (BMI) DOCUMENTED: ICD-10-PCS | Mod: CPTII,S$GLB,, | Performed by: STUDENT IN AN ORGANIZED HEALTH CARE EDUCATION/TRAINING PROGRAM

## 2023-06-19 PROCEDURE — 99024 POSTOP FOLLOW-UP VISIT: CPT | Mod: S$GLB,,, | Performed by: STUDENT IN AN ORGANIZED HEALTH CARE EDUCATION/TRAINING PROGRAM

## 2023-06-19 PROCEDURE — 3008F BODY MASS INDEX DOCD: CPT | Mod: CPTII,S$GLB,, | Performed by: STUDENT IN AN ORGANIZED HEALTH CARE EDUCATION/TRAINING PROGRAM

## 2023-06-19 PROCEDURE — 3078F DIAST BP <80 MM HG: CPT | Mod: CPTII,S$GLB,, | Performed by: STUDENT IN AN ORGANIZED HEALTH CARE EDUCATION/TRAINING PROGRAM

## 2023-06-19 PROCEDURE — 3078F PR MOST RECENT DIASTOLIC BLOOD PRESSURE < 80 MM HG: ICD-10-PCS | Mod: CPTII,S$GLB,, | Performed by: STUDENT IN AN ORGANIZED HEALTH CARE EDUCATION/TRAINING PROGRAM

## 2023-06-19 PROCEDURE — 99999 PR PBB SHADOW E&M-EST. PATIENT-LVL III: ICD-10-PCS | Mod: PBBFAC,,, | Performed by: STUDENT IN AN ORGANIZED HEALTH CARE EDUCATION/TRAINING PROGRAM

## 2023-06-19 PROCEDURE — 3044F HG A1C LEVEL LT 7.0%: CPT | Mod: CPTII,S$GLB,, | Performed by: STUDENT IN AN ORGANIZED HEALTH CARE EDUCATION/TRAINING PROGRAM

## 2023-06-19 PROCEDURE — 3044F PR MOST RECENT HEMOGLOBIN A1C LEVEL <7.0%: ICD-10-PCS | Mod: CPTII,S$GLB,, | Performed by: STUDENT IN AN ORGANIZED HEALTH CARE EDUCATION/TRAINING PROGRAM

## 2023-06-19 PROCEDURE — 3074F PR MOST RECENT SYSTOLIC BLOOD PRESSURE < 130 MM HG: ICD-10-PCS | Mod: CPTII,S$GLB,, | Performed by: STUDENT IN AN ORGANIZED HEALTH CARE EDUCATION/TRAINING PROGRAM

## 2023-06-19 PROCEDURE — 3074F SYST BP LT 130 MM HG: CPT | Mod: CPTII,S$GLB,, | Performed by: STUDENT IN AN ORGANIZED HEALTH CARE EDUCATION/TRAINING PROGRAM

## 2023-06-19 NOTE — PROGRESS NOTES
[facility] - [facility]  General Surgery  Clinic Note      Subjective:     Chief Complaint: Post-operative follow up    History of Present Illness:  Patient is a 42 y.o. female with Hx of acute appendicitis at 12 weeks pregnancy s/p laparoscopic appendectomy (23) who presents to clinic today for post-operative follow up. Patient doing well post-operatively. Denies needing pain medications. Incisions healing well, tolerating regular diet, having bowel function and back to her regular activities. No acute concerns for her visit today.    Current Outpatient Medications on File Prior to Visit   Medication Sig    butalbital-acetaminophen-caff -40 mg Cap Take 1 capsule by mouth daily as needed.    folic acid (FOLVITE) 1 MG tablet Take 4 tablets (4 mg total) by mouth once daily.    INNOPRAN  mg Cp24 TK 1 C PO QAM    TEGRETOL  mg 12 hr tablet TK 3 TS PO BID     No current facility-administered medications on file prior to visit.       Review of patient's allergies indicates:  No Known Allergies    Past Medical History:   Diagnosis Date    Epilepsy     Migraines      Past Surgical History:   Procedure Laterality Date     SECTION      c/s x 2    LAPAROSCOPIC APPENDECTOMY N/A 2023    Procedure: APPENDECTOMY, LAPAROSCOPIC;  Surgeon: Brandee Arriaza MD;  Location: [facility];  Service: General;  Laterality: N/A;     Family History       Problem Relation (Age of Onset)    Breast cancer Mother    Hypertension Father    Ovarian cancer Mother          Tobacco Use    Smoking status: Former     Types: Cigarettes     Quit date: 2021     Years since quittin.5    Smokeless tobacco: Never   Substance and Sexual Activity    Alcohol use: Not Currently    Drug use: Never    Sexual activity: Yes     Partners: Male     Birth control/protection: None         Objective:     Vital Signs (Most Recent):  BP: (!) 100/59 (23 1505)     Weight: 73.9 kg (162 lb 13 oz)  Body mass index is 31.8  kg/m².    Physical exam:  General: no acute distress  Neuro: AAOx4  Cardio: S1 and S2, RRR  Resp: Moving air appropriately, breathing even and unlabored  Abd: Soft, non-tender, non-distended, incisions clean/dry/intact  Ext: Warm and well perfused      Pathology:      Assessment/Plan:   42 y.o. female s/p laparoscopic appendectomy approximately 2 weeks ago, doing well.    - no heavy lifting, 10-15lbs for 4-6 weeks after surgery  - Return to clinic as needed    Brandee Arriaza MD  Staff Surgeon  General & Endocrine Surgery  6/19/23

## 2023-06-22 ENCOUNTER — ROUTINE PRENATAL (OUTPATIENT)
Dept: OBSTETRICS AND GYNECOLOGY | Facility: CLINIC | Age: 43
End: 2023-06-22
Attending: STUDENT IN AN ORGANIZED HEALTH CARE EDUCATION/TRAINING PROGRAM
Payer: COMMERCIAL

## 2023-06-22 ENCOUNTER — PATIENT MESSAGE (OUTPATIENT)
Dept: OBSTETRICS AND GYNECOLOGY | Facility: CLINIC | Age: 43
End: 2023-06-22
Payer: COMMERCIAL

## 2023-06-22 ENCOUNTER — TELEPHONE (OUTPATIENT)
Dept: OBSTETRICS AND GYNECOLOGY | Facility: CLINIC | Age: 43
End: 2023-06-22

## 2023-06-22 VITALS
SYSTOLIC BLOOD PRESSURE: 100 MMHG | DIASTOLIC BLOOD PRESSURE: 70 MMHG | BODY MASS INDEX: 31.45 KG/M2 | WEIGHT: 161.06 LBS

## 2023-06-22 DIAGNOSIS — N93.9 VAGINAL SPOTTING: ICD-10-CM

## 2023-06-22 DIAGNOSIS — Z34.81 ENCOUNTER FOR SUPERVISION OF NORMAL PREGNANCY IN MULTIGRAVIDA IN FIRST TRIMESTER: Primary | ICD-10-CM

## 2023-06-22 DIAGNOSIS — Z3A.14 14 WEEKS GESTATION OF PREGNANCY: ICD-10-CM

## 2023-06-22 PROCEDURE — 0502F SUBSEQUENT PRENATAL CARE: CPT | Mod: CPTII,S$GLB,, | Performed by: STUDENT IN AN ORGANIZED HEALTH CARE EDUCATION/TRAINING PROGRAM

## 2023-06-22 PROCEDURE — 0502F PR SUBSEQUENT PRENATAL CARE: ICD-10-PCS | Mod: CPTII,S$GLB,, | Performed by: STUDENT IN AN ORGANIZED HEALTH CARE EDUCATION/TRAINING PROGRAM

## 2023-06-22 PROCEDURE — 99999 PR PBB SHADOW E&M-EST. PATIENT-LVL II: CPT | Mod: PBBFAC,,, | Performed by: STUDENT IN AN ORGANIZED HEALTH CARE EDUCATION/TRAINING PROGRAM

## 2023-06-22 PROCEDURE — 99999 PR PBB SHADOW E&M-EST. PATIENT-LVL II: ICD-10-PCS | Mod: PBBFAC,,, | Performed by: STUDENT IN AN ORGANIZED HEALTH CARE EDUCATION/TRAINING PROGRAM

## 2023-06-22 NOTE — TELEPHONE ENCOUNTER
6/22/23 @ 5936 Returned pt's call. Woke up this am she had some mild spotting, brown discharge each time she wipes.Denies intercourse.  Pt scheduled to see Dr Méndez today at 3:45 at the [facility].  Pt verbalizes understanding.

## 2023-06-22 NOTE — PROGRESS NOTES
14w3d  Doing well overall. C/o red spotting on toilet paper when she woke up this morning followed by brown discharge since that time. No cramping. Wanted to check in and make sure everything is fine.  Denies abdominal pain, leakage of fluid, dysuria, headaches.  Pelvic exam: Normal external genitalia, scant amt of old brown blood in the vault, no bright red blood or any active bleeding, cervix smooth and non-friable with no lesions. Cervix closed.  Reassured by +FHTs and normal pelvic exam. Bleeding and infection precautions were provided. RTC when scheduled for RPV.

## 2023-06-23 PROBLEM — K35.80 ACUTE APPENDICITIS: Status: RESOLVED | Noted: 2023-06-04 | Resolved: 2023-06-23

## 2023-07-03 ENCOUNTER — PATIENT MESSAGE (OUTPATIENT)
Dept: OTHER | Facility: OTHER | Age: 43
End: 2023-07-03
Payer: COMMERCIAL

## 2023-07-10 ENCOUNTER — PATIENT MESSAGE (OUTPATIENT)
Dept: OTHER | Facility: OTHER | Age: 43
End: 2023-07-10
Payer: COMMERCIAL

## 2023-07-12 ENCOUNTER — ROUTINE PRENATAL (OUTPATIENT)
Dept: OBSTETRICS AND GYNECOLOGY | Facility: CLINIC | Age: 43
End: 2023-07-12
Payer: COMMERCIAL

## 2023-07-12 VITALS
BODY MASS INDEX: 31.65 KG/M2 | WEIGHT: 162.06 LBS | SYSTOLIC BLOOD PRESSURE: 104 MMHG | DIASTOLIC BLOOD PRESSURE: 61 MMHG

## 2023-07-12 DIAGNOSIS — G40.909 NONINTRACTABLE EPILEPSY WITHOUT STATUS EPILEPTICUS, UNSPECIFIED EPILEPSY TYPE: ICD-10-CM

## 2023-07-12 DIAGNOSIS — O34.42 HISTORY OF LOOP ELECTROSURGICAL EXCISION PROCEDURE (LEEP) OF CERVIX AFFECTING PREGNANCY IN SECOND TRIMESTER: ICD-10-CM

## 2023-07-12 DIAGNOSIS — O09.522 AMA (ADVANCED MATERNAL AGE) MULTIGRAVIDA 35+, SECOND TRIMESTER: ICD-10-CM

## 2023-07-12 DIAGNOSIS — Z98.890 HISTORY OF LOOP ELECTROSURGICAL EXCISION PROCEDURE (LEEP) OF CERVIX AFFECTING PREGNANCY IN SECOND TRIMESTER: ICD-10-CM

## 2023-07-12 DIAGNOSIS — Z34.82 ENCOUNTER FOR SUPERVISION OF NORMAL PREGNANCY IN MULTIGRAVIDA IN SECOND TRIMESTER: Primary | ICD-10-CM

## 2023-07-12 DIAGNOSIS — Z3A.17 17 WEEKS GESTATION OF PREGNANCY: ICD-10-CM

## 2023-07-12 PROCEDURE — 99999 PR PBB SHADOW E&M-EST. PATIENT-LVL III: ICD-10-PCS | Mod: PBBFAC,,, | Performed by: STUDENT IN AN ORGANIZED HEALTH CARE EDUCATION/TRAINING PROGRAM

## 2023-07-12 PROCEDURE — 99999 PR PBB SHADOW E&M-EST. PATIENT-LVL III: CPT | Mod: PBBFAC,,, | Performed by: STUDENT IN AN ORGANIZED HEALTH CARE EDUCATION/TRAINING PROGRAM

## 2023-07-12 PROCEDURE — 0502F PR SUBSEQUENT PRENATAL CARE: ICD-10-PCS | Mod: CPTII,S$GLB,, | Performed by: STUDENT IN AN ORGANIZED HEALTH CARE EDUCATION/TRAINING PROGRAM

## 2023-07-12 PROCEDURE — 0502F SUBSEQUENT PRENATAL CARE: CPT | Mod: CPTII,S$GLB,, | Performed by: STUDENT IN AN ORGANIZED HEALTH CARE EDUCATION/TRAINING PROGRAM

## 2023-07-12 RX ORDER — MUPIROCIN 20 MG/G
OINTMENT TOPICAL 2 TIMES DAILY
Qty: 30 G | Refills: 1 | Status: SHIPPED | OUTPATIENT
Start: 2023-07-12 | End: 2023-07-19

## 2023-07-12 RX ORDER — PROPRANOLOL HYDROCHLORIDE 120 MG/1
120 CAPSULE, EXTENDED RELEASE ORAL EVERY MORNING
COMMUNITY
Start: 2023-06-23 | End: 2023-07-12 | Stop reason: SDUPTHER

## 2023-07-12 NOTE — PROGRESS NOTES
17w2d  Doing well today without any complaints. Has noticed redness and scant incisional drainage from midline port site incision from lap appendectomy last month. No fever/chills. On exam, mildly erythematous with scant yellow drainage on bandage, no fluctuance.  Denies abdominal pain, vaginal bleeding, discharge, leakage of fluid, dysuria, headaches.  Baseline cervical length this week, [facility] [place] visit for h/o LEEP and epilepsy in 2 wks. Anatomy scan scheduled. NICOLAS precautions were provided. Continue ASA daily. RTC 4 wks.

## 2023-07-25 ENCOUNTER — PATIENT MESSAGE (OUTPATIENT)
Dept: MATERNAL FETAL MEDICINE | Facility: CLINIC | Age: 43
End: 2023-07-25
Payer: COMMERCIAL

## 2023-07-26 ENCOUNTER — OFFICE VISIT (OUTPATIENT)
Dept: MATERNAL FETAL MEDICINE | Facility: CLINIC | Age: 43
End: 2023-07-26
Payer: COMMERCIAL

## 2023-07-26 ENCOUNTER — PROCEDURE VISIT (OUTPATIENT)
Dept: MATERNAL FETAL MEDICINE | Facility: CLINIC | Age: 43
End: 2023-07-26
Payer: COMMERCIAL

## 2023-07-26 VITALS
SYSTOLIC BLOOD PRESSURE: 120 MMHG | BODY MASS INDEX: 32.59 KG/M2 | DIASTOLIC BLOOD PRESSURE: 72 MMHG | HEIGHT: 60 IN | WEIGHT: 166 LBS

## 2023-07-26 DIAGNOSIS — Z34.90 PREGNANCY, UNSPECIFIED GESTATIONAL AGE: ICD-10-CM

## 2023-07-26 DIAGNOSIS — G40.909 NONINTRACTABLE EPILEPSY WITHOUT STATUS EPILEPTICUS, UNSPECIFIED EPILEPSY TYPE: Primary | ICD-10-CM

## 2023-07-26 DIAGNOSIS — O34.42 HISTORY OF LOOP ELECTROSURGICAL EXCISION PROCEDURE (LEEP) OF CERVIX AFFECTING PREGNANCY IN SECOND TRIMESTER: ICD-10-CM

## 2023-07-26 DIAGNOSIS — G40.909 NONINTRACTABLE EPILEPSY WITHOUT STATUS EPILEPTICUS, UNSPECIFIED EPILEPSY TYPE: ICD-10-CM

## 2023-07-26 DIAGNOSIS — Z98.890 HISTORY OF LOOP ELECTROSURGICAL EXCISION PROCEDURE (LEEP) OF CERVIX AFFECTING PREGNANCY IN SECOND TRIMESTER: ICD-10-CM

## 2023-07-26 DIAGNOSIS — O09.522 AMA (ADVANCED MATERNAL AGE) MULTIGRAVIDA 35+, SECOND TRIMESTER: ICD-10-CM

## 2023-07-26 PROCEDURE — 99214 OFFICE O/P EST MOD 30 MIN: CPT | Mod: 25,S$GLB,, | Performed by: OBSTETRICS & GYNECOLOGY

## 2023-07-26 PROCEDURE — 76811 OB US DETAILED SNGL FETUS: CPT | Mod: S$GLB,,, | Performed by: OBSTETRICS & GYNECOLOGY

## 2023-07-26 PROCEDURE — 3044F HG A1C LEVEL LT 7.0%: CPT | Mod: CPTII,S$GLB,, | Performed by: OBSTETRICS & GYNECOLOGY

## 2023-07-26 PROCEDURE — 3078F PR MOST RECENT DIASTOLIC BLOOD PRESSURE < 80 MM HG: ICD-10-PCS | Mod: CPTII,S$GLB,, | Performed by: OBSTETRICS & GYNECOLOGY

## 2023-07-26 PROCEDURE — 3008F PR BODY MASS INDEX (BMI) DOCUMENTED: ICD-10-PCS | Mod: CPTII,S$GLB,, | Performed by: OBSTETRICS & GYNECOLOGY

## 2023-07-26 PROCEDURE — 99214 PR OFFICE/OUTPT VISIT, EST, LEVL IV, 30-39 MIN: ICD-10-PCS | Mod: 25,S$GLB,, | Performed by: OBSTETRICS & GYNECOLOGY

## 2023-07-26 PROCEDURE — 99999 PR PBB SHADOW E&M-EST. PATIENT-LVL III: ICD-10-PCS | Mod: PBBFAC,,, | Performed by: OBSTETRICS & GYNECOLOGY

## 2023-07-26 PROCEDURE — 3074F SYST BP LT 130 MM HG: CPT | Mod: CPTII,S$GLB,, | Performed by: OBSTETRICS & GYNECOLOGY

## 2023-07-26 PROCEDURE — 3008F BODY MASS INDEX DOCD: CPT | Mod: CPTII,S$GLB,, | Performed by: OBSTETRICS & GYNECOLOGY

## 2023-07-26 PROCEDURE — 3078F DIAST BP <80 MM HG: CPT | Mod: CPTII,S$GLB,, | Performed by: OBSTETRICS & GYNECOLOGY

## 2023-07-26 PROCEDURE — 3044F PR MOST RECENT HEMOGLOBIN A1C LEVEL <7.0%: ICD-10-PCS | Mod: CPTII,S$GLB,, | Performed by: OBSTETRICS & GYNECOLOGY

## 2023-07-26 PROCEDURE — 99999 PR PBB SHADOW E&M-EST. PATIENT-LVL III: CPT | Mod: PBBFAC,,, | Performed by: OBSTETRICS & GYNECOLOGY

## 2023-07-26 PROCEDURE — 3074F PR MOST RECENT SYSTOLIC BLOOD PRESSURE < 130 MM HG: ICD-10-PCS | Mod: CPTII,S$GLB,, | Performed by: OBSTETRICS & GYNECOLOGY

## 2023-07-26 PROCEDURE — 76811 US MFM PROCEDURE (VIEWPOINT): ICD-10-PCS | Mod: S$GLB,,, | Performed by: OBSTETRICS & GYNECOLOGY

## 2023-07-26 NOTE — PROGRESS NOTES
Maternal Fetal Medicine consult    SUBJECTIVE:     Marilu Frausto is a 42 y.o.  female with IUP at 19w2d who is seen in consultation by MFM for evaluation and management of:  Problem   Loma (Advanced Maternal Age) Multigravida 35+, Second Trimester   History of Loop Electrosurgical Excision Procedure (Leep) of Cervix Affecting Pregnancy in Second Trimester   Epilepsy       OB HX:  OB History    Para Term  AB Living   3 2 2     2   SAB IAB Ectopic Multiple Live Births           2      # Outcome Date GA Lbr Carlos/2nd Weight Sex Delivery Anes PTL Lv   3 Current            2 Term 2013 39w0d  4.082 kg (9 lb) M CS-LTranv  N YASSINE      Birth Comments: 9#   1 Term 2009 37w0d  2.92 kg (6 lb 7 oz) M CS-LTranv  N YASSINE      Birth Comments: Patient was induced and broke patient's ribs      Complications: Failure to Progress in Second Stage         Past Medical History:   Diagnosis Date    Acute appendicitis 2023    Epilepsy     Migraines      Past Surgical History:   Procedure Laterality Date    CERVICAL BIOPSY  W/ LOOP ELECTRODE EXCISION       SECTION      c/s x 2    LAPAROSCOPIC APPENDECTOMY N/A 2023    Procedure: APPENDECTOMY, LAPAROSCOPIC;  Surgeon: Brandee Arriaza MD;  Location: [facility];  Service: General;  Laterality: N/A;     Family history: negative for birth defects, recurrent miscarriages, chromosomal abnormalities.   Social History     Tobacco Use    Smoking status: Former     Types: Cigarettes     Quit date: 2021     Years since quittin.6    Smokeless tobacco: Never   Substance Use Topics    Alcohol use: Not Currently    Drug use: Never     Review of patient's allergies indicates:  No Known Allergies  Medications:  Current Outpatient Medications on File Prior to Visit   Medication Sig Dispense Refill    butalbital-acetaminophen-caff -40 mg Cap Take 1 capsule by mouth daily as needed.      folic acid (FOLVITE) 1 MG tablet Take 4 tablets (4 mg total) by mouth  once daily. 120 tablet 2    INNOPRAN  mg Cp24 TK 1 C PO QAM  6    TEGRETOL  mg 12 hr tablet TK 3 TS PO BID  6     No current facility-administered medications on file prior to visit.       A[facility]y screening:   [facility]    Records reviewed    OBJECTIVE:     Blood Pressure: 120/72    Ultrasound performed. See viewpoint for full ultrasound report.  A detailed fetal anatomic ultrasound examination was performed for the following high risk indication: AMA, epilepsy.   No fetal structural malformations are identified; however, fetal imaging is incomplete today.   A follow-up study will be scheduled to complete the fetal anatomic survey.   Fetal size today is consistent with established gestational age.   Cervical length by TA scanning is normal.   Placental location is anterior without evidence of previa.     ASSESSMENT/PLAN:     42 y.o.  female with IUP at 19w2d    Epilepsy  Seizure disorder diagnosed many years ago  Last seizure was at the age of 22, reports she has seizures if she misses her medication. Has been well controlled on Tegretol for years.  Sees neuro at . Has had two uncomplicated pregnancies on Tegretol.   The patient was counseled regarding the usual course of epilepsy during pregnancy and potential maternal and fetal risks (fetal growth restriction and congenital anomalies, list not exclusive) of epilepsy in pregnancy. The majority of patients with epilepsy have a normal pregnancy. Uncontrolled epilepsy confers increased maternal and fetal risks, therefore, the patient was counseled to continue medications as directed by neurologist. She was advised the medication doses may need to be adjusted during pregnancy and in the postpartum period and she should have antiepileptic drug levels measured under the care of her neurologist. The patient was counseled to notify her OB or neurologist should she be unable to tolerate due to nausea and vomiting of pregnancy. Certain types of  epilepsy and those using multiple anti-epileptics are increased risk for more seizures during pregnancy. The patient was counseled to avoid triggers (sleep deprivation) and to ensure additional supervision with feeding, changing, and bathing baby after birth.   The AED should be administered at the lowest effective dose/plasma level. We do not routinely recommend discontinuing or changing AED therapy once pregnancy has been established. Medication adjustment is best accomplished pre-pregnancy.    Recommendations:  Continue care with neurology - has follow up scheduled  Continue current medications: Tegretol 600 BID  Folic acid 4 mg daily (ideally started 1-3 months pre-pregnancy)  Antiepileptic drug levels should be monitored per primary neurologist  Detailed anatomic ultrasound at 18-20 weeks - incomplete today  Serial growth ultrasounds q 4-6 weeks, starting at 26-28 weeks  Monitor AED levels postpartum and return to pre-pregnancy dose as indicated  Ensure adequate support at home after delivery  Notify pediatrics if patient desires to breastfeed  Ensure AED is compatible with planned contraception  Driving restrictions per neurology    AMA (advanced maternal age) multigravida 35+, second trimester  Today I counseled the patient on the relationship between maternal age and fetal aneuploidy.  We discussed the risks and benefits of screening tests versus definitive genetic testing (amniocentesis). She was counseled about her specific age-related risk of fetal aneuploidy. We discussed the limitations of ultrasound in the definitive diagnosis of fetal aneuploidy.  Additionally, we discussed the association between advanced maternal age (AMA) and preeclampsia, gestational diabetes, and fetal growth restriction.    Recommendations:  Consider low dose aspirin at 12-16 weeks for preeclampsia risk reduction  Because of the increased risk of stillbirth noted in women over the age of 40 at time of delivery, we recommend  weekly fetal surveillance (ex. NST/MVP) starting at 32 weeks until delivery in this group. (This is to be ordered/arranged by primary OB provider)  In this population of women over the age of 40 at time of delivery, consider delivery at 39-40 weeks, but no later than 40 weeks 6 days.    History of loop electrosurgical excision procedure (LEEP) of cervix affecting pregnancy in second trimester  Conization of the cervix has been associated with premature labor and  PROM. Most such deliveries are late  births (34-37 weeks), and many studies show no significant difference in the frequency of infants with birth weight <2500g.  Adverse outcomes may be associated with the depth of cervical cone removed and to the time interval from procedure to conception.    Cervical length measurement will be performed at the time of anatomy scan. Prophylactic cerclage placement solely for history of cone biopsy has not been shown to provide benefit, and it is not recommended.  The patient declines vaginal cervical length assessment today. Abdominal cervical length assessment is normal.       FOLLOW UP:   Follow up anatomy survey has been scheduled in 4 weeks  Recommend primary OB order serial growth ultrasounds q6 weeks thereafter    Today I have spent 30 minutes in the care of the patient. This includes face to face time and non-face to face time preparing to see the patient (eg, review of tests), obtaining and/or reviewing separately obtained history, documenting clinical information in the electronic or other health record, independently interpreting results and communicating results to the patient/family/caregiver, or care coordination.     Laura Rubio MD  Maternal Fetal Medicine

## 2023-07-26 NOTE — ASSESSMENT & PLAN NOTE
Today I counseled the patient on the relationship between maternal age and fetal aneuploidy.  We discussed the risks and benefits of screening tests versus definitive genetic testing (amniocentesis). She was counseled about her specific age-related risk of fetal aneuploidy. We discussed the limitations of ultrasound in the definitive diagnosis of fetal aneuploidy.  Additionally, we discussed the association between advanced maternal age (AMA) and preeclampsia, gestational diabetes, and fetal growth restriction.    Recommendations:   Consider low dose aspirin at 12-16 weeks for preeclampsia risk reduction   Because of the increased risk of stillbirth noted in women over the age of 40 at time of delivery, we recommend weekly fetal surveillance (ex. NST/MVP) starting at 32 weeks until delivery in this group. (This is to be ordered/arranged by primary OB provider)   In this population of women over the age of 40 at time of delivery, consider delivery at 39-40 weeks, but no later than 40 weeks 6 days.

## 2023-07-26 NOTE — ASSESSMENT & PLAN NOTE
Conization of the cervix has been associated with premature labor and  PROM. Most such deliveries are late  births (34-37 weeks), and many studies show no significant difference in the frequency of infants with birth weight <2500g.  Adverse outcomes may be associated with the depth of cervical cone removed and to the time interval from procedure to conception.    Cervical length measurement will be performed at the time of anatomy scan. Prophylactic cerclage placement solely for history of cone biopsy has not been shown to provide benefit, and it is not recommended.  The patient declines vaginal cervical length assessment today. Abdominal cervical length assessment is normal.

## 2023-07-26 NOTE — ASSESSMENT & PLAN NOTE
Seizure disorder diagnosed many years ago  Last seizure was at the age of 22, reports she has seizures if she misses her medication. Has been well controlled on Tegretol for years.  Sees neuro at . Has had two uncomplicated pregnancies on Tegretol.   The patient was counseled regarding the usual course of epilepsy during pregnancy and potential maternal and fetal risks (fetal growth restriction and congenital anomalies, list not exclusive) of epilepsy in pregnancy. The majority of patients with epilepsy have a normal pregnancy. Uncontrolled epilepsy confers increased maternal and fetal risks, therefore, the patient was counseled to continue medications as directed by neurologist. She was advised the medication doses may need to be adjusted during pregnancy and in the postpartum period and she should have antiepileptic drug levels measured under the care of her neurologist. The patient was counseled to notify her OB or neurologist should she be unable to tolerate due to nausea and vomiting of pregnancy. Certain types of epilepsy and those using multiple anti-epileptics are increased risk for more seizures during pregnancy. The patient was counseled to avoid triggers (sleep deprivation) and to ensure additional supervision with feeding, changing, and bathing baby after birth.   The AED should be administered at the lowest effective dose/plasma level. We do not routinely recommend discontinuing or changing AED therapy once pregnancy has been established. Medication adjustment is best accomplished pre-pregnancy.    Recommendations:   Continue care with neurology - has follow up scheduled   Continue current medications: Tegretol 600 BID   Folic acid 4 mg daily (ideally started 1-3 months pre-pregnancy)   Antiepileptic drug levels should be monitored per primary neurologist   Detailed anatomic ultrasound at 18-20 weeks - incomplete today   Serial growth ultrasounds q 4-6 weeks, starting at 26-28 weeks   Monitor  AED levels postpartum and return to pre-pregnancy dose as indicated   Ensure adequate support at home after delivery   Notify pediatrics if patient desires to breastfeed   Ensure AED is compatible with planned contraception   Driving restrictions per neurology

## 2023-07-31 ENCOUNTER — PATIENT MESSAGE (OUTPATIENT)
Dept: OTHER | Facility: OTHER | Age: 43
End: 2023-07-31
Payer: COMMERCIAL

## 2023-08-09 ENCOUNTER — ROUTINE PRENATAL (OUTPATIENT)
Dept: OBSTETRICS AND GYNECOLOGY | Facility: CLINIC | Age: 43
End: 2023-08-09
Payer: COMMERCIAL

## 2023-08-09 VITALS
WEIGHT: 168.44 LBS | BODY MASS INDEX: 32.89 KG/M2 | DIASTOLIC BLOOD PRESSURE: 66 MMHG | SYSTOLIC BLOOD PRESSURE: 100 MMHG

## 2023-08-09 DIAGNOSIS — Z98.890 HISTORY OF LOOP ELECTROSURGICAL EXCISION PROCEDURE (LEEP) OF CERVIX AFFECTING PREGNANCY IN SECOND TRIMESTER: ICD-10-CM

## 2023-08-09 DIAGNOSIS — O34.42 HISTORY OF LOOP ELECTROSURGICAL EXCISION PROCEDURE (LEEP) OF CERVIX AFFECTING PREGNANCY IN SECOND TRIMESTER: ICD-10-CM

## 2023-08-09 DIAGNOSIS — O09.522 AMA (ADVANCED MATERNAL AGE) MULTIGRAVIDA 35+, SECOND TRIMESTER: ICD-10-CM

## 2023-08-09 DIAGNOSIS — Z34.82 ENCOUNTER FOR SUPERVISION OF NORMAL PREGNANCY IN MULTIGRAVIDA IN SECOND TRIMESTER: Primary | ICD-10-CM

## 2023-08-09 DIAGNOSIS — G40.909 NONINTRACTABLE EPILEPSY WITHOUT STATUS EPILEPTICUS, UNSPECIFIED EPILEPSY TYPE: ICD-10-CM

## 2023-08-09 DIAGNOSIS — Z3A.21 21 WEEKS GESTATION OF PREGNANCY: ICD-10-CM

## 2023-08-09 PROCEDURE — 99999 PR PBB SHADOW E&M-EST. PATIENT-LVL III: CPT | Mod: PBBFAC,,, | Performed by: STUDENT IN AN ORGANIZED HEALTH CARE EDUCATION/TRAINING PROGRAM

## 2023-08-09 PROCEDURE — 99999 PR PBB SHADOW E&M-EST. PATIENT-LVL III: ICD-10-PCS | Mod: PBBFAC,,, | Performed by: STUDENT IN AN ORGANIZED HEALTH CARE EDUCATION/TRAINING PROGRAM

## 2023-08-09 PROCEDURE — 0502F SUBSEQUENT PRENATAL CARE: CPT | Mod: CPTII,S$GLB,, | Performed by: STUDENT IN AN ORGANIZED HEALTH CARE EDUCATION/TRAINING PROGRAM

## 2023-08-09 PROCEDURE — 0502F PR SUBSEQUENT PRENATAL CARE: ICD-10-PCS | Mod: CPTII,S$GLB,, | Performed by: STUDENT IN AN ORGANIZED HEALTH CARE EDUCATION/TRAINING PROGRAM

## 2023-08-09 NOTE — PROGRESS NOTES
21w2d  Doing well today without any complaints.  Denies abdominal pain, vaginal bleeding, discharge, leakage of fluid, dysuria, headaches.  ASA daily. S/p MFM consult for epilepsy, recommend serial growth scans starting at 28 wks. Will also need weekly BPP at 32 wks for AMA. Follow up anatomy scheduled 8/25. 1 hr GTT/CBC next visit. NICOLAS precautions. RTC 4 wks.

## 2023-08-25 ENCOUNTER — PROCEDURE VISIT (OUTPATIENT)
Dept: MATERNAL FETAL MEDICINE | Facility: CLINIC | Age: 43
End: 2023-08-25
Payer: COMMERCIAL

## 2023-08-25 DIAGNOSIS — Z36.4 ULTRASOUND FOR ANTENATAL SCREENING FOR FETAL GROWTH RESTRICTION: ICD-10-CM

## 2023-08-25 DIAGNOSIS — O99.352 EPILEPSY AFFECTING PREGNANCY IN SECOND TRIMESTER: Primary | ICD-10-CM

## 2023-08-25 DIAGNOSIS — Z36.2 ENCOUNTER FOR FOLLOW-UP ULTRASOUND OF FETAL ANATOMY: ICD-10-CM

## 2023-08-25 DIAGNOSIS — G40.909 NONINTRACTABLE EPILEPSY WITHOUT STATUS EPILEPTICUS, UNSPECIFIED EPILEPSY TYPE: ICD-10-CM

## 2023-08-25 DIAGNOSIS — G40.909 EPILEPSY AFFECTING PREGNANCY IN SECOND TRIMESTER: Primary | ICD-10-CM

## 2023-08-25 PROCEDURE — 76816 OB US FOLLOW-UP PER FETUS: CPT | Mod: S$GLB,,, | Performed by: OBSTETRICS & GYNECOLOGY

## 2023-08-25 PROCEDURE — 76816 PR  US,PREGNANT UTERUS,F/U,TRANSABD APP: ICD-10-PCS | Mod: S$GLB,,, | Performed by: OBSTETRICS & GYNECOLOGY

## 2023-08-28 ENCOUNTER — PATIENT MESSAGE (OUTPATIENT)
Dept: OTHER | Facility: OTHER | Age: 43
End: 2023-08-28
Payer: COMMERCIAL

## 2023-08-28 ENCOUNTER — TELEPHONE (OUTPATIENT)
Dept: OBSTETRICS AND GYNECOLOGY | Facility: CLINIC | Age: 43
End: 2023-08-28
Payer: COMMERCIAL

## 2023-08-28 DIAGNOSIS — Z3A.28 28 WEEKS GESTATION OF PREGNANCY: Primary | ICD-10-CM

## 2023-08-28 NOTE — TELEPHONE ENCOUNTER
----- Message from Asya Garzon MA sent at 8/25/2023  2:22 PM CDT -----  Please schedule Marilu for a 28 and 32 week growth

## 2023-08-28 NOTE — TELEPHONE ENCOUNTER
8/28/2023 1435 PM. Called pt to schedule 28 & 32 week growth scan u/s. Pt states that Fridays work best for her. Informed pt that Dr. Méndez is at [facility] Thursday and Friday. Pt agreed she is fine with seeing Dr. Méndez at [facility]. Pt states she drops son off to school around 830 and she can make it to [facility] for 915. Informed pt that I would schedule u/s and visits at [facility] with Dr. Méndez. Advised pt to check patient portal for appt updates. Pt voiced understanding.

## 2023-08-29 ENCOUNTER — ROUTINE PRENATAL (OUTPATIENT)
Dept: OBSTETRICS AND GYNECOLOGY | Facility: CLINIC | Age: 43
End: 2023-08-29
Payer: COMMERCIAL

## 2023-08-29 VITALS — BODY MASS INDEX: 32.81 KG/M2 | DIASTOLIC BLOOD PRESSURE: 64 MMHG | SYSTOLIC BLOOD PRESSURE: 101 MMHG | WEIGHT: 168 LBS

## 2023-08-29 DIAGNOSIS — Z3A.24 24 WEEKS GESTATION OF PREGNANCY: ICD-10-CM

## 2023-08-29 DIAGNOSIS — O09.522 MULTIGRAVIDA OF ADVANCED MATERNAL AGE IN SECOND TRIMESTER: ICD-10-CM

## 2023-08-29 DIAGNOSIS — Z34.82 ENCOUNTER FOR SUPERVISION OF NORMAL PREGNANCY IN MULTIGRAVIDA IN SECOND TRIMESTER: Primary | ICD-10-CM

## 2023-08-29 DIAGNOSIS — O26.86 POLYMORPHIC ERUPTION OF PREGNANCY: ICD-10-CM

## 2023-08-29 PROCEDURE — 99999 PR PBB SHADOW E&M-EST. PATIENT-LVL III: CPT | Mod: PBBFAC,,, | Performed by: STUDENT IN AN ORGANIZED HEALTH CARE EDUCATION/TRAINING PROGRAM

## 2023-08-29 PROCEDURE — 0502F SUBSEQUENT PRENATAL CARE: CPT | Mod: CPTII,S$GLB,, | Performed by: STUDENT IN AN ORGANIZED HEALTH CARE EDUCATION/TRAINING PROGRAM

## 2023-08-29 PROCEDURE — 0502F PR SUBSEQUENT PRENATAL CARE: ICD-10-PCS | Mod: CPTII,S$GLB,, | Performed by: STUDENT IN AN ORGANIZED HEALTH CARE EDUCATION/TRAINING PROGRAM

## 2023-08-29 PROCEDURE — 99999 PR PBB SHADOW E&M-EST. PATIENT-LVL III: ICD-10-PCS | Mod: PBBFAC,,, | Performed by: STUDENT IN AN ORGANIZED HEALTH CARE EDUCATION/TRAINING PROGRAM

## 2023-08-29 RX ORDER — PROPRANOLOL HYDROCHLORIDE 120 MG/1
120 CAPSULE, EXTENDED RELEASE ORAL EVERY MORNING
COMMUNITY
Start: 2023-08-21

## 2023-08-29 RX ORDER — ASPIRIN 81 MG/1
81 TABLET ORAL DAILY
Status: ON HOLD | COMMUNITY
End: 2023-12-13 | Stop reason: HOSPADM

## 2023-08-29 RX ORDER — TRIAMCINOLONE ACETONIDE 1 MG/G
OINTMENT TOPICAL
Qty: 30 G | Refills: 0 | Status: SHIPPED | OUTPATIENT
Start: 2023-08-29 | End: 2024-01-22

## 2023-08-29 NOTE — PROGRESS NOTES
24w1d  Doing well today. C/o pruritic rash that began within striae of abdomen and has now spread to legs/arms, onset last Tuesday. Tried benadryl cream without relief.   +FM. Denies abdominal pain, vaginal bleeding, discharge, leakage of fluid, dysuria, headaches.  Vesicular rash arising within striae on left mid abdomen with abrasions from scratching, similarly area on right side of abdomen. Smaller vesicular lesions scant throughout arms and legs. Appears c/w PEP, will Rx kenalog BID for 7-14 days. Instructed to take PO Benadryl in the evenings to help with itching symptoms, as well. Pt to update me later this week on symptoms.   Anatomy scan WNL. 1 hr GTT/CBC/tdap next visit. 28 wk growth with MFM. Weekly BPP at 32 wks, ordered and to be scheduled. RTC 4 wks.

## 2023-09-09 DIAGNOSIS — G40.909 NONINTRACTABLE EPILEPSY WITHOUT STATUS EPILEPTICUS, UNSPECIFIED EPILEPSY TYPE: ICD-10-CM

## 2023-09-11 ENCOUNTER — PATIENT MESSAGE (OUTPATIENT)
Dept: OTHER | Facility: OTHER | Age: 43
End: 2023-09-11
Payer: COMMERCIAL

## 2023-09-11 RX ORDER — FOLIC ACID 1 MG/1
4000 TABLET ORAL
Qty: 120 TABLET | Refills: 2 | Status: SHIPPED | OUTPATIENT
Start: 2023-09-11 | End: 2023-11-25

## 2023-09-11 NOTE — TELEPHONE ENCOUNTER
Refill Routing Note   Medication(s) are not appropriate for processing by [facility] for the following reason(s):      Medication outside of protocol  Active Pregnancy    [facility] action(s):  Route Care Due:  None identified     Medication Therapy Plan: Currently Pregnant also out of refill center protocol      Appointments  past 12m or future 3m with PCP    Date Provider   Last Visit   8/29/2023 Aleyda Méndez MD   Next Visit   9/29/2023 Aleyda Méndez MD   ED visits in past 90 days: 0        Note composed:7:00 AM 09/11/2023

## 2023-09-18 ENCOUNTER — TELEPHONE (OUTPATIENT)
Dept: OBSTETRICS AND GYNECOLOGY | Facility: CLINIC | Age: 43
End: 2023-09-18
Payer: COMMERCIAL

## 2023-09-18 NOTE — TELEPHONE ENCOUNTER
Dr Méndez pt calling ob 27wks ,pt needs clearance on getting X ray and a crown on her tooth. Pt cracked it. Please call pt # [phone]       9/18/23 Returned pt's call have letter ready for pt.

## 2023-09-18 NOTE — LETTER
September 18, 2023    Marilu Frausto  [address] [address]              [facility]  [address] [address]  Phone: [phone]  Fax: [phone]      To Whom It May Concern:    Ms. Frausto is currently under our care for pregnancy.  Estimated Date of Delivery: 12/18/2023    Any elective dental work should preferably be scheduled during or after the mid-trimester or postpartum.    Dental procedures can be performed with local anesthesia without epinephrine. Recommended antibiotics include penicillins, erythromycin, and cephalosporins. Tylenol #3 or Percocet may be used for pain control. No x-rays are allowed for routine screening. For dental problems, up to four x-rays may be taken with proper abdominal shield.    Sincerely,    Dr Aleyda Méndez / Rand Martinez LPN

## 2023-09-25 ENCOUNTER — PATIENT MESSAGE (OUTPATIENT)
Dept: OTHER | Facility: OTHER | Age: 43
End: 2023-09-25
Payer: COMMERCIAL

## 2023-09-29 ENCOUNTER — LAB VISIT (OUTPATIENT)
Dept: LAB | Facility: OTHER | Age: 43
End: 2023-09-29
Attending: STUDENT IN AN ORGANIZED HEALTH CARE EDUCATION/TRAINING PROGRAM
Payer: COMMERCIAL

## 2023-09-29 ENCOUNTER — PROCEDURE VISIT (OUTPATIENT)
Dept: OBSTETRICS AND GYNECOLOGY | Facility: CLINIC | Age: 43
End: 2023-09-29
Attending: STUDENT IN AN ORGANIZED HEALTH CARE EDUCATION/TRAINING PROGRAM
Payer: COMMERCIAL

## 2023-09-29 VITALS
DIASTOLIC BLOOD PRESSURE: 68 MMHG | BODY MASS INDEX: 33.02 KG/M2 | WEIGHT: 169.06 LBS | SYSTOLIC BLOOD PRESSURE: 100 MMHG

## 2023-09-29 DIAGNOSIS — O09.522 MULTIGRAVIDA OF ADVANCED MATERNAL AGE IN SECOND TRIMESTER: ICD-10-CM

## 2023-09-29 DIAGNOSIS — Z3A.28 28 WEEKS GESTATION OF PREGNANCY: ICD-10-CM

## 2023-09-29 DIAGNOSIS — O26.86 POLYMORPHIC ERUPTION OF PREGNANCY: ICD-10-CM

## 2023-09-29 DIAGNOSIS — Z3A.24 24 WEEKS GESTATION OF PREGNANCY: ICD-10-CM

## 2023-09-29 DIAGNOSIS — G40.909 EPILEPSY COMPLICATING PREGNANCY IN SECOND TRIMESTER: ICD-10-CM

## 2023-09-29 DIAGNOSIS — O99.352 EPILEPSY COMPLICATING PREGNANCY IN SECOND TRIMESTER: ICD-10-CM

## 2023-09-29 DIAGNOSIS — Z34.83 ENCOUNTER FOR SUPERVISION OF NORMAL PREGNANCY IN MULTIGRAVIDA IN THIRD TRIMESTER: Primary | ICD-10-CM

## 2023-09-29 LAB
BASOPHILS # BLD AUTO: 0.08 K/UL (ref 0–0.2)
BASOPHILS NFR BLD: 0.7 % (ref 0–1.9)
DIFFERENTIAL METHOD: ABNORMAL
EOSINOPHIL # BLD AUTO: 0.3 K/UL (ref 0–0.5)
EOSINOPHIL NFR BLD: 2.8 % (ref 0–8)
ERYTHROCYTE [DISTWIDTH] IN BLOOD BY AUTOMATED COUNT: 13.5 % (ref 11.5–14.5)
GLUCOSE SERPL-MCNC: 126 MG/DL (ref 70–140)
HCT VFR BLD AUTO: 35.4 % (ref 37–48.5)
HGB BLD-MCNC: 11.1 G/DL (ref 12–16)
IMM GRANULOCYTES # BLD AUTO: 0.06 K/UL (ref 0–0.04)
IMM GRANULOCYTES NFR BLD AUTO: 0.5 % (ref 0–0.5)
LYMPHOCYTES # BLD AUTO: 2.1 K/UL (ref 1–4.8)
LYMPHOCYTES NFR BLD: 18.9 % (ref 18–48)
MCH RBC QN AUTO: 28.6 PG (ref 27–31)
MCHC RBC AUTO-ENTMCNC: 31.4 G/DL (ref 32–36)
MCV RBC AUTO: 91 FL (ref 82–98)
MONOCYTES # BLD AUTO: 0.7 K/UL (ref 0.3–1)
MONOCYTES NFR BLD: 6.5 % (ref 4–15)
NEUTROPHILS # BLD AUTO: 7.9 K/UL (ref 1.8–7.7)
NEUTROPHILS NFR BLD: 70.6 % (ref 38–73)
NRBC BLD-RTO: 0 /100 WBC
PLATELET # BLD AUTO: 447 K/UL (ref 150–450)
PMV BLD AUTO: 8.2 FL (ref 9.2–12.9)
RBC # BLD AUTO: 3.88 M/UL (ref 4–5.4)
WBC # BLD AUTO: 11.19 K/UL (ref 3.9–12.7)

## 2023-09-29 PROCEDURE — 76816 OB US FOLLOW-UP PER FETUS: CPT | Mod: S$GLB,,, | Performed by: OBSTETRICS & GYNECOLOGY

## 2023-09-29 PROCEDURE — 99999 PR PBB SHADOW E&M-EST. PATIENT-LVL III: ICD-10-PCS | Mod: PBBFAC,,, | Performed by: STUDENT IN AN ORGANIZED HEALTH CARE EDUCATION/TRAINING PROGRAM

## 2023-09-29 PROCEDURE — 99999 PR PBB SHADOW E&M-EST. PATIENT-LVL III: CPT | Mod: PBBFAC,,, | Performed by: STUDENT IN AN ORGANIZED HEALTH CARE EDUCATION/TRAINING PROGRAM

## 2023-09-29 PROCEDURE — 76816 US OB/GYN EXTENDED PROCEDURE (VIEWPOINT): ICD-10-PCS | Mod: S$GLB,,, | Performed by: OBSTETRICS & GYNECOLOGY

## 2023-09-29 PROCEDURE — 36415 COLL VENOUS BLD VENIPUNCTURE: CPT | Performed by: STUDENT IN AN ORGANIZED HEALTH CARE EDUCATION/TRAINING PROGRAM

## 2023-09-29 PROCEDURE — 0502F SUBSEQUENT PRENATAL CARE: CPT | Mod: CPTII,S$GLB,, | Performed by: STUDENT IN AN ORGANIZED HEALTH CARE EDUCATION/TRAINING PROGRAM

## 2023-09-29 PROCEDURE — 85025 COMPLETE CBC W/AUTO DIFF WBC: CPT | Performed by: STUDENT IN AN ORGANIZED HEALTH CARE EDUCATION/TRAINING PROGRAM

## 2023-09-29 PROCEDURE — 82950 GLUCOSE TEST: CPT | Performed by: STUDENT IN AN ORGANIZED HEALTH CARE EDUCATION/TRAINING PROGRAM

## 2023-09-29 PROCEDURE — 0502F PR SUBSEQUENT PRENATAL CARE: ICD-10-PCS | Mod: CPTII,S$GLB,, | Performed by: STUDENT IN AN ORGANIZED HEALTH CARE EDUCATION/TRAINING PROGRAM

## 2023-09-29 RX ORDER — HYDROXYZINE PAMOATE 25 MG/1
25 CAPSULE ORAL EVERY 4 HOURS PRN
Qty: 30 CAPSULE | Refills: 3 | Status: SHIPPED | OUTPATIENT
Start: 2023-09-29 | End: 2024-01-22

## 2023-09-29 NOTE — PROGRESS NOTES
28w4d  Doing well today with no complaints. Continues to have pruritic rash within striae of abdomen - the kenalog cream helped but now symptoms returning, benadryl cream works, rx vistaril to pharmacy, she will also reach out to her Derm to assess.   Excellent FM. Denies vaginal bleeding, discharge, contractions, leakage of fluid, headaches, vision changes.  1 hr GTT/CBC today. Tdap and flu shots in pharmacy today. Growth scan today EFW 63%, FHR 126bpm, transverse. Serial growth scans and weekly testing at 32 weeks.  Peds: [facility]. PPC: [facility]. Will request repeat c/s with [facility] on 12/11/23.   Labor, preE, FMC precautions provided. RTC 2 wk or earlier if needed.

## 2023-10-09 ENCOUNTER — PATIENT MESSAGE (OUTPATIENT)
Dept: OTHER | Facility: OTHER | Age: 43
End: 2023-10-09
Payer: COMMERCIAL

## 2023-10-12 ENCOUNTER — LAB VISIT (OUTPATIENT)
Dept: LAB | Facility: OTHER | Age: 43
End: 2023-10-12
Attending: STUDENT IN AN ORGANIZED HEALTH CARE EDUCATION/TRAINING PROGRAM
Payer: COMMERCIAL

## 2023-10-12 ENCOUNTER — ROUTINE PRENATAL (OUTPATIENT)
Dept: OBSTETRICS AND GYNECOLOGY | Facility: CLINIC | Age: 43
End: 2023-10-12
Attending: STUDENT IN AN ORGANIZED HEALTH CARE EDUCATION/TRAINING PROGRAM
Payer: COMMERCIAL

## 2023-10-12 VITALS — SYSTOLIC BLOOD PRESSURE: 98 MMHG | WEIGHT: 172.75 LBS | DIASTOLIC BLOOD PRESSURE: 62 MMHG | BODY MASS INDEX: 33.73 KG/M2

## 2023-10-12 DIAGNOSIS — R06.02 SHORTNESS OF BREATH DURING PREGNANCY: ICD-10-CM

## 2023-10-12 DIAGNOSIS — O26.86 POLYMORPHIC ERUPTION OF PREGNANCY: ICD-10-CM

## 2023-10-12 DIAGNOSIS — O99.891 SHORTNESS OF BREATH DURING PREGNANCY: ICD-10-CM

## 2023-10-12 DIAGNOSIS — O09.522 MULTIGRAVIDA OF ADVANCED MATERNAL AGE IN SECOND TRIMESTER: ICD-10-CM

## 2023-10-12 DIAGNOSIS — Z34.83 ENCOUNTER FOR SUPERVISION OF NORMAL PREGNANCY IN MULTIGRAVIDA IN THIRD TRIMESTER: Primary | ICD-10-CM

## 2023-10-12 DIAGNOSIS — Z3A.30 30 WEEKS GESTATION OF PREGNANCY: ICD-10-CM

## 2023-10-12 LAB
ALBUMIN SERPL BCP-MCNC: 2.5 G/DL (ref 3.5–5.2)
ALP SERPL-CCNC: 112 U/L (ref 55–135)
ALT SERPL W/O P-5'-P-CCNC: 14 U/L (ref 10–44)
ANION GAP SERPL CALC-SCNC: 11 MMOL/L (ref 8–16)
AST SERPL-CCNC: 19 U/L (ref 10–40)
BASOPHILS # BLD AUTO: 0.07 K/UL (ref 0–0.2)
BASOPHILS NFR BLD: 0.6 % (ref 0–1.9)
BILIRUB SERPL-MCNC: 0.2 MG/DL (ref 0.1–1)
BNP SERPL-MCNC: 12 PG/ML (ref 0–99)
BUN SERPL-MCNC: 6 MG/DL (ref 6–20)
CALCIUM SERPL-MCNC: 8.5 MG/DL (ref 8.7–10.5)
CHLORIDE SERPL-SCNC: 108 MMOL/L (ref 95–110)
CO2 SERPL-SCNC: 17 MMOL/L (ref 23–29)
CREAT SERPL-MCNC: 0.7 MG/DL (ref 0.5–1.4)
DIFFERENTIAL METHOD: ABNORMAL
EOSINOPHIL # BLD AUTO: 0.3 K/UL (ref 0–0.5)
EOSINOPHIL NFR BLD: 3.1 % (ref 0–8)
ERYTHROCYTE [DISTWIDTH] IN BLOOD BY AUTOMATED COUNT: 13.4 % (ref 11.5–14.5)
EST. GFR  (NO RACE VARIABLE): >60 ML/MIN/1.73 M^2
GLUCOSE SERPL-MCNC: 91 MG/DL (ref 70–110)
HCT VFR BLD AUTO: 33.2 % (ref 37–48.5)
HGB BLD-MCNC: 10.6 G/DL (ref 12–16)
IMM GRANULOCYTES # BLD AUTO: 0.05 K/UL (ref 0–0.04)
IMM GRANULOCYTES NFR BLD AUTO: 0.5 % (ref 0–0.5)
LYMPHOCYTES # BLD AUTO: 2.3 K/UL (ref 1–4.8)
LYMPHOCYTES NFR BLD: 20.5 % (ref 18–48)
MCH RBC QN AUTO: 28.2 PG (ref 27–31)
MCHC RBC AUTO-ENTMCNC: 31.9 G/DL (ref 32–36)
MCV RBC AUTO: 88 FL (ref 82–98)
MONOCYTES # BLD AUTO: 0.7 K/UL (ref 0.3–1)
MONOCYTES NFR BLD: 6.7 % (ref 4–15)
NEUTROPHILS # BLD AUTO: 7.6 K/UL (ref 1.8–7.7)
NEUTROPHILS NFR BLD: 68.6 % (ref 38–73)
NRBC BLD-RTO: 0 /100 WBC
PLATELET # BLD AUTO: 436 K/UL (ref 150–450)
PMV BLD AUTO: 8.3 FL (ref 9.2–12.9)
POTASSIUM SERPL-SCNC: 3.7 MMOL/L (ref 3.5–5.1)
PROT SERPL-MCNC: 6.7 G/DL (ref 6–8.4)
RBC # BLD AUTO: 3.76 M/UL (ref 4–5.4)
SODIUM SERPL-SCNC: 136 MMOL/L (ref 136–145)
WBC # BLD AUTO: 11.05 K/UL (ref 3.9–12.7)

## 2023-10-12 PROCEDURE — 85025 COMPLETE CBC W/AUTO DIFF WBC: CPT | Performed by: STUDENT IN AN ORGANIZED HEALTH CARE EDUCATION/TRAINING PROGRAM

## 2023-10-12 PROCEDURE — 36415 COLL VENOUS BLD VENIPUNCTURE: CPT | Performed by: STUDENT IN AN ORGANIZED HEALTH CARE EDUCATION/TRAINING PROGRAM

## 2023-10-12 PROCEDURE — 99999 PR PBB SHADOW E&M-EST. PATIENT-LVL III: CPT | Mod: PBBFAC,,, | Performed by: STUDENT IN AN ORGANIZED HEALTH CARE EDUCATION/TRAINING PROGRAM

## 2023-10-12 PROCEDURE — 0502F SUBSEQUENT PRENATAL CARE: CPT | Mod: CPTII,S$GLB,, | Performed by: STUDENT IN AN ORGANIZED HEALTH CARE EDUCATION/TRAINING PROGRAM

## 2023-10-12 PROCEDURE — 80053 COMPREHEN METABOLIC PANEL: CPT | Performed by: STUDENT IN AN ORGANIZED HEALTH CARE EDUCATION/TRAINING PROGRAM

## 2023-10-12 PROCEDURE — 0502F PR SUBSEQUENT PRENATAL CARE: ICD-10-PCS | Mod: CPTII,S$GLB,, | Performed by: STUDENT IN AN ORGANIZED HEALTH CARE EDUCATION/TRAINING PROGRAM

## 2023-10-12 PROCEDURE — 99999 PR PBB SHADOW E&M-EST. PATIENT-LVL III: ICD-10-PCS | Mod: PBBFAC,,, | Performed by: STUDENT IN AN ORGANIZED HEALTH CARE EDUCATION/TRAINING PROGRAM

## 2023-10-12 PROCEDURE — 83880 ASSAY OF NATRIURETIC PEPTIDE: CPT | Performed by: STUDENT IN AN ORGANIZED HEALTH CARE EDUCATION/TRAINING PROGRAM

## 2023-10-12 NOTE — PROGRESS NOTES
30w3d  Doing well today. C/o occasional SOB episodes at rest, one time occurred while sitting in recliner watching tv, denies palpitations. Vistaril helps with itching.  Excellent FM. Denies vaginal bleeding, discharge, contractions, leakage of fluid, headaches.  CBC, CMP, BNP for SOB. Precautions given. Encouraged hydration.  Labor, preE, FMC precautions provided. RTC 2 wk or earlier if needed.

## 2023-10-23 ENCOUNTER — PATIENT MESSAGE (OUTPATIENT)
Dept: OTHER | Facility: OTHER | Age: 43
End: 2023-10-23
Payer: COMMERCIAL

## 2023-10-26 ENCOUNTER — PATIENT MESSAGE (OUTPATIENT)
Dept: OBSTETRICS AND GYNECOLOGY | Facility: CLINIC | Age: 43
End: 2023-10-26

## 2023-10-26 ENCOUNTER — ROUTINE PRENATAL (OUTPATIENT)
Dept: OBSTETRICS AND GYNECOLOGY | Facility: CLINIC | Age: 43
End: 2023-10-26
Attending: STUDENT IN AN ORGANIZED HEALTH CARE EDUCATION/TRAINING PROGRAM
Payer: COMMERCIAL

## 2023-10-26 VITALS
BODY MASS INDEX: 32.89 KG/M2 | WEIGHT: 168.44 LBS | SYSTOLIC BLOOD PRESSURE: 100 MMHG | DIASTOLIC BLOOD PRESSURE: 70 MMHG

## 2023-10-26 DIAGNOSIS — Z3A.28 28 WEEKS GESTATION OF PREGNANCY: ICD-10-CM

## 2023-10-26 DIAGNOSIS — G40.909 EPILEPSY AFFECTING PREGNANCY IN THIRD TRIMESTER: ICD-10-CM

## 2023-10-26 DIAGNOSIS — Z3A.32 32 WEEKS GESTATION OF PREGNANCY: ICD-10-CM

## 2023-10-26 DIAGNOSIS — O99.353 EPILEPSY AFFECTING PREGNANCY IN THIRD TRIMESTER: ICD-10-CM

## 2023-10-26 DIAGNOSIS — O09.523 AMA (ADVANCED MATERNAL AGE) MULTIGRAVIDA 35+, THIRD TRIMESTER: ICD-10-CM

## 2023-10-26 DIAGNOSIS — Z34.83 ENCOUNTER FOR SUPERVISION OF NORMAL PREGNANCY IN MULTIGRAVIDA IN THIRD TRIMESTER: Primary | ICD-10-CM

## 2023-10-26 PROCEDURE — 0502F PR SUBSEQUENT PRENATAL CARE: ICD-10-PCS | Mod: CPTII,S$GLB,, | Performed by: STUDENT IN AN ORGANIZED HEALTH CARE EDUCATION/TRAINING PROGRAM

## 2023-10-26 PROCEDURE — 76819 FETAL BIOPHYS PROFIL W/O NST: CPT | Mod: S$GLB,,, | Performed by: OBSTETRICS & GYNECOLOGY

## 2023-10-26 PROCEDURE — 76819 US OB/GYN EXTENDED PROCEDURE (VIEWPOINT): ICD-10-PCS | Mod: S$GLB,,, | Performed by: OBSTETRICS & GYNECOLOGY

## 2023-10-26 PROCEDURE — 76816 US OB/GYN EXTENDED PROCEDURE (VIEWPOINT): ICD-10-PCS | Mod: S$GLB,,, | Performed by: OBSTETRICS & GYNECOLOGY

## 2023-10-26 PROCEDURE — 0502F SUBSEQUENT PRENATAL CARE: CPT | Mod: CPTII,S$GLB,, | Performed by: STUDENT IN AN ORGANIZED HEALTH CARE EDUCATION/TRAINING PROGRAM

## 2023-10-26 PROCEDURE — 99999 PR PBB SHADOW E&M-EST. PATIENT-LVL III: CPT | Mod: PBBFAC,,, | Performed by: STUDENT IN AN ORGANIZED HEALTH CARE EDUCATION/TRAINING PROGRAM

## 2023-10-26 PROCEDURE — 76816 OB US FOLLOW-UP PER FETUS: CPT | Mod: S$GLB,,, | Performed by: OBSTETRICS & GYNECOLOGY

## 2023-10-26 PROCEDURE — 99999 PR PBB SHADOW E&M-EST. PATIENT-LVL III: ICD-10-PCS | Mod: PBBFAC,,, | Performed by: STUDENT IN AN ORGANIZED HEALTH CARE EDUCATION/TRAINING PROGRAM

## 2023-10-26 NOTE — PROGRESS NOTES
32w3d  Doing well today with no complaints. SOB episodes at rest have resolved.  Excellent FM. Denies vaginal bleeding, discharge, contractions, leakage of fluid, headaches, vision changes.  32 wk growth today EFW 51% and AC 99%, BPP 8/8, cephalic. Continue serial growth scans and weekly testing.  Labor, preE, FMC precautions provided. RTC 1 wk or earlier if needed.

## 2023-11-03 ENCOUNTER — PATIENT MESSAGE (OUTPATIENT)
Dept: OBSTETRICS AND GYNECOLOGY | Facility: CLINIC | Age: 43
End: 2023-11-03

## 2023-11-03 ENCOUNTER — PROCEDURE VISIT (OUTPATIENT)
Dept: OBSTETRICS AND GYNECOLOGY | Facility: CLINIC | Age: 43
End: 2023-11-03
Attending: STUDENT IN AN ORGANIZED HEALTH CARE EDUCATION/TRAINING PROGRAM
Payer: COMMERCIAL

## 2023-11-03 ENCOUNTER — TELEPHONE (OUTPATIENT)
Dept: OBSTETRICS AND GYNECOLOGY | Facility: CLINIC | Age: 43
End: 2023-11-03

## 2023-11-03 VITALS
BODY MASS INDEX: 24.07 KG/M2 | DIASTOLIC BLOOD PRESSURE: 72 MMHG | WEIGHT: 123.25 LBS | SYSTOLIC BLOOD PRESSURE: 100 MMHG

## 2023-11-03 DIAGNOSIS — O09.522 MULTIGRAVIDA OF ADVANCED MATERNAL AGE IN SECOND TRIMESTER: Primary | ICD-10-CM

## 2023-11-03 DIAGNOSIS — G40.909 EPILEPSY AFFECTING PREGNANCY IN THIRD TRIMESTER: ICD-10-CM

## 2023-11-03 DIAGNOSIS — Z34.83 ENCOUNTER FOR SUPERVISION OF NORMAL PREGNANCY IN MULTIGRAVIDA IN THIRD TRIMESTER: Primary | ICD-10-CM

## 2023-11-03 DIAGNOSIS — O99.353 EPILEPSY AFFECTING PREGNANCY IN THIRD TRIMESTER: ICD-10-CM

## 2023-11-03 DIAGNOSIS — O09.522 MULTIGRAVIDA OF ADVANCED MATERNAL AGE IN SECOND TRIMESTER: ICD-10-CM

## 2023-11-03 DIAGNOSIS — O09.523 AMA (ADVANCED MATERNAL AGE) MULTIGRAVIDA 35+, THIRD TRIMESTER: ICD-10-CM

## 2023-11-03 DIAGNOSIS — Z3A.33 33 WEEKS GESTATION OF PREGNANCY: ICD-10-CM

## 2023-11-03 PROCEDURE — 0502F PR SUBSEQUENT PRENATAL CARE: ICD-10-PCS | Mod: CPTII,S$GLB,, | Performed by: STUDENT IN AN ORGANIZED HEALTH CARE EDUCATION/TRAINING PROGRAM

## 2023-11-03 PROCEDURE — 76819 US OB/GYN EXTENDED PROCEDURE (VIEWPOINT): ICD-10-PCS | Mod: S$GLB,,, | Performed by: OBSTETRICS & GYNECOLOGY

## 2023-11-03 PROCEDURE — 99999 PR PBB SHADOW E&M-EST. PATIENT-LVL III: CPT | Mod: PBBFAC,,, | Performed by: STUDENT IN AN ORGANIZED HEALTH CARE EDUCATION/TRAINING PROGRAM

## 2023-11-03 PROCEDURE — 99999 PR PBB SHADOW E&M-EST. PATIENT-LVL III: ICD-10-PCS | Mod: PBBFAC,,, | Performed by: STUDENT IN AN ORGANIZED HEALTH CARE EDUCATION/TRAINING PROGRAM

## 2023-11-03 PROCEDURE — 76819 FETAL BIOPHYS PROFIL W/O NST: CPT | Mod: S$GLB,,, | Performed by: OBSTETRICS & GYNECOLOGY

## 2023-11-03 PROCEDURE — 0502F SUBSEQUENT PRENATAL CARE: CPT | Mod: CPTII,S$GLB,, | Performed by: STUDENT IN AN ORGANIZED HEALTH CARE EDUCATION/TRAINING PROGRAM

## 2023-11-03 NOTE — PROGRESS NOTES
33w4d  Doing well today. Had a fall onto left knee Halloween night, no abdominal trauma and knee feeling better. Also notes occasional lower back pain, will try heating pad and tylenol and encouraged increasing hydration.  Excellent FM. Denies vaginal bleeding, discharge, reg contractions, leakage of fluid, headaches, vision changes.  Weekly BPP and serial growths: BPP today 8/8, next growth at 36 wks. Neurology appt 12/8, no seizures.  Labor, preE, FMC precautions provided. RTC 2 wk or earlier if needed.

## 2023-11-09 ENCOUNTER — ROUTINE PRENATAL (OUTPATIENT)
Dept: OBSTETRICS AND GYNECOLOGY | Facility: CLINIC | Age: 43
End: 2023-11-09
Attending: STUDENT IN AN ORGANIZED HEALTH CARE EDUCATION/TRAINING PROGRAM
Payer: COMMERCIAL

## 2023-11-09 VITALS
DIASTOLIC BLOOD PRESSURE: 60 MMHG | SYSTOLIC BLOOD PRESSURE: 100 MMHG | BODY MASS INDEX: 33.17 KG/M2 | WEIGHT: 169.88 LBS

## 2023-11-09 DIAGNOSIS — Z34.83 ENCOUNTER FOR SUPERVISION OF NORMAL PREGNANCY IN MULTIGRAVIDA IN THIRD TRIMESTER: Primary | ICD-10-CM

## 2023-11-09 DIAGNOSIS — O09.522 MULTIGRAVIDA OF ADVANCED MATERNAL AGE IN SECOND TRIMESTER: ICD-10-CM

## 2023-11-09 DIAGNOSIS — Z3A.34 34 WEEKS GESTATION OF PREGNANCY: ICD-10-CM

## 2023-11-09 PROCEDURE — 76819 FETAL BIOPHYS PROFIL W/O NST: CPT | Mod: S$GLB,,, | Performed by: OBSTETRICS & GYNECOLOGY

## 2023-11-09 PROCEDURE — 0502F PR SUBSEQUENT PRENATAL CARE: ICD-10-PCS | Mod: CPTII,S$GLB,, | Performed by: STUDENT IN AN ORGANIZED HEALTH CARE EDUCATION/TRAINING PROGRAM

## 2023-11-09 PROCEDURE — 76819 US OB/GYN EXTENDED PROCEDURE (VIEWPOINT): ICD-10-PCS | Mod: S$GLB,,, | Performed by: OBSTETRICS & GYNECOLOGY

## 2023-11-09 PROCEDURE — 0502F SUBSEQUENT PRENATAL CARE: CPT | Mod: CPTII,S$GLB,, | Performed by: STUDENT IN AN ORGANIZED HEALTH CARE EDUCATION/TRAINING PROGRAM

## 2023-11-09 PROCEDURE — 99999 PR PBB SHADOW E&M-EST. PATIENT-LVL III: ICD-10-PCS | Mod: PBBFAC,,, | Performed by: STUDENT IN AN ORGANIZED HEALTH CARE EDUCATION/TRAINING PROGRAM

## 2023-11-09 PROCEDURE — 99999 PR PBB SHADOW E&M-EST. PATIENT-LVL III: CPT | Mod: PBBFAC,,, | Performed by: STUDENT IN AN ORGANIZED HEALTH CARE EDUCATION/TRAINING PROGRAM

## 2023-11-09 NOTE — PROGRESS NOTES
34w3d  Doing well today with no complaints.  Excellent FM. Denies vaginal bleeding, discharge, contractions, leakage of fluid.  BPP 8/8 today. Neuro appt 12/8.  Labor, preE, FMC precautions provided. RTC 1 wk or earlier if needed.

## 2023-11-17 ENCOUNTER — ROUTINE PRENATAL (OUTPATIENT)
Dept: OBSTETRICS AND GYNECOLOGY | Facility: CLINIC | Age: 43
End: 2023-11-17
Attending: STUDENT IN AN ORGANIZED HEALTH CARE EDUCATION/TRAINING PROGRAM
Payer: COMMERCIAL

## 2023-11-17 VITALS
HEART RATE: 61 BPM | WEIGHT: 164 LBS | HEIGHT: 61 IN | DIASTOLIC BLOOD PRESSURE: 65 MMHG | BODY MASS INDEX: 30.96 KG/M2 | SYSTOLIC BLOOD PRESSURE: 97 MMHG

## 2023-11-17 DIAGNOSIS — Z3A.35 35 WEEKS GESTATION OF PREGNANCY: Primary | ICD-10-CM

## 2023-11-17 DIAGNOSIS — O09.522 MULTIGRAVIDA OF ADVANCED MATERNAL AGE IN SECOND TRIMESTER: ICD-10-CM

## 2023-11-17 DIAGNOSIS — O09.523 MULTIGRAVIDA OF ADVANCED MATERNAL AGE IN THIRD TRIMESTER: Primary | ICD-10-CM

## 2023-11-17 PROCEDURE — 99999 PR PBB SHADOW E&M-EST. PATIENT-LVL I: ICD-10-PCS | Mod: PBBFAC,,,

## 2023-11-17 PROCEDURE — 0502F SUBSEQUENT PRENATAL CARE: CPT | Mod: CPTII,S$GLB,,

## 2023-11-17 PROCEDURE — 76819 FETAL BIOPHYS PROFIL W/O NST: CPT | Mod: S$GLB,,, | Performed by: OBSTETRICS & GYNECOLOGY

## 2023-11-17 PROCEDURE — 76819 PR US, OB, FETAL BIOPHYSICAL, W/O NST: ICD-10-PCS | Mod: S$GLB,,, | Performed by: OBSTETRICS & GYNECOLOGY

## 2023-11-17 PROCEDURE — 99999 PR PBB SHADOW E&M-EST. PATIENT-LVL I: CPT | Mod: PBBFAC,,,

## 2023-11-17 PROCEDURE — 0502F PR SUBSEQUENT PRENATAL CARE: ICD-10-PCS | Mod: CPTII,S$GLB,,

## 2023-11-17 NOTE — PROGRESS NOTES
Here for routine OB appt at 35w4d, with no complaints.  BPP today 8/8.  Doing well overall. Reports good FM.  Daily FM counts reinforced.    -LOF/VB/some damián costa CTX.   Reviewed warning signs of Labor and Preeclampsia.   RTC in 1 week.    Discussed Next visit:  Consents, GBS, 3TMS labs

## 2023-11-21 DIAGNOSIS — G40.909 NONINTRACTABLE EPILEPSY WITHOUT STATUS EPILEPTICUS, UNSPECIFIED EPILEPSY TYPE: ICD-10-CM

## 2023-11-21 NOTE — TELEPHONE ENCOUNTER
Refill Routing Note   Medication(s) are not appropriate for processing by [facility] for the following reason(s):        Outside of protocol    [facility] action(s):  Route               Appointments  past 12m or future 3m with PCP    Date Provider   Last Visit   11/9/2023 Aleyda Méndez MD   Next Visit   12/1/2023 Aleyda Méndez MD   ED visits in past 90 days: 0        Note composed:4:21 PM 11/21/2023

## 2023-11-24 ENCOUNTER — PROCEDURE VISIT (OUTPATIENT)
Dept: OBSTETRICS AND GYNECOLOGY | Facility: CLINIC | Age: 43
End: 2023-11-24
Payer: COMMERCIAL

## 2023-11-24 ENCOUNTER — LAB VISIT (OUTPATIENT)
Dept: LAB | Facility: HOSPITAL | Age: 43
End: 2023-11-24
Attending: STUDENT IN AN ORGANIZED HEALTH CARE EDUCATION/TRAINING PROGRAM
Payer: COMMERCIAL

## 2023-11-24 ENCOUNTER — ROUTINE PRENATAL (OUTPATIENT)
Dept: OBSTETRICS AND GYNECOLOGY | Facility: CLINIC | Age: 43
End: 2023-11-24
Payer: COMMERCIAL

## 2023-11-24 VITALS
DIASTOLIC BLOOD PRESSURE: 62 MMHG | BODY MASS INDEX: 30.41 KG/M2 | WEIGHT: 160.94 LBS | SYSTOLIC BLOOD PRESSURE: 118 MMHG

## 2023-11-24 DIAGNOSIS — Z3A.36 36 WEEKS GESTATION OF PREGNANCY: Primary | ICD-10-CM

## 2023-11-24 DIAGNOSIS — Z3A.36 36 WEEKS GESTATION OF PREGNANCY: ICD-10-CM

## 2023-11-24 DIAGNOSIS — Z3A.28 28 WEEKS GESTATION OF PREGNANCY: ICD-10-CM

## 2023-11-24 LAB
BASOPHILS # BLD AUTO: 0.08 K/UL (ref 0–0.2)
BASOPHILS NFR BLD: 0.8 % (ref 0–1.9)
DIFFERENTIAL METHOD: ABNORMAL
EOSINOPHIL # BLD AUTO: 0.3 K/UL (ref 0–0.5)
EOSINOPHIL NFR BLD: 2.9 % (ref 0–8)
ERYTHROCYTE [DISTWIDTH] IN BLOOD BY AUTOMATED COUNT: 17.1 % (ref 11.5–14.5)
HCT VFR BLD AUTO: 40.4 % (ref 37–48.5)
HGB BLD-MCNC: 12.5 G/DL (ref 12–16)
HIV 1+2 AB+HIV1 P24 AG SERPL QL IA: NORMAL
IMM GRANULOCYTES # BLD AUTO: 0.04 K/UL (ref 0–0.04)
IMM GRANULOCYTES NFR BLD AUTO: 0.4 % (ref 0–0.5)
LYMPHOCYTES # BLD AUTO: 1.5 K/UL (ref 1–4.8)
LYMPHOCYTES NFR BLD: 14.2 % (ref 18–48)
MCH RBC QN AUTO: 29.2 PG (ref 27–31)
MCHC RBC AUTO-ENTMCNC: 30.9 G/DL (ref 32–36)
MCV RBC AUTO: 94 FL (ref 82–98)
MONOCYTES # BLD AUTO: 0.8 K/UL (ref 0.3–1)
MONOCYTES NFR BLD: 7.2 % (ref 4–15)
NEUTROPHILS # BLD AUTO: 7.8 K/UL (ref 1.8–7.7)
NEUTROPHILS NFR BLD: 74.5 % (ref 38–73)
NRBC BLD-RTO: 0 /100 WBC
PLATELET # BLD AUTO: 425 K/UL (ref 150–450)
PMV BLD AUTO: 9 FL (ref 9.2–12.9)
RBC # BLD AUTO: 4.28 M/UL (ref 4–5.4)
WBC # BLD AUTO: 10.53 K/UL (ref 3.9–12.7)

## 2023-11-24 PROCEDURE — 87081 CULTURE SCREEN ONLY: CPT | Performed by: STUDENT IN AN ORGANIZED HEALTH CARE EDUCATION/TRAINING PROGRAM

## 2023-11-24 PROCEDURE — 76816 OB US FOLLOW-UP PER FETUS: CPT | Mod: S$GLB,,, | Performed by: OBSTETRICS & GYNECOLOGY

## 2023-11-24 PROCEDURE — 76819 US OB/GYN EXTENDED PROCEDURE (VIEWPOINT): ICD-10-PCS | Mod: S$GLB,,, | Performed by: OBSTETRICS & GYNECOLOGY

## 2023-11-24 PROCEDURE — 36415 COLL VENOUS BLD VENIPUNCTURE: CPT | Performed by: STUDENT IN AN ORGANIZED HEALTH CARE EDUCATION/TRAINING PROGRAM

## 2023-11-24 PROCEDURE — 85025 COMPLETE CBC W/AUTO DIFF WBC: CPT | Performed by: STUDENT IN AN ORGANIZED HEALTH CARE EDUCATION/TRAINING PROGRAM

## 2023-11-24 PROCEDURE — 76819 FETAL BIOPHYS PROFIL W/O NST: CPT | Mod: S$GLB,,, | Performed by: OBSTETRICS & GYNECOLOGY

## 2023-11-24 PROCEDURE — 0502F PR SUBSEQUENT PRENATAL CARE: ICD-10-PCS | Mod: CPTII,S$GLB,, | Performed by: STUDENT IN AN ORGANIZED HEALTH CARE EDUCATION/TRAINING PROGRAM

## 2023-11-24 PROCEDURE — 76816 US OB/GYN EXTENDED PROCEDURE (VIEWPOINT): ICD-10-PCS | Mod: S$GLB,,, | Performed by: OBSTETRICS & GYNECOLOGY

## 2023-11-24 PROCEDURE — 86592 SYPHILIS TEST NON-TREP QUAL: CPT | Performed by: STUDENT IN AN ORGANIZED HEALTH CARE EDUCATION/TRAINING PROGRAM

## 2023-11-24 PROCEDURE — 0502F SUBSEQUENT PRENATAL CARE: CPT | Mod: CPTII,S$GLB,, | Performed by: STUDENT IN AN ORGANIZED HEALTH CARE EDUCATION/TRAINING PROGRAM

## 2023-11-24 PROCEDURE — 99999 PR PBB SHADOW E&M-EST. PATIENT-LVL III: CPT | Mod: PBBFAC,,, | Performed by: STUDENT IN AN ORGANIZED HEALTH CARE EDUCATION/TRAINING PROGRAM

## 2023-11-24 PROCEDURE — 99999 PR PBB SHADOW E&M-EST. PATIENT-LVL III: ICD-10-PCS | Mod: PBBFAC,,, | Performed by: STUDENT IN AN ORGANIZED HEALTH CARE EDUCATION/TRAINING PROGRAM

## 2023-11-24 PROCEDURE — 87389 HIV-1 AG W/HIV-1&-2 AB AG IA: CPT | Performed by: STUDENT IN AN ORGANIZED HEALTH CARE EDUCATION/TRAINING PROGRAM

## 2023-11-24 NOTE — PROGRESS NOTES
at 36w4d   Doing well overall, +FM, -LOF/VB, may have had some irregular CTX but rare and tolerable.   PNT today. No recent seizure-like activity, compliant with medication. 3TMS labs, GBS today. CS, [facility], blood consents reviewed and signed, pt was queried and without questions. [facility] OB ED precautions. RTC weekly until delivery. Pt of Dr. Méndez.

## 2023-11-25 LAB — RPR SER QL: NORMAL

## 2023-11-25 RX ORDER — FOLIC ACID 1 MG/1
4000 TABLET ORAL
Qty: 120 TABLET | Refills: 2 | Status: ON HOLD | OUTPATIENT
Start: 2023-11-25 | End: 2023-12-13 | Stop reason: HOSPADM

## 2023-11-27 LAB — BACTERIA SPEC AEROBE CULT: NORMAL

## 2023-12-01 ENCOUNTER — PROCEDURE VISIT (OUTPATIENT)
Dept: OBSTETRICS AND GYNECOLOGY | Facility: CLINIC | Age: 43
End: 2023-12-01
Attending: STUDENT IN AN ORGANIZED HEALTH CARE EDUCATION/TRAINING PROGRAM
Payer: COMMERCIAL

## 2023-12-01 VITALS — SYSTOLIC BLOOD PRESSURE: 98 MMHG | DIASTOLIC BLOOD PRESSURE: 54 MMHG | BODY MASS INDEX: 31.78 KG/M2 | WEIGHT: 168.19 LBS

## 2023-12-01 DIAGNOSIS — Z3A.37 37 WEEKS GESTATION OF PREGNANCY: ICD-10-CM

## 2023-12-01 DIAGNOSIS — O09.523 AMA (ADVANCED MATERNAL AGE) MULTIGRAVIDA 35+, THIRD TRIMESTER: ICD-10-CM

## 2023-12-01 DIAGNOSIS — O99.353 EPILEPSY AFFECTING PREGNANCY IN THIRD TRIMESTER: ICD-10-CM

## 2023-12-01 DIAGNOSIS — O09.522 MULTIGRAVIDA OF ADVANCED MATERNAL AGE IN SECOND TRIMESTER: ICD-10-CM

## 2023-12-01 DIAGNOSIS — G40.909 EPILEPSY AFFECTING PREGNANCY IN THIRD TRIMESTER: ICD-10-CM

## 2023-12-01 DIAGNOSIS — Z34.83 ENCOUNTER FOR SUPERVISION OF NORMAL PREGNANCY IN MULTIGRAVIDA IN THIRD TRIMESTER: Primary | ICD-10-CM

## 2023-12-01 PROCEDURE — 99999 PR PBB SHADOW E&M-EST. PATIENT-LVL III: ICD-10-PCS | Mod: PBBFAC,,, | Performed by: STUDENT IN AN ORGANIZED HEALTH CARE EDUCATION/TRAINING PROGRAM

## 2023-12-01 PROCEDURE — 0502F PR SUBSEQUENT PRENATAL CARE: ICD-10-PCS | Mod: CPTII,S$GLB,, | Performed by: STUDENT IN AN ORGANIZED HEALTH CARE EDUCATION/TRAINING PROGRAM

## 2023-12-01 PROCEDURE — 0502F SUBSEQUENT PRENATAL CARE: CPT | Mod: CPTII,S$GLB,, | Performed by: STUDENT IN AN ORGANIZED HEALTH CARE EDUCATION/TRAINING PROGRAM

## 2023-12-01 PROCEDURE — 76819 US OB/GYN EXTENDED PROCEDURE (VIEWPOINT): ICD-10-PCS | Mod: S$GLB,,, | Performed by: OBSTETRICS & GYNECOLOGY

## 2023-12-01 PROCEDURE — 76819 FETAL BIOPHYS PROFIL W/O NST: CPT | Mod: S$GLB,,, | Performed by: OBSTETRICS & GYNECOLOGY

## 2023-12-01 PROCEDURE — 99999 PR PBB SHADOW E&M-EST. PATIENT-LVL III: CPT | Mod: PBBFAC,,, | Performed by: STUDENT IN AN ORGANIZED HEALTH CARE EDUCATION/TRAINING PROGRAM

## 2023-12-01 NOTE — PROGRESS NOTES
37w4d  Doing well today. C/o thin discharge, pelvic exam with physiologic discharge and negative Nitrazine, reassured.  Excellent FM. Denies vaginal bleeding, contractions, headaches.  BPP today 8/8. Labor, preE, FMC precautions provided. RTC 1 wk or earlier if needed.

## 2023-12-08 ENCOUNTER — ROUTINE PRENATAL (OUTPATIENT)
Dept: OBSTETRICS AND GYNECOLOGY | Facility: CLINIC | Age: 43
End: 2023-12-08
Attending: STUDENT IN AN ORGANIZED HEALTH CARE EDUCATION/TRAINING PROGRAM
Payer: COMMERCIAL

## 2023-12-08 VITALS
HEART RATE: 66 BPM | WEIGHT: 170.5 LBS | SYSTOLIC BLOOD PRESSURE: 101 MMHG | DIASTOLIC BLOOD PRESSURE: 61 MMHG | BODY MASS INDEX: 32.22 KG/M2

## 2023-12-08 DIAGNOSIS — G40.909 EPILEPSY COMPLICATING PREGNANCY IN SECOND TRIMESTER: ICD-10-CM

## 2023-12-08 DIAGNOSIS — Z34.83 ENCOUNTER FOR SUPERVISION OF NORMAL PREGNANCY IN MULTIGRAVIDA IN THIRD TRIMESTER: Primary | ICD-10-CM

## 2023-12-08 DIAGNOSIS — O09.523 MULTIGRAVIDA OF ADVANCED MATERNAL AGE IN THIRD TRIMESTER: ICD-10-CM

## 2023-12-08 DIAGNOSIS — O09.522 MULTIGRAVIDA OF ADVANCED MATERNAL AGE IN SECOND TRIMESTER: ICD-10-CM

## 2023-12-08 DIAGNOSIS — Z3A.38 38 WEEKS GESTATION OF PREGNANCY: ICD-10-CM

## 2023-12-08 DIAGNOSIS — O99.352 EPILEPSY COMPLICATING PREGNANCY IN SECOND TRIMESTER: ICD-10-CM

## 2023-12-08 PROCEDURE — 0502F SUBSEQUENT PRENATAL CARE: CPT | Mod: CPTII,S$GLB,, | Performed by: STUDENT IN AN ORGANIZED HEALTH CARE EDUCATION/TRAINING PROGRAM

## 2023-12-08 PROCEDURE — 76819 US OB/GYN EXTENDED PROCEDURE (VIEWPOINT): ICD-10-PCS | Mod: S$GLB,,, | Performed by: OBSTETRICS & GYNECOLOGY

## 2023-12-08 PROCEDURE — 99999 PR PBB SHADOW E&M-EST. PATIENT-LVL III: ICD-10-PCS | Mod: PBBFAC,,, | Performed by: STUDENT IN AN ORGANIZED HEALTH CARE EDUCATION/TRAINING PROGRAM

## 2023-12-08 PROCEDURE — 0502F PR SUBSEQUENT PRENATAL CARE: ICD-10-PCS | Mod: CPTII,S$GLB,, | Performed by: STUDENT IN AN ORGANIZED HEALTH CARE EDUCATION/TRAINING PROGRAM

## 2023-12-08 PROCEDURE — 99999 PR PBB SHADOW E&M-EST. PATIENT-LVL III: CPT | Mod: PBBFAC,,, | Performed by: STUDENT IN AN ORGANIZED HEALTH CARE EDUCATION/TRAINING PROGRAM

## 2023-12-08 PROCEDURE — 76819 FETAL BIOPHYS PROFIL W/O NST: CPT | Mod: S$GLB,,, | Performed by: OBSTETRICS & GYNECOLOGY

## 2023-12-08 NOTE — H&P
HISTORY AND PHYSICAL                                                OBSTETRICS          Subjective:      Marilu Frausto is a 43 y.o.  female with IUP at 39w0d gestation who presents to L&D for repeat  section with BTL. Pertinent medical history for this pregnancy includes AMA >40, h/o c/s x 2, h/o LEEP, epilepsy (tegretol 600BID), lap appy at 12wks, PEP .  She denies contractions, vaginal bleeding, and leakage of fluid.  Reports normal fetal movement. Care this pregnancy has been with Dr. Méndez.    PMHx:   Past Medical History:   Diagnosis Date    Acute appendicitis 2023    Epilepsy     Migraines        PSHx:   Past Surgical History:   Procedure Laterality Date    CERVICAL BIOPSY  W/ LOOP ELECTRODE EXCISION       SECTION      c/s x 2    LAPAROSCOPIC APPENDECTOMY N/A 2023    Procedure: APPENDECTOMY, LAPAROSCOPIC;  Surgeon: Brandee Arriaza MD;  Location: [facility];  Service: General;  Laterality: N/A;       All:   Review of patient's allergies indicates:   Allergen Reactions    Coconut Hives       Meds: (Not in a hospital admission)      SH:   Social History     Socioeconomic History    Marital status:    Tobacco Use    Smoking status: Former     Current packs/day: 0.00     Types: Cigarettes     Quit date: 2021     Years since quittin.9    Smokeless tobacco: Never   Substance and Sexual Activity    Alcohol use: Not Currently    Drug use: Never    Sexual activity: Yes     Partners: Male     Birth control/protection: None       FH:   Family History   Problem Relation Age of Onset    Hypertension Father     Ovarian cancer Mother     Breast cancer Mother        OBHx:   OB History    Para Term  AB Living   3 2 2 0 0 2   SAB IAB Ectopic Multiple Live Births   0 0 0 0 2      # Outcome Date GA Lbr Carlos/2nd Weight Sex Delivery Anes PTL Lv   3 Current            2 Term 2013 39w0d  4.082 kg (9 lb) M CS-LTranv  N YASSINE      Birth Comments: 9#   1 Term 2009  37w0d  2.92 kg (6 lb 7 oz) M CS-LTranv  N YASSINE      Birth Comments: Patient was induced and broke patient's ribs      Complications: Failure to Progress in Second Stage       Objective:      BP Readings from Last 3 Encounters:   23 101/61   23 (!) 98/54   23 118/62       BMI Readings from Last 1 Encounters:   23 32.22 kg/m²       General:   alert and cooperative   HEENT:  normocephalic, atraumatic   Lungs:   Normal work of breathing   Heart:   Normal cap refill   Abdomen:  gravid, non-tender   Extremities non-tender, no edema   Derm: no rashes or lesions   Psych: appropriate mood and affect   Pelvis:  deferred         Lab Review  Lab Results   Component Value Date    GROUPTRH O POS 2023    HGB 12.5 2023    HCT 40.4 2023     2023    RUBELLAIMMUN Reactive 2023    HEPBSAG Non-reactive 2023    [id number] Non-reactive 2023    RPR Non-reactive 2023    LABCHLA Not Detected 2023    LABNGO Not Detected 2023    LABURIN No growth 2023    OBGLUCOSESCR 126 2023    STREPBCULT No Group B Streptococcus isolated 2023    [id number] Not Detected 2023      Growth scan  at 36w4d: EFW 3183 g, 60%, AC 3+wks ahead     Assessment:     43 y.o.  at 39w0d gestation here for  delivery with BTL 2/2 h/o  delivery x 2 and desires sterilization     Plan:     1. Risks, benefits, alternatives and possible complications of  delivery and transfusion have been discussed in detail with the patient. All questions have been answered, and Ms. Frausto has voiced understanding and agrees to the treatment plan.  2. Consents signed and in chart  3. Admit to Labor and Delivery unit for repeat  delivery  4. Ancef/pepcid ULISES Méndez MD  Obstetrics and Gynecology  [facility]

## 2023-12-08 NOTE — H&P (VIEW-ONLY)
HISTORY AND PHYSICAL                                                OBSTETRICS          Subjective:      Marilu Frausto is a 43 y.o.  female with IUP at 39w0d gestation who presents to L&D for repeat  section with BTL. Pertinent medical history for this pregnancy includes AMA >40, h/o c/s x 2, h/o LEEP, epilepsy (tegretol 600BID), lap appy at 12wks, PEP .  She denies contractions, vaginal bleeding, and leakage of fluid.  Reports normal fetal movement. Care this pregnancy has been with Dr. Méndez.    PMHx:   Past Medical History:   Diagnosis Date    Acute appendicitis 2023    Epilepsy     Migraines        PSHx:   Past Surgical History:   Procedure Laterality Date    CERVICAL BIOPSY  W/ LOOP ELECTRODE EXCISION       SECTION      c/s x 2    LAPAROSCOPIC APPENDECTOMY N/A 2023    Procedure: APPENDECTOMY, LAPAROSCOPIC;  Surgeon: Brandee Arriaza MD;  Location: [facility];  Service: General;  Laterality: N/A;       All:   Review of patient's allergies indicates:   Allergen Reactions    Coconut Hives       Meds: (Not in a hospital admission)      SH:   Social History     Socioeconomic History    Marital status:    Tobacco Use    Smoking status: Former     Current packs/day: 0.00     Types: Cigarettes     Quit date: 2021     Years since quittin.9    Smokeless tobacco: Never   Substance and Sexual Activity    Alcohol use: Not Currently    Drug use: Never    Sexual activity: Yes     Partners: Male     Birth control/protection: None       FH:   Family History   Problem Relation Age of Onset    Hypertension Father     Ovarian cancer Mother     Breast cancer Mother        OBHx:   OB History    Para Term  AB Living   3 2 2 0 0 2   SAB IAB Ectopic Multiple Live Births   0 0 0 0 2      # Outcome Date GA Lbr Carlos/2nd Weight Sex Delivery Anes PTL Lv   3 Current            2 Term 2013 39w0d  4.082 kg (9 lb) M CS-LTranv  N YASSINE      Birth Comments: 9#   1 Term 2009  37w0d  2.92 kg (6 lb 7 oz) M CS-LTranv  N YASSINE      Birth Comments: Patient was induced and broke patient's ribs      Complications: Failure to Progress in Second Stage       Objective:      BP Readings from Last 3 Encounters:   23 101/61   23 (!) 98/54   23 118/62       BMI Readings from Last 1 Encounters:   23 32.22 kg/m²       General:   alert and cooperative   HEENT:  normocephalic, atraumatic   Lungs:   Normal work of breathing   Heart:   Normal cap refill   Abdomen:  gravid, non-tender   Extremities non-tender, no edema   Derm: no rashes or lesions   Psych: appropriate mood and affect   Pelvis:  deferred         Lab Review  Lab Results   Component Value Date    GROUPTRH O POS 2023    HGB 12.5 2023    HCT 40.4 2023     2023    RUBELLAIMMUN Reactive 2023    HEPBSAG Non-reactive 2023    [id number] Non-reactive 2023    RPR Non-reactive 2023    LABCHLA Not Detected 2023    LABNGO Not Detected 2023    LABURIN No growth 2023    OBGLUCOSESCR 126 2023    STREPBCULT No Group B Streptococcus isolated 2023    [id number] Not Detected 2023      Growth scan  at 36w4d: EFW 3183 g, 60%, AC 3+wks ahead     Assessment:     43 y.o.  at 39w0d gestation here for  delivery with BTL 2/2 h/o  delivery x 2 and desires sterilization     Plan:     1. Risks, benefits, alternatives and possible complications of  delivery and transfusion have been discussed in detail with the patient. All questions have been answered, and Ms. Frausto has voiced understanding and agrees to the treatment plan.  2. Consents signed and in chart  3. Admit to Labor and Delivery unit for repeat  delivery  4. Ancef/pepcid ULISES Méndez MD  Obstetrics and Gynecology  [facility]

## 2023-12-08 NOTE — PROGRESS NOTES
38w4d  Doing well today with no complaints.  Good FM. Denies vaginal bleeding, discharge, contractions, leakage of fluid, headaches.  BPP today 8/8.Repeat c/s with BTL on Monday. Hibiclens and preparing for c/s form provided.  Labor, preE, FMC precautions provided.

## 2023-12-11 ENCOUNTER — HOSPITAL ENCOUNTER (INPATIENT)
Facility: OTHER | Age: 43
LOS: 2 days | Discharge: HOME | End: 2023-12-13
Attending: STUDENT IN AN ORGANIZED HEALTH CARE EDUCATION/TRAINING PROGRAM | Admitting: STUDENT IN AN ORGANIZED HEALTH CARE EDUCATION/TRAINING PROGRAM
Payer: COMMERCIAL

## 2023-12-11 ENCOUNTER — ANESTHESIA EVENT (OUTPATIENT)
Dept: OBSTETRICS AND GYNECOLOGY | Facility: OTHER | Age: 43
End: 2023-12-11
Payer: COMMERCIAL

## 2023-12-11 ENCOUNTER — ANESTHESIA (OUTPATIENT)
Dept: OBSTETRICS AND GYNECOLOGY | Facility: OTHER | Age: 43
End: 2023-12-11
Payer: COMMERCIAL

## 2023-12-11 DIAGNOSIS — Z98.891 STATUS POST CESAREAN DELIVERY: Primary | ICD-10-CM

## 2023-12-11 DIAGNOSIS — O26.86 POLYMORPHIC ERUPTION OF PREGNANCY: ICD-10-CM

## 2023-12-11 DIAGNOSIS — Z98.891 HISTORY OF CESAREAN DELIVERY: ICD-10-CM

## 2023-12-11 LAB
ABO + RH BLD: NORMAL
BASOPHILS # BLD AUTO: 0.07 K/UL (ref 0–0.2)
BASOPHILS NFR BLD: 0.7 % (ref 0–1.9)
BLD GP AB SCN CELLS X3 SERPL QL: NORMAL
DIFFERENTIAL METHOD: ABNORMAL
EOSINOPHIL # BLD AUTO: 0.3 K/UL (ref 0–0.5)
EOSINOPHIL NFR BLD: 2.6 % (ref 0–8)
ERYTHROCYTE [DISTWIDTH] IN BLOOD BY AUTOMATED COUNT: 16.4 % (ref 11.5–14.5)
HCT VFR BLD AUTO: 37.4 % (ref 37–48.5)
HGB BLD-MCNC: 12.6 G/DL (ref 12–16)
IMM GRANULOCYTES # BLD AUTO: 0.07 K/UL (ref 0–0.04)
IMM GRANULOCYTES NFR BLD AUTO: 0.7 % (ref 0–0.5)
LYMPHOCYTES # BLD AUTO: 2.7 K/UL (ref 1–4.8)
LYMPHOCYTES NFR BLD: 25.4 % (ref 18–48)
MCH RBC QN AUTO: 29.9 PG (ref 27–31)
MCHC RBC AUTO-ENTMCNC: 33.7 G/DL (ref 32–36)
MCV RBC AUTO: 89 FL (ref 82–98)
MONOCYTES # BLD AUTO: 0.7 K/UL (ref 0.3–1)
MONOCYTES NFR BLD: 7.1 % (ref 4–15)
NEUTROPHILS # BLD AUTO: 6.6 K/UL (ref 1.8–7.7)
NEUTROPHILS NFR BLD: 63.5 % (ref 38–73)
NRBC BLD-RTO: 0 /100 WBC
PLATELET # BLD AUTO: 356 K/UL (ref 150–450)
PMV BLD AUTO: 8.2 FL (ref 9.2–12.9)
RBC # BLD AUTO: 4.21 M/UL (ref 4–5.4)
SPECIMEN OUTDATE: NORMAL
WBC # BLD AUTO: 10.44 K/UL (ref 3.9–12.7)

## 2023-12-11 PROCEDURE — 63600175 PHARM REV CODE 636 W HCPCS: Performed by: STUDENT IN AN ORGANIZED HEALTH CARE EDUCATION/TRAINING PROGRAM

## 2023-12-11 PROCEDURE — 88302 TISSUE EXAM BY PATHOLOGIST: CPT | Performed by: PATHOLOGY

## 2023-12-11 PROCEDURE — 59510 PR FULL ROUT OBSTE CARE,CESAREAN DELIV: ICD-10-PCS | Mod: ,,, | Performed by: STUDENT IN AN ORGANIZED HEALTH CARE EDUCATION/TRAINING PROGRAM

## 2023-12-11 PROCEDURE — 25000003 PHARM REV CODE 250: Performed by: STUDENT IN AN ORGANIZED HEALTH CARE EDUCATION/TRAINING PROGRAM

## 2023-12-11 PROCEDURE — 59514 CESAREAN DELIVERY ONLY: CPT | Mod: 82,,, | Performed by: STUDENT IN AN ORGANIZED HEALTH CARE EDUCATION/TRAINING PROGRAM

## 2023-12-11 PROCEDURE — 71000033 HC RECOVERY, INTIAL HOUR: Performed by: STUDENT IN AN ORGANIZED HEALTH CARE EDUCATION/TRAINING PROGRAM

## 2023-12-11 PROCEDURE — 58611 LIGATE OVIDUCT(S) ADD-ON: CPT | Mod: 82,,, | Performed by: STUDENT IN AN ORGANIZED HEALTH CARE EDUCATION/TRAINING PROGRAM

## 2023-12-11 PROCEDURE — C1751 CATH, INF, PER/CENT/MIDLINE: HCPCS | Performed by: ANESTHESIOLOGY

## 2023-12-11 PROCEDURE — 88302 PR  SURG PATH,LEVEL II: ICD-10-PCS | Mod: 26,,, | Performed by: PATHOLOGY

## 2023-12-11 PROCEDURE — 37000009 HC ANESTHESIA EA ADD 15 MINS: Performed by: STUDENT IN AN ORGANIZED HEALTH CARE EDUCATION/TRAINING PROGRAM

## 2023-12-11 PROCEDURE — 27201423 OPTIME MED/SURG SUP & DEVICES STERILE SUPPLY: Performed by: STUDENT IN AN ORGANIZED HEALTH CARE EDUCATION/TRAINING PROGRAM

## 2023-12-11 PROCEDURE — 58611 PR LIGATION,FALLOPIAN TUBE W/C-SECTION: ICD-10-PCS | Mod: ,,, | Performed by: STUDENT IN AN ORGANIZED HEALTH CARE EDUCATION/TRAINING PROGRAM

## 2023-12-11 PROCEDURE — 59510 CESAREAN DELIVERY: CPT | Mod: ,,, | Performed by: STUDENT IN AN ORGANIZED HEALTH CARE EDUCATION/TRAINING PROGRAM

## 2023-12-11 PROCEDURE — 59514 PRA REAN DELIVERY ONLY: ICD-10-PCS | Mod: AA,,, | Performed by: ANESTHESIOLOGY

## 2023-12-11 PROCEDURE — 86900 BLOOD TYPING SEROLOGIC ABO: CPT | Performed by: STUDENT IN AN ORGANIZED HEALTH CARE EDUCATION/TRAINING PROGRAM

## 2023-12-11 PROCEDURE — 58611 LIGATE OVIDUCT(S) ADD-ON: CPT | Mod: ,,, | Performed by: STUDENT IN AN ORGANIZED HEALTH CARE EDUCATION/TRAINING PROGRAM

## 2023-12-11 PROCEDURE — 85025 COMPLETE CBC W/AUTO DIFF WBC: CPT | Performed by: STUDENT IN AN ORGANIZED HEALTH CARE EDUCATION/TRAINING PROGRAM

## 2023-12-11 PROCEDURE — 88302 TISSUE EXAM BY PATHOLOGIST: CPT | Mod: 26,,, | Performed by: PATHOLOGY

## 2023-12-11 PROCEDURE — 58611 PR LIGATION,FALLOPIAN TUBE W/C-SECTION: ICD-10-PCS | Mod: 82,,, | Performed by: STUDENT IN AN ORGANIZED HEALTH CARE EDUCATION/TRAINING PROGRAM

## 2023-12-11 PROCEDURE — 71000039 HC RECOVERY, EACH ADD'L HOUR: Performed by: STUDENT IN AN ORGANIZED HEALTH CARE EDUCATION/TRAINING PROGRAM

## 2023-12-11 PROCEDURE — 25000003 PHARM REV CODE 250

## 2023-12-11 PROCEDURE — 36004724 HC OB OR TIME LEV III - 1ST 15 MIN: Performed by: STUDENT IN AN ORGANIZED HEALTH CARE EDUCATION/TRAINING PROGRAM

## 2023-12-11 PROCEDURE — 36004725 HC OB OR TIME LEV III - EA ADD 15 MIN: Performed by: STUDENT IN AN ORGANIZED HEALTH CARE EDUCATION/TRAINING PROGRAM

## 2023-12-11 PROCEDURE — 63600175 PHARM REV CODE 636 W HCPCS

## 2023-12-11 PROCEDURE — 59514 CESAREAN DELIVERY ONLY: CPT | Mod: AA,,, | Performed by: ANESTHESIOLOGY

## 2023-12-11 PROCEDURE — 37000008 HC ANESTHESIA 1ST 15 MINUTES: Performed by: STUDENT IN AN ORGANIZED HEALTH CARE EDUCATION/TRAINING PROGRAM

## 2023-12-11 PROCEDURE — 11000001 HC ACUTE MED/SURG PRIVATE ROOM

## 2023-12-11 PROCEDURE — 51702 INSERT TEMP BLADDER CATH: CPT

## 2023-12-11 PROCEDURE — 59514 PR CESAREAN DELIVERY ONLY: ICD-10-PCS | Mod: 82,,, | Performed by: STUDENT IN AN ORGANIZED HEALTH CARE EDUCATION/TRAINING PROGRAM

## 2023-12-11 PROCEDURE — 27200710 HC EPIDURAL INFUSION PUMP SET: Performed by: ANESTHESIOLOGY

## 2023-12-11 RX ORDER — ENOXAPARIN SODIUM 100 MG/ML
40 INJECTION SUBCUTANEOUS EVERY 24 HOURS
Status: DISCONTINUED | OUTPATIENT
Start: 2023-12-11 | End: 2023-12-13 | Stop reason: HOSPADM

## 2023-12-11 RX ORDER — CARBAMAZEPINE 200 MG/1
600 TABLET, EXTENDED RELEASE ORAL 2 TIMES DAILY
Status: DISCONTINUED | OUTPATIENT
Start: 2023-12-11 | End: 2023-12-11

## 2023-12-11 RX ORDER — FAMOTIDINE 10 MG/ML
20 INJECTION INTRAVENOUS
Status: COMPLETED | OUTPATIENT
Start: 2023-12-11 | End: 2023-12-11

## 2023-12-11 RX ORDER — SODIUM CHLORIDE, SODIUM LACTATE, POTASSIUM CHLORIDE, CALCIUM CHLORIDE 600; 310; 30; 20 MG/100ML; MG/100ML; MG/100ML; MG/100ML
INJECTION, SOLUTION INTRAVENOUS CONTINUOUS PRN
Status: DISCONTINUED | OUTPATIENT
Start: 2023-12-11 | End: 2023-12-11

## 2023-12-11 RX ORDER — MISOPROSTOL 200 UG/1
800 TABLET ORAL ONCE AS NEEDED
Status: DISCONTINUED | OUTPATIENT
Start: 2023-12-11 | End: 2023-12-13 | Stop reason: HOSPADM

## 2023-12-11 RX ORDER — TRANEXAMIC ACID 10 MG/ML
1000 INJECTION, SOLUTION INTRAVENOUS EVERY 30 MIN PRN
Status: DISCONTINUED | OUTPATIENT
Start: 2023-12-11 | End: 2023-12-13 | Stop reason: HOSPADM

## 2023-12-11 RX ORDER — METHYLERGONOVINE MALEATE 0.2 MG/ML
200 INJECTION INTRAVENOUS ONCE AS NEEDED
Status: DISCONTINUED | OUTPATIENT
Start: 2023-12-11 | End: 2023-12-13 | Stop reason: HOSPADM

## 2023-12-11 RX ORDER — CARBOPROST TROMETHAMINE 250 UG/ML
250 INJECTION, SOLUTION INTRAMUSCULAR
Status: DISCONTINUED | OUTPATIENT
Start: 2023-12-11 | End: 2023-12-13 | Stop reason: HOSPADM

## 2023-12-11 RX ORDER — OXYTOCIN 10 [USP'U]/ML
INJECTION, SOLUTION INTRAMUSCULAR; INTRAVENOUS
Status: DISCONTINUED | OUTPATIENT
Start: 2023-12-11 | End: 2023-12-11

## 2023-12-11 RX ORDER — IBUPROFEN 400 MG/1
800 TABLET ORAL
Status: DISCONTINUED | OUTPATIENT
Start: 2023-12-12 | End: 2023-12-13 | Stop reason: HOSPADM

## 2023-12-11 RX ORDER — BUPIVACAINE HYDROCHLORIDE 7.5 MG/ML
INJECTION, SOLUTION INTRASPINAL
Status: DISCONTINUED | OUTPATIENT
Start: 2023-12-11 | End: 2023-12-11

## 2023-12-11 RX ORDER — MORPHINE SULFATE 0.5 MG/ML
INJECTION, SOLUTION EPIDURAL; INTRATHECAL; INTRAVENOUS
Status: DISCONTINUED | OUTPATIENT
Start: 2023-12-11 | End: 2023-12-11

## 2023-12-11 RX ORDER — PROCHLORPERAZINE EDISYLATE 5 MG/ML
5 INJECTION INTRAMUSCULAR; INTRAVENOUS EVERY 6 HOURS PRN
Status: DISCONTINUED | OUTPATIENT
Start: 2023-12-11 | End: 2023-12-11

## 2023-12-11 RX ORDER — OXYCODONE HYDROCHLORIDE 10 MG/1
10 TABLET ORAL EVERY 4 HOURS PRN
Status: DISCONTINUED | OUTPATIENT
Start: 2023-12-11 | End: 2023-12-13 | Stop reason: HOSPADM

## 2023-12-11 RX ORDER — NALBUPHINE HYDROCHLORIDE 10 MG/ML
5 INJECTION, SOLUTION INTRAMUSCULAR; INTRAVENOUS; SUBCUTANEOUS ONCE AS NEEDED
Status: DISCONTINUED | OUTPATIENT
Start: 2023-12-11 | End: 2023-12-13 | Stop reason: HOSPADM

## 2023-12-11 RX ORDER — LIDOCAINE HYDROCHLORIDE AND EPINEPHRINE 15; 5 MG/ML; UG/ML
INJECTION, SOLUTION EPIDURAL
Status: DISCONTINUED | OUTPATIENT
Start: 2023-12-11 | End: 2023-12-11

## 2023-12-11 RX ORDER — DIPHENHYDRAMINE HCL 25 MG
25 CAPSULE ORAL EVERY 6 HOURS PRN
Status: DISCONTINUED | OUTPATIENT
Start: 2023-12-11 | End: 2023-12-13 | Stop reason: HOSPADM

## 2023-12-11 RX ORDER — DOCUSATE SODIUM 100 MG/1
200 CAPSULE, LIQUID FILLED ORAL 2 TIMES DAILY
Status: DISCONTINUED | OUTPATIENT
Start: 2023-12-11 | End: 2023-12-13 | Stop reason: HOSPADM

## 2023-12-11 RX ORDER — ONDANSETRON 2 MG/ML
4 INJECTION INTRAMUSCULAR; INTRAVENOUS EVERY 6 HOURS PRN
Status: DISCONTINUED | OUTPATIENT
Start: 2023-12-11 | End: 2023-12-13 | Stop reason: HOSPADM

## 2023-12-11 RX ORDER — CARBAMAZEPINE 200 MG/1
600 TABLET, EXTENDED RELEASE ORAL EVERY 12 HOURS
Status: DISCONTINUED | OUTPATIENT
Start: 2023-12-11 | End: 2023-12-11

## 2023-12-11 RX ORDER — SIMETHICONE 80 MG
1 TABLET,CHEWABLE ORAL EVERY 6 HOURS PRN
Status: DISCONTINUED | OUTPATIENT
Start: 2023-12-11 | End: 2023-12-12

## 2023-12-11 RX ORDER — PROCHLORPERAZINE EDISYLATE 5 MG/ML
5 INJECTION INTRAMUSCULAR; INTRAVENOUS EVERY 6 HOURS PRN
Status: DISCONTINUED | OUTPATIENT
Start: 2023-12-11 | End: 2023-12-13 | Stop reason: HOSPADM

## 2023-12-11 RX ORDER — CARBAMAZEPINE 200 MG/1
600 TABLET, EXTENDED RELEASE ORAL EVERY 12 HOURS
Status: DISCONTINUED | OUTPATIENT
Start: 2023-12-11 | End: 2023-12-13 | Stop reason: HOSPADM

## 2023-12-11 RX ORDER — ONDANSETRON 2 MG/ML
4 INJECTION INTRAMUSCULAR; INTRAVENOUS EVERY 12 HOURS PRN
Status: DISCONTINUED | OUTPATIENT
Start: 2023-12-11 | End: 2023-12-11

## 2023-12-11 RX ORDER — ONDANSETRON HYDROCHLORIDE 2 MG/ML
INJECTION, SOLUTION INTRAMUSCULAR; INTRAVENOUS
Status: DISCONTINUED | OUTPATIENT
Start: 2023-12-11 | End: 2023-12-11

## 2023-12-11 RX ORDER — OXYCODONE HYDROCHLORIDE 5 MG/1
5 TABLET ORAL EVERY 4 HOURS PRN
Status: DISCONTINUED | OUTPATIENT
Start: 2023-12-11 | End: 2023-12-13 | Stop reason: HOSPADM

## 2023-12-11 RX ORDER — ACETAMINOPHEN 500 MG
1000 TABLET ORAL
Status: COMPLETED | OUTPATIENT
Start: 2023-12-11 | End: 2023-12-11

## 2023-12-11 RX ORDER — DIPHENHYDRAMINE HYDROCHLORIDE 50 MG/ML
12.5 INJECTION INTRAMUSCULAR; INTRAVENOUS
Status: DISCONTINUED | OUTPATIENT
Start: 2023-12-11 | End: 2023-12-13 | Stop reason: HOSPADM

## 2023-12-11 RX ORDER — NALOXONE HCL 0.4 MG/ML
0.04 VIAL (ML) INJECTION
Status: DISCONTINUED | OUTPATIENT
Start: 2023-12-11 | End: 2023-12-13 | Stop reason: HOSPADM

## 2023-12-11 RX ORDER — HYDROCORTISONE 25 MG/G
CREAM TOPICAL 3 TIMES DAILY PRN
Status: DISCONTINUED | OUTPATIENT
Start: 2023-12-11 | End: 2023-12-13 | Stop reason: HOSPADM

## 2023-12-11 RX ORDER — PROPRANOLOL HYDROCHLORIDE 40 MG/1
40 TABLET ORAL 3 TIMES DAILY
Status: DISCONTINUED | OUTPATIENT
Start: 2023-12-11 | End: 2023-12-11

## 2023-12-11 RX ORDER — PRENATAL WITH FERROUS FUM AND FOLIC ACID 3080; 920; 120; 400; 22; 1.84; 3; 20; 10; 1; 12; 200; 27; 25; 2 [IU]/1; [IU]/1; MG/1; [IU]/1; MG/1; MG/1; MG/1; MG/1; MG/1; MG/1; UG/1; MG/1; MG/1; MG/1; MG/1
1 TABLET ORAL DAILY
Status: DISCONTINUED | OUTPATIENT
Start: 2023-12-11 | End: 2023-12-13 | Stop reason: HOSPADM

## 2023-12-11 RX ORDER — FENTANYL CITRATE 50 UG/ML
INJECTION, SOLUTION INTRAMUSCULAR; INTRAVENOUS
Status: DISCONTINUED | OUTPATIENT
Start: 2023-12-11 | End: 2023-12-11

## 2023-12-11 RX ORDER — SODIUM CHLORIDE, SODIUM LACTATE, POTASSIUM CHLORIDE, CALCIUM CHLORIDE 600; 310; 30; 20 MG/100ML; MG/100ML; MG/100ML; MG/100ML
INJECTION, SOLUTION INTRAVENOUS CONTINUOUS
Status: DISCONTINUED | OUTPATIENT
Start: 2023-12-11 | End: 2023-12-13 | Stop reason: HOSPADM

## 2023-12-11 RX ORDER — CHLOROPROCAINE HYDROCHLORIDE 30 MG/ML
INJECTION, SOLUTION EPIDURAL; INFILTRATION; INTRACAUDAL; PERINEURAL
Status: DISCONTINUED | OUTPATIENT
Start: 2023-12-11 | End: 2023-12-11

## 2023-12-11 RX ORDER — DEXAMETHASONE SODIUM PHOSPHATE 4 MG/ML
INJECTION, SOLUTION INTRA-ARTICULAR; INTRALESIONAL; INTRAMUSCULAR; INTRAVENOUS; SOFT TISSUE
Status: DISCONTINUED | OUTPATIENT
Start: 2023-12-11 | End: 2023-12-11

## 2023-12-11 RX ORDER — KETOROLAC TROMETHAMINE 30 MG/ML
30 INJECTION, SOLUTION INTRAMUSCULAR; INTRAVENOUS
Status: COMPLETED | OUTPATIENT
Start: 2023-12-11 | End: 2023-12-12

## 2023-12-11 RX ORDER — ACETAMINOPHEN 325 MG/1
650 TABLET ORAL
Status: DISCONTINUED | OUTPATIENT
Start: 2023-12-11 | End: 2023-12-13 | Stop reason: HOSPADM

## 2023-12-11 RX ORDER — OXYTOCIN/RINGER'S LACTATE 30/500 ML
95 PLASTIC BAG, INJECTION (ML) INTRAVENOUS CONTINUOUS
Status: DISCONTINUED | OUTPATIENT
Start: 2023-12-11 | End: 2023-12-13 | Stop reason: HOSPADM

## 2023-12-11 RX ORDER — SODIUM CITRATE AND CITRIC ACID MONOHYDRATE 334; 500 MG/5ML; MG/5ML
30 SOLUTION ORAL
Status: COMPLETED | OUTPATIENT
Start: 2023-12-11 | End: 2023-12-11

## 2023-12-11 RX ORDER — ONDANSETRON 8 MG/1
8 TABLET, ORALLY DISINTEGRATING ORAL EVERY 8 HOURS PRN
Status: DISCONTINUED | OUTPATIENT
Start: 2023-12-11 | End: 2023-12-13 | Stop reason: HOSPADM

## 2023-12-11 RX ORDER — SODIUM CHLORIDE, SODIUM LACTATE, POTASSIUM CHLORIDE, CALCIUM CHLORIDE 600; 310; 30; 20 MG/100ML; MG/100ML; MG/100ML; MG/100ML
INJECTION, SOLUTION INTRAVENOUS CONTINUOUS
Status: DISCONTINUED | OUTPATIENT
Start: 2023-12-11 | End: 2023-12-11

## 2023-12-11 RX ORDER — AMOXICILLIN 250 MG
1 CAPSULE ORAL NIGHTLY PRN
Status: DISCONTINUED | OUTPATIENT
Start: 2023-12-11 | End: 2023-12-13 | Stop reason: HOSPADM

## 2023-12-11 RX ORDER — PROPRANOLOL HYDROCHLORIDE 60 MG/1
120 CAPSULE, EXTENDED RELEASE ORAL DAILY
Status: DISCONTINUED | OUTPATIENT
Start: 2023-12-11 | End: 2023-12-13 | Stop reason: HOSPADM

## 2023-12-11 RX ADMIN — DEXTROSE 2 G: 50 INJECTION, SOLUTION INTRAVENOUS at 07:12

## 2023-12-11 RX ADMIN — OXYTOCIN 3 UNITS: 10 INJECTION, SOLUTION INTRAMUSCULAR; INTRAVENOUS at 07:12

## 2023-12-11 RX ADMIN — ACETAMINOPHEN 650 MG: 325 TABLET, FILM COATED ORAL at 06:12

## 2023-12-11 RX ADMIN — DOCUSATE SODIUM 200 MG: 100 CAPSULE, LIQUID FILLED ORAL at 11:12

## 2023-12-11 RX ADMIN — OXYCODONE HYDROCHLORIDE 10 MG: 10 TABLET ORAL at 06:12

## 2023-12-11 RX ADMIN — SODIUM CHLORIDE, POTASSIUM CHLORIDE, SODIUM LACTATE AND CALCIUM CHLORIDE 500 ML: 600; 310; 30; 20 INJECTION, SOLUTION INTRAVENOUS at 11:12

## 2023-12-11 RX ADMIN — OXYCODONE HYDROCHLORIDE 10 MG: 10 TABLET ORAL at 02:12

## 2023-12-11 RX ADMIN — LIDOCAINE HYDROCHLORIDE,EPINEPHRINE BITARTRATE 3 ML: 15; .005 INJECTION, SOLUTION EPIDURAL; INFILTRATION; INTRACAUDAL; PERINEURAL at 07:12

## 2023-12-11 RX ADMIN — BUPIVACAINE HYDROCHLORIDE IN DEXTROSE 1.6 ML: 7.5 INJECTION, SOLUTION SUBARACHNOID at 07:12

## 2023-12-11 RX ADMIN — SODIUM CHLORIDE, SODIUM LACTATE, POTASSIUM CHLORIDE, AND CALCIUM CHLORIDE: 600; 310; 30; 20 INJECTION, SOLUTION INTRAVENOUS at 07:12

## 2023-12-11 RX ADMIN — OXYTOCIN 3 UNITS: 10 INJECTION, SOLUTION INTRAMUSCULAR; INTRAVENOUS at 08:12

## 2023-12-11 RX ADMIN — Medication 0.1 MG: at 07:12

## 2023-12-11 RX ADMIN — KETOROLAC TROMETHAMINE 30 MG: 30 INJECTION, SOLUTION INTRAMUSCULAR; INTRAVENOUS at 01:12

## 2023-12-11 RX ADMIN — OXYCODONE HYDROCHLORIDE 10 MG: 10 TABLET ORAL at 10:12

## 2023-12-11 RX ADMIN — DEXAMETHASONE SODIUM PHOSPHATE 4 MG: 4 INJECTION INTRA-ARTICULAR; INTRALESIONAL; INTRAMUSCULAR; INTRAVENOUS; SOFT TISSUE at 07:12

## 2023-12-11 RX ADMIN — LIDOCAINE HYDROCHLORIDE 5 ML: 10; .005 INJECTION, SOLUTION EPIDURAL; INFILTRATION; INTRACAUDAL; PERINEURAL at 07:12

## 2023-12-11 RX ADMIN — KETOROLAC TROMETHAMINE 30 MG: 30 INJECTION, SOLUTION INTRAMUSCULAR; INTRAVENOUS at 08:12

## 2023-12-11 RX ADMIN — ACETAMINOPHEN 1000 MG: 500 TABLET ORAL at 06:12

## 2023-12-11 RX ADMIN — FENTANYL CITRATE 10 MCG: 0.05 INJECTION, SOLUTION INTRAMUSCULAR; INTRAVENOUS at 07:12

## 2023-12-11 RX ADMIN — CHLOROPROCAINE HYDROCHLORIDE 5 ML: 30 INJECTION, SOLUTION EPIDURAL; INFILTRATION; INTRACAUDAL; PERINEURAL at 07:12

## 2023-12-11 RX ADMIN — SODIUM CITRATE AND CITRIC ACID MONOHYDRATE 30 ML: 500; 334 SOLUTION ORAL at 06:12

## 2023-12-11 RX ADMIN — Medication 95 MILLI-UNITS/MIN: at 08:12

## 2023-12-11 RX ADMIN — SODIUM CHLORIDE, POTASSIUM CHLORIDE, SODIUM LACTATE AND CALCIUM CHLORIDE 500 ML: 600; 310; 30; 20 INJECTION, SOLUTION INTRAVENOUS at 04:12

## 2023-12-11 RX ADMIN — OXYCODONE HYDROCHLORIDE 10 MG: 10 TABLET ORAL at 11:12

## 2023-12-11 RX ADMIN — ENOXAPARIN SODIUM 40 MG: 40 INJECTION SUBCUTANEOUS at 06:12

## 2023-12-11 RX ADMIN — KETOROLAC TROMETHAMINE 30 MG: 30 INJECTION, SOLUTION INTRAMUSCULAR; INTRAVENOUS at 06:12

## 2023-12-11 RX ADMIN — LIDOCAINE HYDROCHLORIDE AND EPINEPHRINE 3 MG: 15; 5 INJECTION, SOLUTION EPIDURAL at 07:12

## 2023-12-11 RX ADMIN — CARBAMAZEPINE 600 MG: 200 TABLET, EXTENDED RELEASE ORAL at 05:12

## 2023-12-11 RX ADMIN — SODIUM CHLORIDE, POTASSIUM CHLORIDE, SODIUM LACTATE AND CALCIUM CHLORIDE: 600; 310; 30; 20 INJECTION, SOLUTION INTRAVENOUS at 06:12

## 2023-12-11 RX ADMIN — ONDANSETRON 4 MG: 2 INJECTION INTRAMUSCULAR; INTRAVENOUS at 06:12

## 2023-12-11 RX ADMIN — FAMOTIDINE 20 MG: 10 INJECTION, SOLUTION INTRAVENOUS at 06:12

## 2023-12-11 RX ADMIN — PHENYLEPHRINE HYDROCHLORIDE 0.5 MCG/KG/MIN: 10 INJECTION INTRAVENOUS at 07:12

## 2023-12-11 RX ADMIN — ACETAMINOPHEN 650 MG: 325 TABLET, FILM COATED ORAL at 01:12

## 2023-12-11 NOTE — TRANSFER OF CARE
"Anesthesia Transfer of Care Note    Patient: Marilu Frausto    Procedure(s) Performed: Procedure(s) (LRB):   SECTION, WITH TUBAL LIGATION (N/A)    Patient location: Labor and Delivery    Transport from OR: Transported from OR on room air with adequate spontaneous ventilation    Post pain: adequate analgesia    Post assessment: no apparent anesthetic complications and tolerated procedure well    Post vital signs: stable    Level of consciousness: awake and alert    Nausea/Vomiting: no nausea/vomiting    Complications: bladder dysfunction    Transfer of care protocol was followed      Last vitals: Visit Vitals  BP (!) 105/59   Pulse 72   Temp 36.6 °C (97.9 °F) (Oral)   Resp 14   Ht 5' 1" (1.549 m)   Wt 72 kg (158 lb 11.7 oz)   LMP 03/15/2023 (Exact Date)   SpO2 97%   Breastfeeding No   BMI 29.99 kg/m²     "

## 2023-12-11 NOTE — ANESTHESIA PROCEDURE NOTES
CSE    Patient location during procedure: OR    Reason for block: labor analgesia requested by patient and obstetrician    Staffing  Authorizing Provider: Viviana Eddy MD  Performing Provider: Pablo Sage DO    Staffing  Performed by: Pablo Sage DO  Authorized by: Viviana Eddy MD    Preanesthetic Checklist  Completed: patient identified, IV checked, site marked, risks and benefits discussed, surgical consent, monitors and equipment checked, pre-op evaluation and timeout performed  CSE  Patient position: sitting  Prep: ChloraPrep  Patient monitoring: heart rate and continuous pulse ox  Approach: midline  Spinal Needle  Needle type: pencil-tip   Needle gauge: 25 G  Epidural Needle  Injection technique: KIRAN air  Needle type: Tuohy   Needle gauge: 17 G  Needle length: 5 in  Needle insertion depth: 6 cm  Location: L3-4  Needle localization: anatomical landmarks   Catheter  Catheter type: [place]re [place]  Catheter size: 19 G  Catheter at skin depth: 10 cm  Test dose: lidocaine 1.5% with Epi 1-to-200,000  Test dose: 3 mL  Additional Documentation: incremental injection, negative aspiration for CSF and negative aspiration for heme  Assessment  Sensory level: T4   Dermatomal levels determined by pinch or prick

## 2023-12-11 NOTE — PLAN OF CARE
23 0837   OB SCREEN   Assessment Type Discharge Planning Brief Assessment   Source of Information health record   Received Prenatal Care Yes   Any indications/suspicions for None   Is this a teen pregnancy No   Is the baby in NICU No   Indication for adoption/Safe Haven No   Indication for DME/post-acute needs No   HIV (+) No   Any congenital  disorders No   Fetal demise/ death No     Patient has been screened for Social Work discharge planning needs. Based on documentation in medical record, no discharge planning needs are anticipated at this time. Should any discharge planning needs arise, please consult .

## 2023-12-11 NOTE — L&D DELIVERY NOTE
[facility] - Labor & Delivery   Section   Operative Note    SUMMARY     Date of Procedure: 2023     Procedure: Procedure(s) (LRB):   SECTION, WITH TUBAL LIGATION (N/A)    Surgeon(s) and Role:     * Aleyda Méndez MD - Primary     * Nhung Robertson MD - Assisting     * Campos Byrnes MD - Resident - Assisting    Pre-Operative Diagnosis:   IUP at 39w0d  Advanced maternal age  Prior  section x 2  Desires sterilization  Seizure disorder  Polymorphic eruption of pregnancy    Post-Operative Diagnosis:   S/p repeat LTCS with BTL  Same as above    Anesthesia: Spinal/Epidural    Technical Procedures Used: Low transverse  section with bilateral tubal ligation           Description of the Findings of the Procedure:   43 y.o. now  s/p repeat  section with BTL at 39w0d secondary to prior  x 2 and desired sterilization. Pfannensteil incision with low transverse hysterotomy. Thick musculofascial layer from prior scarring. Moderate anterior bladder adhesions. No omental or peritoneal adhesions. AROM clear fluid. Female infant delivered vertex with Apgars of 8 and 9. Placenta manually extracted and cord blood and placenta sent to pathology. Uterus noted to be subsequently firm. Normal appearing u/t/o bilaterally. Hysterotomy closed in one layer. Figure of eight placed at right apex. Great hemostasis. Surgicel powder placed to reinforce. [place] BTL. Subcutaneous layer re-approximated. Incision closed with Monocryl. Primaseal and pressure dressing placed.      Significant Surgical Tasks Conducted by the Assistant(s), if Applicable: retraction, fundal pressure    Complications: No    Blood Loss: 450 ml     Procedure in Detail:  The patient was taken to the operating room where combined epidural/spinal anesthesia was found to be adequate. She was then prepped and draped in standard surgical fashion in the dorsal supine position. Ancef two grams were given prior  to the incision time. Allis test performed and negative. A Pfannenstiel incision was then made with the scalpel and carried through to the underlying fascia using the Bovie cautery. The fascia was then incised in the midline and the incision was extended laterally in an upward fashion. The inferior aspect of the fascial incision was then grasped with Kocher clamps, elevated and the underlying rectus muscles were dissected off bluntly and with Berg scissors. Attention was then turned to the superior aspect of the incision, where in a similar fashion; the fascia was grasped, tented up with the Kocher clamps and the rectus muscles were dissected off bluntly and with the Berg scissors. The rectus muscles were then  in the midline and the peritoneum was identified, grasped with hemostats and entered sharply. The peritoneal incision was then extended superiorly and inferiorly with good visualization of the bladder. The bladder flap was then created with Metzenbaum scissors and smooth pickups. Then the bladder blade was then inserted with the bladder being noted well below our incision. The lower uterine segment was then incised in a transverse fashion with the scalpel. The uterine incision was then extended cephalocaudally. The bladder blade was removed and the infant's head was delivered atraumatically. The infant was dried and cord clamped and cut. The infant was handed off to the waiting pediatricians. Cord blood was collected. The placenta was then removed manually. The uterus exteriorized and cleared of all clots and debris. The uterine incision was then repaired using 1 vicryl in a running locked fashion. Two figure of eight sutures were placed at each apex of hysterotomy to obtain further hemostasis. Right tube identified out to fimbriated end, grasped with Sherwood, window created with Bovie in underlying mesosalpinx and chromic used to tie tube x 2. Tube segment excised. Same procedure performed on left  tube. Great hemostasis. Surgicel placed to hysterotomy to reinforce hemostasis. The gutters were then cleared of all clots using moist lap and the uterus was returned to its normal position in the abdomen. The fascia was then approximated with 1 PDS sutures in a running fashion. The subcutaneous space was irrigated and small bleeders coagulated with the Bovie cautery. Then the skin was then closed using 4-0 Monocryl. The incision was then covered using Aqualcel. The patient tolerated the procedure well. Sponge count, lap and needle count were correct times two.    No qualified resident available for assistance.         Specimens:   Specimen (24h ago, onward)       Start     Ordered    23  Specimen to Pathology, Surgery Gynecology and Obstetrics  Once        Comments: Pre-op Diagnosis: Multigravida of advanced maternal age in second trimester [O09.522]Procedure(s): SECTION, WITH TUBAL LIGATION Number of specimens: 2Name of specimens: right and left fallopian tubes     References:    Click here for ordering Quick Tip   Question Answer Comment   Procedure Type: Gynecology and Obstetrics    Specimen Class: Routine/Screening    Which provider would you like to cc? WILDA MÉNDEZ    Release to patient Immediate        23                    Condition: Good    VTE Risk Mitigation (From admission, onward)           Ordered     enoxaparin injection 40 mg  Daily         23 0754     Place sequential compression device  Until discontinued         23 0541                    Disposition: PACU - hemodynamically stable.    Attestation: Good         Delivery Information for Tory Frausto    Birth information:  YOB: 2022   Time of birth: 7:33 AM   Sex: female   Head Delivery Date/Time: 2023  7:32 AM   Delivery type: , Low Transverse   Gestational Age: 39w0d        Delivery Providers    Delivering clinician: Wilda Méndez MD   Provider Role    Van  "Nhung Davis MD Assisting Surgeon    Muriel Reyes, RN Circulator    NatachaMelissa moss[facility] Tech    Kiara Mckeon RN Registered Nurse              Measurements    Weight: 3370 g  Weight (lbs): 7 lb 6.9 oz  Length: 49.5 cm  Length (in): 19.5"  Head circumference: 33 cm  Chest circumference: 33.7 cm         Apgars    Living status: Living  Apgar Component Scores:  1 min.:  5 min.:  10 min.:  15 min.:  20 min.:    Skin color:  0  1       Heart rate:  2  2       Reflex irritability:  2  2       Muscle tone:  2  2       Respiratory effort:  2  2       Total:  8  9       Apgars assigned by: LORNA MCKEON RN         Operative Delivery    Forceps attempted?: No  Vacuum extractor attempted?: No         Shoulder Dystocia    Shoulder dystocia present?: No           Presentation    Presentation: Vertex  Position: Left Occiput Anterior           Interventions/Resuscitation    Method: Bulb Suctioning, Tactile Stimulation       Cord    Vessels: 3 vessels  Complications: None  Delayed Cord Clamping?: Yes  Cord Clamped Date/Time: 2023  7:34 AM  Cord Blood Disposition: Sent with Baby  Gases Sent?: No  Stem Cell Collection (by MD): No       Placenta    Placenta delivery date/time: 2023 0735  Placenta removal: Manual removal  Placenta appearance: Intact  Placenta disposition: Discarded           Labor Events:       labor: No     Labor Onset Date/Time:         Dilation Complete Date/Time:         Start Pushing Date/Time:         Start Pushing Date/Time:       Rupture Date/Time:            Rupture type: INT (Intact)         Fluid Amount:       Fluid Color:                steroids: None     Antibiotics given for GBS: No     Induction:       Indications for induction:        Augmentation:       Indications for augmentation:       Labor complications: None     Additional complications:          Cervical ripening:                     Delivery:      Episiotomy:       Indication for Episiotomy:     "   Perineal Lacerations:   Repaired:      Periurethral Laceration:   Repaired:     Labial Laceration:   Repaired:     Sulcus Laceration:   Repaired:     Vaginal Laceration:   Repaired:     Cervical Laceration:   Repaired:     Repair suture:       Repair # of packets:       Last Value - EBL - Nursing (mL):       Sum - EBL - Nursing (mL): 0     Last Value - EBL - Anesthesia (mL):      Calculated QBL (mL): 455      Vaginal Sweep Performed:       Surgicount Correct:       Vaginal Packing:   Quantity:       Other providers:       Anesthesia    Method: Spinal, Epidural          Details (if applicable):  Trial of Labor No    Categorization: Repeat    Priority: Routine   Indications for : Prior Uterine Surgery   Incision Type: low transverse     Additional  information:  Forceps:    Vacuum:    Breech:    Observed anomalies    Other (Comments):

## 2023-12-11 NOTE — INTERVAL H&P NOTE
The patient has been examined and the H&P has been reviewed:    I concur with the findings and no changes have occurred since H&P was written.    Surgery risks, benefits and alternative options discussed and understood by patient/family.    Temp:  [97.9 °F (36.6 °C)] 97.9 °F (36.6 °C)  Pulse:  [72-77] 72  Resp:  [14] 14  SpO2:  [97 %-99 %] 97 %  BP: (105-112)/(59-60) 105/59    FHT: Cat 1  [place]: quiet  Fetus cephalic    Plan: To the OR for scheduled repeat  section and bilateral tubal ligation. Consents signed in chart.All questions answered.     Billy Skaggs MD PGY-2  Obstetrics and Gynecology

## 2023-12-11 NOTE — PLAN OF CARE
Problem: Bleeding ( Delivery)  Goal: Bleeding is Controlled  Outcome: Ongoing, Progressing     Problem: Change in Fetal Wellbeing ( Delivery)  Goal: Stable Fetal Wellbeing  Outcome: Ongoing, Progressing     Problem: Infection ( Delivery)  Goal: Absence of Infection Signs and Symptoms  Outcome: Ongoing, Progressing     Problem:  Fall Injury Risk  Goal: Absence of Fall, Infant Drop and Related Injury  Outcome: Ongoing, Progressing     Problem: Skin Injury Risk Increased  Goal: Skin Health and Integrity  Outcome: Ongoing, Progressing     Problem: Bleeding (Postpartum  Delivery)  Goal: Hemostasis  Outcome: Ongoing, Progressing     Problem: Infection (Postpartum  Delivery)  Goal: Absence of Infection Signs and Symptoms  Outcome: Ongoing, Progressing

## 2023-12-11 NOTE — ANESTHESIA PREPROCEDURE EVALUATION
Marilu Frausto is a 43 y.o. female that is  presenting for C section. First C section was due to malpositioning, second one was due to 2nd pregnancy being too close to previous one. No complications with current pregnancy. Tolerated neuraxial well in the past.     OB History    Para Term  AB Living   3 2 2     2   SAB IAB Ectopic Multiple Live Births           2      # Outcome Date GA Lbr Carlos/2nd Weight Sex Delivery Anes PTL Lv   3 Current            2 Term 2013 39w0d  4.082 kg (9 lb) M CS-LTranv  N YASSINE      Birth Comments: 9#   1 Term 2009 37w0d  2.92 kg (6 lb 7 oz) M CS-LTranv  N YASSINE      Birth Comments: Patient was induced and broke patient's ribs      Complications: Failure to Progress in Second Stage       Wt Readings from Last 1 Encounters:   23 0539 72 kg (158 lb 11.7 oz)       BP Readings from Last 3 Encounters:   23 (!) 105/59   23 101/61   23 (!) 98/54       Patient Active Problem List   Diagnosis    Migraine without status migrainosus, not intractable    Epilepsy complicating pregnancy in second trimester    Gait abnormality    Decreased range of motion of left ankle    Decreased strength of lower extremity    Influenza A    Thrombocytosis    Former smoker    Leukocytosis    Chest wall pain    History of loop electrosurgical excision procedure (LEEP) of cervix affecting pregnancy in second trimester    12 weeks gestation of pregnancy    AMA (advanced maternal age) multigravida 35+, third trimester    Polymorphic eruption of pregnancy       Past Surgical History:   Procedure Laterality Date    CERVICAL BIOPSY  W/ LOOP ELECTRODE EXCISION       SECTION      c/s x 2    LAPAROSCOPIC APPENDECTOMY N/A 2023    Procedure: APPENDECTOMY, LAPAROSCOPIC;  Surgeon: Brandee Arriaza MD;  Location: [facility];  Service: General;  Laterality: N/A;       Social History     Socioeconomic History    Marital status:    Tobacco Use     "Smoking status: Former     Current packs/day: 0.00     Types: Cigarettes     Quit date: 2021     Years since quittin.9    Smokeless tobacco: Never   Substance and Sexual Activity    Alcohol use: Not Currently    Drug use: Never    Sexual activity: Yes     Partners: Male     Birth control/protection: None         Chemistry        Component Value Date/Time     10/12/2023 1502    K 3.7 10/12/2023 1502     10/12/2023 1502    CO2 17 (L) 10/12/2023 1502    BUN 6 10/12/2023 1502    CREATININE 0.7 10/12/2023 1502    GLU 91 10/12/2023 1502        Component Value Date/Time    CALCIUM 8.5 (L) 10/12/2023 1502    ALKPHOS 112 10/12/2023 1502    AST 19 10/12/2023 1502    ALT 14 10/12/2023 1502    BILITOT 0.2 10/12/2023 1502    ESTGFRAFRICA >60 2021 0750    EGFRNONAA >60 2021 0750            Lab Results   Component Value Date    WBC 10.44 2023    HGB 12.6 2023    HCT 37.4 2023    MCV 89 2023     2023       No results for input(s): "PT", "INR", "PROTIME", "APTT" in the last 72 hours.                  Pre-op Assessment    I have reviewed the Patient Summary Reports.     I have reviewed the Nursing Notes. I have reviewed the NPO Status.   I have reviewed the Medications.     Review of Systems  Anesthesia Hx:   Neg history of prior surgery.          Denies Family Hx of Anesthesia complications.    Denies Personal Hx of Anesthesia complications.                    Social:  Non-Smoker       Hematology/Oncology:       -- Denies Anemia:                                  Cardiovascular:      Denies Hypertension.   Denies MI.     Denies CABG/stent.     Denies CHF.       ECG has been reviewed.                          Pulmonary:    Denies COPD.  Denies Asthma.                    Renal/:   Denies Chronic Renal Disease.                Hepatic/GI:      Denies GERD. Denies Liver Disease.            Musculoskeletal:         Denies Spine Disorders           "   Neurological:    Denies CVA.   Headaches Seizures                                Endocrine:  Denies Diabetes. Denies Hypothyroidism.              Physical Exam  General: Well nourished, Cooperative, Alert and Oriented    Airway:  Mallampati: II   Tongue: Normal    Dental:  Intact      Anesthesia Plan  Type of Anesthesia, risks & benefits discussed:    Anesthesia Type: Spinal, CSE  Intra-op Monitoring Plan: Standard ASA Monitors  Post Op Pain Control Plan: multimodal analgesia  Informed Consent: Informed consent signed with the Patient and all parties understand the risks and agree with anesthesia plan.  All questions answered.   ASA Score: 2  Day of Surgery Review of History & Physical: H&P Update referred to the surgeon/provider.    Ready For Surgery From Anesthesia Perspective.   .

## 2023-12-12 ENCOUNTER — TELEPHONE (OUTPATIENT)
Dept: OBSTETRICS AND GYNECOLOGY | Facility: CLINIC | Age: 43
End: 2023-12-12
Payer: COMMERCIAL

## 2023-12-12 LAB
BASOPHILS # BLD AUTO: 0.05 K/UL (ref 0–0.2)
BASOPHILS NFR BLD: 0.5 % (ref 0–1.9)
DIFFERENTIAL METHOD: ABNORMAL
EOSINOPHIL # BLD AUTO: 0.2 K/UL (ref 0–0.5)
EOSINOPHIL NFR BLD: 1.9 % (ref 0–8)
ERYTHROCYTE [DISTWIDTH] IN BLOOD BY AUTOMATED COUNT: 16.7 % (ref 11.5–14.5)
HCT VFR BLD AUTO: 30.2 % (ref 37–48.5)
HGB BLD-MCNC: 9.9 G/DL (ref 12–16)
IMM GRANULOCYTES # BLD AUTO: 0.05 K/UL (ref 0–0.04)
IMM GRANULOCYTES NFR BLD AUTO: 0.5 % (ref 0–0.5)
LYMPHOCYTES # BLD AUTO: 2.2 K/UL (ref 1–4.8)
LYMPHOCYTES NFR BLD: 21.8 % (ref 18–48)
MCH RBC QN AUTO: 29.9 PG (ref 27–31)
MCHC RBC AUTO-ENTMCNC: 32.8 G/DL (ref 32–36)
MCV RBC AUTO: 91 FL (ref 82–98)
MONOCYTES # BLD AUTO: 0.6 K/UL (ref 0.3–1)
MONOCYTES NFR BLD: 5.8 % (ref 4–15)
NEUTROPHILS # BLD AUTO: 7.1 K/UL (ref 1.8–7.7)
NEUTROPHILS NFR BLD: 69.5 % (ref 38–73)
NRBC BLD-RTO: 0 /100 WBC
PLATELET # BLD AUTO: 280 K/UL (ref 150–450)
PMV BLD AUTO: 8.5 FL (ref 9.2–12.9)
RBC # BLD AUTO: 3.31 M/UL (ref 4–5.4)
WBC # BLD AUTO: 10.18 K/UL (ref 3.9–12.7)

## 2023-12-12 PROCEDURE — 63600175 PHARM REV CODE 636 W HCPCS: Performed by: STUDENT IN AN ORGANIZED HEALTH CARE EDUCATION/TRAINING PROGRAM

## 2023-12-12 PROCEDURE — 85025 COMPLETE CBC W/AUTO DIFF WBC: CPT | Performed by: STUDENT IN AN ORGANIZED HEALTH CARE EDUCATION/TRAINING PROGRAM

## 2023-12-12 PROCEDURE — 25000003 PHARM REV CODE 250: Performed by: STUDENT IN AN ORGANIZED HEALTH CARE EDUCATION/TRAINING PROGRAM

## 2023-12-12 PROCEDURE — 36415 COLL VENOUS BLD VENIPUNCTURE: CPT | Performed by: STUDENT IN AN ORGANIZED HEALTH CARE EDUCATION/TRAINING PROGRAM

## 2023-12-12 PROCEDURE — 25000003 PHARM REV CODE 250

## 2023-12-12 PROCEDURE — 25000003 PHARM REV CODE 250: Performed by: OBSTETRICS & GYNECOLOGY

## 2023-12-12 PROCEDURE — 11000001 HC ACUTE MED/SURG PRIVATE ROOM

## 2023-12-12 RX ORDER — LANOLIN ALCOHOL/MO/W.PET/CERES
1 CREAM (GRAM) TOPICAL DAILY
Status: DISCONTINUED | OUTPATIENT
Start: 2023-12-12 | End: 2023-12-13 | Stop reason: HOSPADM

## 2023-12-12 RX ORDER — SIMETHICONE 80 MG
1 TABLET,CHEWABLE ORAL 3 TIMES DAILY
Status: DISCONTINUED | OUTPATIENT
Start: 2023-12-12 | End: 2023-12-13 | Stop reason: HOSPADM

## 2023-12-12 RX ADMIN — OXYCODONE HYDROCHLORIDE 10 MG: 10 TABLET ORAL at 05:12

## 2023-12-12 RX ADMIN — ENOXAPARIN SODIUM 40 MG: 40 INJECTION SUBCUTANEOUS at 05:12

## 2023-12-12 RX ADMIN — OXYCODONE HYDROCHLORIDE 10 MG: 10 TABLET ORAL at 02:12

## 2023-12-12 RX ADMIN — ACETAMINOPHEN 650 MG: 325 TABLET, FILM COATED ORAL at 08:12

## 2023-12-12 RX ADMIN — PRENATAL VIT W/ FE FUMARATE-FA TAB 27-0.8 MG 1 TABLET: 27-0.8 TAB at 08:12

## 2023-12-12 RX ADMIN — ACETAMINOPHEN 650 MG: 325 TABLET, FILM COATED ORAL at 01:12

## 2023-12-12 RX ADMIN — DOCUSATE SODIUM 200 MG: 100 CAPSULE, LIQUID FILLED ORAL at 08:12

## 2023-12-12 RX ADMIN — SIMETHICONE 80 MG: 80 TABLET, CHEWABLE ORAL at 01:12

## 2023-12-12 RX ADMIN — CARBAMAZEPINE 600 MG: 200 TABLET, EXTENDED RELEASE ORAL at 05:12

## 2023-12-12 RX ADMIN — ACETAMINOPHEN 650 MG: 325 TABLET, FILM COATED ORAL at 02:12

## 2023-12-12 RX ADMIN — IBUPROFEN 800 MG: 400 TABLET ORAL at 05:12

## 2023-12-12 RX ADMIN — FERROUS SULFATE TAB 325 MG (65 MG ELEMENTAL FE) 1 EACH: 325 (65 FE) TAB at 08:12

## 2023-12-12 RX ADMIN — OXYCODONE HYDROCHLORIDE 10 MG: 10 TABLET ORAL at 10:12

## 2023-12-12 RX ADMIN — IBUPROFEN 800 MG: 400 TABLET ORAL at 08:12

## 2023-12-12 RX ADMIN — PROPRANOLOL HYDROCHLORIDE 120 MG: 60 CAPSULE, EXTENDED RELEASE ORAL at 08:12

## 2023-12-12 RX ADMIN — KETOROLAC TROMETHAMINE 30 MG: 30 INJECTION, SOLUTION INTRAMUSCULAR; INTRAVENOUS at 01:12

## 2023-12-12 RX ADMIN — IBUPROFEN 800 MG: 400 TABLET ORAL at 11:12

## 2023-12-12 NOTE — PLAN OF CARE
Problem: Infection ( Delivery)  Goal: Absence of Infection Signs and Symptoms  Outcome: Ongoing, Progressing     Problem: Respiratory Compromise ( Delivery)  Goal: Effective Oxygenation and Ventilation  Outcome: Ongoing, Progressing     Problem: Skin Injury Risk Increased  Goal: Skin Health and Integrity  Outcome: Ongoing, Progressing     Problem: Adjustment to Role Transition (Postpartum  Delivery)  Goal: Successful Maternal Role Transition  Outcome: Ongoing, Progressing     Problem: Bleeding (Postpartum  Delivery)  Goal: Hemostasis  Outcome: Ongoing, Progressing     Problem: Infection (Postpartum  Delivery)  Goal: Absence of Infection Signs and Symptoms  Outcome: Ongoing, Progressing

## 2023-12-12 NOTE — PROGRESS NOTES
"POSTPARTUM PROGRESS NOTE    Subjective:     PPD/POD#: 1   Procedure: Repeat LTCS with BTL   EGA: 39w0d   N/V: No   F/C: No   Abd Pain: Mild, well-controlled with oral pain medication   Lochia: Moderate, soaking <1 pad/hr   Voiding: Yes   Ambulating: Yes   Bowel fnc: No   Contraception: BTL with C/S     Objective:      Temp:  [97.6 °F (36.4 °C)-98.1 °F (36.7 °C)] 97.9 °F (36.6 °C)  Pulse:  [54-81] 70  Resp:  [16-20] 17  SpO2:  [95 %-99 %] 96 %  BP: ()/(53-69) 103/56    Abdomen: Soft, appropriately tender   Uterus: Firm, no fundal tenderness   Incision: Bandage in place without shadowing     Lab Review    No results for input(s): "NA", "K", "CL", "CO2", "BUN", "CREATININE", "GLU", "PROT", "BILITOT", "ALKPHOS", "ALT", "AST", "MG", "PHOS" in the last 168 hours.    Recent Labs   Lab 12/11/23  0532 12/12/23  0420   WBC 10.44 10.18   HGB 12.6 9.9*   HCT 37.4 30.2*   MCV 89 91    280         I/O    Intake/Output Summary (Last 24 hours) at 12/12/2023 0612  Last data filed at 12/12/2023 0100  Gross per 24 hour   Intake 2382.12 ml   Output 1668 ml   Net 714.12 ml   UOP: 0.65 cc/kg/hr     Assessment and Plan:   Postpartum care:  - Patient doing well.  - Continue routine management and advances.    Anemia   - H/H as above  - QBL: 543 mL  - asymptomatic  - iron/colace    Epilepsy  - Continue home tegretol 600 BID  - Continue home propranolol, 120 ER not on formulary, 40 IR TID on formulary   - asymptomatic     AMA  - encourage ambulation   - Lovenox 40 qd     PEP  - asymptomatic     Cyn Welilngton MD  OB/GYN PGY1     "

## 2023-12-12 NOTE — ANESTHESIA POSTPROCEDURE EVALUATION
Anesthesia Post Evaluation    Patient: Marilu Frausto    Procedure(s) Performed: Procedure(s) (LRB):   SECTION, WITH TUBAL LIGATION (N/A)    Final Anesthesia Type: CSE      Patient location during evaluation: floor  Patient participation: Yes- Able to Participate  Level of consciousness: awake and alert  Post-procedure vital signs: reviewed and stable  Pain management: adequate  Airway patency: patent  JULI mitigation strategies: Use of major conduction anesthesia (spinal/epidural) or peripheral nerve block and Multimodal analgesia  PONV status at discharge: No PONV  Anesthetic complications: no      Cardiovascular status: hemodynamically stable, blood pressure returned to baseline and stable  Respiratory status: unassisted, spontaneous ventilation and room air  Hydration status: euvolemic  Follow-up not needed.              Vitals Value Taken Time   /65 23 0849   Temp 36.8 °C (98.3 °F) 23 0843   Pulse 78 23 0843   Resp 18 23 0843   SpO2 97 % 23 08         Event Time   Out of Recovery 11:05:00         Pain/Grisel Score: Pain Rating Prior to Med Admin: 8 (2023  8:50 AM)  Pain Rating Post Med Admin: 0 (2023  6:10 AM)

## 2023-12-12 NOTE — TELEPHONE ENCOUNTER
Called to check in after c/s yesterday. Per chart review, pt doing well. Left voicemail. Has f/u appt scheduled.

## 2023-12-12 NOTE — OPERATIVE NOTE ADDENDUM
Certification of Assistant at Surgery       Surgery Date: 2023     Participating Surgeons:  Surgeon(s) and Role:     * Aleyda Méndez MD - Primary     * Nhung Robertson MD - Assisting     * Campos Byrnes MD - Resident - Assisting    Procedures:  Procedure(s) (LRB):   SECTION, WITH TUBAL LIGATION (N/A)    Assistant Surgeon's Certification of Necessity:  I understand that section 1842 (b) (6) (d) of the Social Security Act generally prohibits Medicare Part B reasonable charge payment for the services of assistants at surgery in teaching hospitals when qualified residents are available to furnish such services. I certify that the services for which payment is claimed were medically necessary, and that no qualified resident was available to perform the services. I further understand that these services are subject to post-payment review by the Medicare carrier.      Nhung Robertson MD    2023  7:31 PM

## 2023-12-13 VITALS
DIASTOLIC BLOOD PRESSURE: 68 MMHG | BODY MASS INDEX: 29.97 KG/M2 | OXYGEN SATURATION: 96 % | HEART RATE: 79 BPM | SYSTOLIC BLOOD PRESSURE: 116 MMHG | WEIGHT: 158.75 LBS | TEMPERATURE: 99 F | HEIGHT: 61 IN | RESPIRATION RATE: 18 BRPM

## 2023-12-13 PROBLEM — O99.353 EPILEPSY AFFECTING PREGNANCY IN THIRD TRIMESTER: Status: ACTIVE | Noted: 2023-12-13

## 2023-12-13 PROBLEM — Z98.891 HISTORY OF CESAREAN DELIVERY: Status: ACTIVE | Noted: 2023-12-13

## 2023-12-13 PROBLEM — Z98.891 STATUS POST CESAREAN DELIVERY: Status: ACTIVE | Noted: 2023-12-13

## 2023-12-13 PROBLEM — G40.909 EPILEPSY COMPLICATING PREGNANCY IN SECOND TRIMESTER: Status: RESOLVED | Noted: 2021-12-22 | Resolved: 2023-12-13

## 2023-12-13 PROBLEM — O99.352 EPILEPSY COMPLICATING PREGNANCY IN SECOND TRIMESTER: Status: RESOLVED | Noted: 2021-12-22 | Resolved: 2023-12-13

## 2023-12-13 PROBLEM — G40.909 EPILEPSY AFFECTING PREGNANCY IN THIRD TRIMESTER: Status: ACTIVE | Noted: 2023-12-13

## 2023-12-13 LAB
FINAL PATHOLOGIC DIAGNOSIS: NORMAL
GROSS: NORMAL
Lab: NORMAL

## 2023-12-13 PROCEDURE — 25000003 PHARM REV CODE 250: Performed by: OBSTETRICS & GYNECOLOGY

## 2023-12-13 PROCEDURE — 25000003 PHARM REV CODE 250: Performed by: STUDENT IN AN ORGANIZED HEALTH CARE EDUCATION/TRAINING PROGRAM

## 2023-12-13 PROCEDURE — 99024 PR POST-OP FOLLOW-UP VISIT: ICD-10-PCS | Mod: ,,, | Performed by: OBSTETRICS & GYNECOLOGY

## 2023-12-13 PROCEDURE — 99024 POSTOP FOLLOW-UP VISIT: CPT | Mod: ,,, | Performed by: OBSTETRICS & GYNECOLOGY

## 2023-12-13 PROCEDURE — 25000003 PHARM REV CODE 250

## 2023-12-13 RX ORDER — IBUPROFEN 600 MG/1
600 TABLET ORAL EVERY 6 HOURS
Qty: 60 TABLET | Refills: 2 | Status: SHIPPED | OUTPATIENT
Start: 2023-12-13 | End: 2024-01-22

## 2023-12-13 RX ORDER — OXYCODONE HYDROCHLORIDE 10 MG/1
10 TABLET ORAL EVERY 4 HOURS PRN
Qty: 30 TABLET | Refills: 0 | Status: SHIPPED | OUTPATIENT
Start: 2023-12-13 | End: 2024-01-22

## 2023-12-13 RX ORDER — ACETAMINOPHEN 325 MG/1
650 TABLET ORAL EVERY 6 HOURS
Qty: 60 TABLET | Refills: 2 | Status: SHIPPED | OUTPATIENT
Start: 2023-12-13 | End: 2024-01-22

## 2023-12-13 RX ORDER — DOCUSATE SODIUM 100 MG/1
200 CAPSULE, LIQUID FILLED ORAL 2 TIMES DAILY
Qty: 60 CAPSULE | Refills: 2 | Status: SHIPPED | OUTPATIENT
Start: 2023-12-13 | End: 2024-01-22

## 2023-12-13 RX ORDER — FERROUS SULFATE 325(65) MG
325 TABLET, DELAYED RELEASE (ENTERIC COATED) ORAL DAILY
Qty: 60 TABLET | Refills: 2 | Status: SHIPPED | OUTPATIENT
Start: 2023-12-13 | End: 2024-01-22

## 2023-12-13 RX ADMIN — CARBAMAZEPINE 600 MG: 200 TABLET, EXTENDED RELEASE ORAL at 05:12

## 2023-12-13 RX ADMIN — WHITE PETROLATUM: 1.75 OINTMENT TOPICAL at 08:12

## 2023-12-13 RX ADMIN — ACETAMINOPHEN 650 MG: 325 TABLET, FILM COATED ORAL at 05:12

## 2023-12-13 RX ADMIN — IBUPROFEN 800 MG: 400 TABLET ORAL at 08:12

## 2023-12-13 RX ADMIN — PROPRANOLOL HYDROCHLORIDE 120 MG: 60 CAPSULE, EXTENDED RELEASE ORAL at 08:12

## 2023-12-13 RX ADMIN — DOCUSATE SODIUM 200 MG: 100 CAPSULE, LIQUID FILLED ORAL at 08:12

## 2023-12-13 RX ADMIN — SIMETHICONE 80 MG: 80 TABLET, CHEWABLE ORAL at 08:12

## 2023-12-13 RX ADMIN — FERROUS SULFATE TAB 325 MG (65 MG ELEMENTAL FE) 1 EACH: 325 (65 FE) TAB at 08:12

## 2023-12-13 RX ADMIN — ACETAMINOPHEN 650 MG: 325 TABLET, FILM COATED ORAL at 12:12

## 2023-12-13 RX ADMIN — PRENATAL VIT W/ FE FUMARATE-FA TAB 27-0.8 MG 1 TABLET: 27-0.8 TAB at 08:12

## 2023-12-13 NOTE — PROGRESS NOTES
"POSTPARTUM PROGRESS NOTE    Subjective:     PPD/POD#: 2   Procedure: Repeat LTCS with BTL   EGA: 39w0d   N/V: No   F/C: No   Abd Pain: Mild, well-controlled with oral pain medication   Lochia: Mild   Voiding: Yes   Ambulating: Yes   Bowel fnc: No   Contraception: BTL with C/S   Pt desires discharge home today if jerrica function returns and baby cleared for discharge (repeat bilirubin check pending)    Objective:      Temp:  [98 °F (36.7 °C)-98.3 °F (36.8 °C)] 98 °F (36.7 °C)  Pulse:  [76-83] 83  Resp:  [18-20] 18  SpO2:  [94 %-97 %] 96 %  BP: (115-117)/(65-72) 116/72    Abdomen: Soft, appropriately tender, area of raw skin on R abdomen where tape from pressure dressing was removed   Uterus: Firm, no fundal tenderness   Incision: Bandage in place without shadowing     Lab Review    No results for input(s): "NA", "K", "CL", "CO2", "BUN", "CREATININE", "GLU", "PROT", "BILITOT", "ALKPHOS", "ALT", "AST", "MG", "PHOS" in the last 168 hours.    Recent Labs   Lab 23  0532 23  0420   WBC 10.44 10.18   HGB 12.6 9.9*   HCT 37.4 30.2*   MCV 89 91    280         I/O    Intake/Output Summary (Last 24 hours) at 2023 0805  Last data filed at 2023 1530  Gross per 24 hour   Intake 500 ml   Output 570 ml   Net -70 ml        Assessment and Plan:   42 yo  POD#2 s/p RLTCS with BTL c/b acute blood loss anemia, epilepsy, AMA, PEP    Postpartum care:  - Patient doing well  - Pain controlled, ambulating and voiding  - Has not yet passed flatus. PO hydration and ambulation encouraged, and pt to continue bowel regimen  - Baby at bedside, doing well. Repeat bilirubin level pending this AM  - Continue routine management and advances. If baby cleared for discharge and bowel function returns pt would like early discharge today.    2.   Acute blood loss anemia   - H/H as above  - QBL: 543 mL  - asymptomatic  - iron/colace    3.   Epilepsy  - Continue home tegretol 600 BID   - asymptomatic     3.   AMA  - " encourage ambulation   - Lovenox 40 qd     4.   PEP  - asymptomatic   - Continue home propranolol, 120 ER not on formulary, 40 IR TID on formulary    Cyn Wellington MD  OB/GYN PGY1     Attending Attestation  I agree with the above edited resident note. Pt seen and examined, chart and labs reviewed.    Briefly, 42 yo  POD#2 s/p RLTCS with BTL. Doing well, no flatus yet but otherwise meeting appropriate milestones. Baby has repeat bilirubin level pending. If cleared and bowel function returns, pt would like discharge home today.     All questions answered. Cont current mgmt, will f/u bowel function and bili levels to determine disposition.    Monae Acuña MD  OB Hospitalist  2023

## 2023-12-13 NOTE — PLAN OF CARE
12/13/23 1258   Final Note   Assessment Type Final Discharge Note   Anticipated Discharge Disposition Home   Post-Acute Status   Discharge Delays None known at this time

## 2023-12-13 NOTE — DISCHARGE SUMMARY
Delivery Discharge Summary  Obstetrics      Primary OB Clinician: Aleyda Méndez MD      Admission date: 2023  Discharge date: 2023    Disposition: To home, self care    Discharge Diagnosis List:      Patient Active Problem List   Diagnosis    Migraine without status migrainosus, not intractable    Gait abnormality    Decreased range of motion of left ankle    Decreased strength of lower extremity    Influenza A    Thrombocytosis    Former smoker    Leukocytosis    Chest wall pain    History of loop electrosurgical excision procedure (LEEP) of cervix affecting pregnancy in second trimester    12 weeks gestation of pregnancy    AMA (advanced maternal age) multigravida 35+, third trimester    Polymorphic eruption of pregnancy    Status post  delivery    History of  delivery    Epilepsy affecting pregnancy in third trimester       Procedure:  Scheduled repeat  section and BTL    Hospital Course:  Marilu Frausto is a 43 y.o. now , POD #2 who was admitted on 2023 at 39w0d for scheduled repeat  section and BTL. Patient was subsequently admitted to labor and delivery unit with signed consents.     Surgery was performed without complication. Please see delivery note for further details. Her postpartum course was uncomplicated. On discharge day, patient's pain is controlled with oral pain medications. Pt is tolerating ambulation without SOB or CP, and regular diet without N/V. Reports lochia is mild. Denies any HA, vision changes, F/C, LE swelling. Denies any breast pain/soreness.    Pt in stable condition and ready for discharge. She has been instructed to start and/or continue medications and follow up with her obstetrics provider as listed below.    Pertinent studies:  CBC  Recent Labs   Lab 23  0532 23  0420   WBC 10.44 10.18   HGB 12.6 9.9*   HCT 37.4 30.2*   MCV 89 91    280        Immunization History   Administered Date(s) Administered  "   COVID-19, MRNA, LN-S, PF (Pfizer) (Purple Cap) 2021, 2021    Influenza - Quadrivalent - PF *Preferred* (6 months and older) 2023    Tdap 2023        Delivery:    Episiotomy:     Lacerations:     Repair suture:     Repair # of packets:     Blood loss (ml):       Birth information:  YOB: 2022   Time of birth: 7:33 AM   Sex: female   Delivery type: , Low Transverse   Gestational Age: 39w0d     Measurements    Weight: 3370 g  Weight (lbs): 7 lb 6.9 oz  Length: 49.5 cm  Length (in): 19.5"  Head circumference: 33 cm  Chest circumference: 33.7 cm         Delivery Clinician: Delivery Providers    Delivering clinician: Aleyda Méndez MD   Provider Role    Nhung Robertson MD Assisting Surgeon    Muriel Reyes RN Circulator    [facility]    Kiara Mckeon RN Registered Nurse             Additional  information:  Forceps:    Vacuum:    Breech:    Observed anomalies      Living?:     Apgars    Living status: Living  Apgar Component Scores:  1 min.:  5 min.:  10 min.:  15 min.:  20 min.:    Skin color:  0  1       Heart rate:  2  2       Reflex irritability:  2  2       Muscle tone:  2  2       Respiratory effort:  2  2       Total:  8  9       Apgars assigned by: LORNA MCKEON RN         Placenta: Delivered:       appearance    Patient Instructions:   Current Discharge Medication List        START taking these medications    Details   acetaminophen (TYLENOL) 325 MG tablet Take 2 tablets (650 mg total) by mouth every 6 (six) hours.  Qty: 60 tablet, Refills: 2      docusate sodium (COLACE) 100 MG capsule Take 2 capsules (200 mg total) by mouth 2 (two) times daily.  Qty: 60 capsule, Refills: 2      ferrous sulfate 325 (65 FE) MG EC tablet Take 1 tablet (325 mg total) by mouth once daily.  Qty: 60 tablet, Refills: 2      ibuprofen (ADVIL,MOTRIN) 600 MG tablet Take 1 tablet (600 mg total) by mouth every 6 (six) hours.  Qty: 60 tablet, Refills: 2    "   oxyCODONE (ROXICODONE) 10 mg Tab immediate release tablet Take 1 tablet (10 mg total) by mouth every 4 (four) hours as needed for Pain.  Qty: 30 tablet, Refills: 0    Comments: Quantity prescribed more than 7 day supply? No      white petrolatum 41 % Oint Apply topically as needed (skin irritation).  Qty: 50 g, Refills: 0           CONTINUE these medications which have NOT CHANGED    Details   butalbital-acetaminophen-caff -40 mg Cap Take 1 capsule by mouth daily as needed.      hydrOXYzine pamoate (VISTARIL) 25 MG Cap Take 1 capsule (25 mg total) by mouth every 4 (four) hours as needed (itching).  Qty: 30 capsule, Refills: 3    Associated Diagnoses: Polymorphic eruption of pregnancy      INNOPRAN  mg Cp24 TK 1 C PO QAM  Refills: 6      propranoloL (INDERAL LA) 120 MG 24 hr capsule Take 120 mg by mouth every morning.      TEGRETOL  mg 12 hr tablet TK 3 TS PO BID  Refills: 6      triamcinolone acetonide 0.1% (KENALOG) 0.1 % ointment Apply to affected area two to three times daily  Qty: 30 g, Refills: 0    Associated Diagnoses: Polymorphic eruption of pregnancy           STOP taking these medications       aspirin (ECOTRIN) 81 MG EC tablet Comments:   Reason for Stopping:         folic acid (FOLVITE) 1 MG tablet Comments:   Reason for Stopping:               Discharge Procedure Orders   Diet Adult Regular     No driving until:   Order Comments: No driving until not taking narcotic pain medication.     Pelvic Rest   Order Comments: Pelvic rest until 6 weeks after discharge. Nothing in vagina -no sex, tampons, douching, etc.     Notify your health care provider if you experience any of the following:  temperature >100.4     Notify your health care provider if you experience any of the following:  persistent nausea and vomiting or diarrhea     Notify your health care provider if you experience any of the following:  severe uncontrolled pain     Notify your health care provider if you experience any of  the following:  redness, tenderness, or signs of infection (pain, swelling, redness, odor or green/yellow discharge around incision site)     Notify your health care provider if you experience any of the following:  difficulty breathing or increased cough     Notify your health care provider if you experience any of the following:  severe persistent headache     Notify your health care provider if you experience any of the following:  worsening rash     Notify your health care provider if you experience any of the following:  persistent dizziness, light-headedness, or visual disturbances     Notify your health care provider if you experience any of the following:  increased confusion or weakness     Notify your health care provider if you experience any of the following:   Order Comments: Heavy vaginal bleeding saturating more than 1 pad per hr for at least consecutive 2 hrs.     Activity as tolerated        Follow-up Information       Aleyda Méndez MD Follow up in 6 week(s).    Specialty: Obstetrics and Gynecology  Why: Postpartum visit  Contact information:  [facility] [address] [address]  [phone]                              Cyn Wellington MD  OB/GYN PGY1

## 2023-12-13 NOTE — PLAN OF CARE
VSS. Pain controlled with scheduled oral pain medication. Bottle feeding infant w/ formula. Fundus firm and midline with moderate lochia rubra. LTV c/s dressing in place, clean/dry/intact.Voiding spontaneously with adequate output. Passing gas. Discharge orders in per OB. Discharge instructions and s/s of when to contact provider/return to [facility] reviewed, understanding verbalized. Pt to follow up at [facility] ion 12/26/2023. No concerns at this time.

## 2023-12-15 ENCOUNTER — PATIENT MESSAGE (OUTPATIENT)
Dept: OBSTETRICS AND GYNECOLOGY | Facility: CLINIC | Age: 43
End: 2023-12-15
Payer: COMMERCIAL

## 2023-12-26 ENCOUNTER — POSTPARTUM VISIT (OUTPATIENT)
Dept: OBSTETRICS AND GYNECOLOGY | Facility: CLINIC | Age: 43
End: 2023-12-26
Payer: COMMERCIAL

## 2023-12-26 VITALS
DIASTOLIC BLOOD PRESSURE: 80 MMHG | HEIGHT: 61 IN | BODY MASS INDEX: 28.84 KG/M2 | WEIGHT: 152.75 LBS | SYSTOLIC BLOOD PRESSURE: 120 MMHG

## 2023-12-26 PROCEDURE — 0503F PR POSTPARTUM CARE VISIT: ICD-10-PCS | Mod: CPTII,S$GLB,,

## 2023-12-26 PROCEDURE — 99999 PR PBB SHADOW E&M-EST. PATIENT-LVL III: CPT | Mod: PBBFAC,,,

## 2023-12-26 PROCEDURE — 99999 PR PBB SHADOW E&M-EST. PATIENT-LVL III: ICD-10-PCS | Mod: PBBFAC,,,

## 2023-12-26 PROCEDURE — 0503F POSTPARTUM CARE VISIT: CPT | Mod: CPTII,S$GLB,,

## 2023-12-26 RX ORDER — FOLIC ACID 1 MG/1
4000 TABLET ORAL
COMMUNITY
Start: 2023-11-21 | End: 2024-01-22

## 2023-12-26 NOTE — PROGRESS NOTES
"CC:  Post partum visit     Subjective:     Marilu Frausto is a 43 y.o.  who presents for a two week post-operative follow up after a  delivery secondary to repeat c/s w/ BTL Today she denies nausea or vomiting. Pain has been well controlled. Denies redness or drainage from the incision. Using restroom without issues. Denies symptoms of postpartum depression. PPDS: 2. Overall doing well.   Reports baby is doing well overall. Formula Feeding.      Review the Delivery Report for details.     Objective:     Vitals:  /80   Ht 5' 1" (1.549 m)   Wt 69.3 kg (152 lb 12.5 oz)   LMP 03/15/2023 (Exact Date)   Breastfeeding No   BMI 28.87 kg/m²     General:   Alert and cooperative   Abdomen:  Abdomen is soft, non distended, non-tender.    Incision:  Clean, healing well. No erythema or drainage.     Skin:  Warm and dry, bilateral trace edema.      Assessment/Plan:     Postpartum Care  - S/p  delivery - 2 weeks post-op   - Baby overall doing well  - Patient endorses bottle feeding without difficulty  - Minimal bleeding  - No intercourse since delivery  - No s/sx postpartum depression, good support at home  - Incision site CDI. Continue daily wound care.  - Can gradually resume normal activities.    Follow up in 4 weeks for 6 week PP visit.    Eliane Hirsch (Maggie), OMER  Obstetrics and Gynecology  [facility]   "

## 2024-01-22 ENCOUNTER — POSTPARTUM VISIT (OUTPATIENT)
Dept: OBSTETRICS AND GYNECOLOGY | Facility: CLINIC | Age: 44
End: 2024-01-22
Payer: COMMERCIAL

## 2024-01-22 VITALS
DIASTOLIC BLOOD PRESSURE: 78 MMHG | HEIGHT: 61 IN | BODY MASS INDEX: 29.05 KG/M2 | WEIGHT: 153.88 LBS | SYSTOLIC BLOOD PRESSURE: 108 MMHG

## 2024-01-22 PROCEDURE — 99999 PR PBB SHADOW E&M-EST. PATIENT-LVL III: CPT | Mod: PBBFAC,,, | Performed by: STUDENT IN AN ORGANIZED HEALTH CARE EDUCATION/TRAINING PROGRAM

## 2024-01-22 PROCEDURE — 0503F POSTPARTUM CARE VISIT: CPT | Mod: S$GLB,,, | Performed by: STUDENT IN AN ORGANIZED HEALTH CARE EDUCATION/TRAINING PROGRAM

## 2024-01-22 NOTE — PROGRESS NOTES
"Postpartum Visit    Subjective:      Marilu Frausto is a 43 y.o.  who presents for a postpartum visit.  She is status post  repeat  delivery/BTL 6 weeks ago.  Her hospitalization was not complicated. Pregnancy complicated by AMA >40, h/o c/s x 2, h/o LEEP, epilepsy (tegretol 600BID), lap appy at 12wks, PEP.  She is not breastfeeding.  She is s/p bilateral tubal ligation for contraception. Post-op pain resolved, still having numbness/tingling around incision. She denies signs and symptoms of postpartum depression. Baby girl is doing great! They both recovered from COVID last week. No complaints or other concerns today.    Her last pap: pt reports normal 2 yrs ago     Review the Delivery Report for details.     Objective:     /78 (BP Location: Left arm, Patient Position: Sitting)   Ht 5' 1" (1.549 m)   Wt 69.8 kg (153 lb 14.1 oz)   LMP 03/15/2023 (Exact Date)   Breastfeeding No   BMI 29.08 kg/m²     General: healthy, alert, no distress, smiling  Abdomen: Normal, benign. Pfann well healed.  Pelvic Exam: Deferred.    Assessment:     Encounter for postpartum visit        Plan:     1. Return to clinic in 3-4 months for annual exam    Aleyda Méndez MD  Obstetrics and Gynecology  [facility]      "

## 2024-05-13 PROBLEM — Z3A.12 12 WEEKS GESTATION OF PREGNANCY: Status: RESOLVED | Noted: 2023-06-04 | Resolved: 2024-05-13

## 2024-05-20 ENCOUNTER — OFFICE VISIT (OUTPATIENT)
Dept: OBSTETRICS AND GYNECOLOGY | Facility: CLINIC | Age: 44
End: 2024-05-20
Payer: COMMERCIAL

## 2024-05-20 VITALS
DIASTOLIC BLOOD PRESSURE: 62 MMHG | SYSTOLIC BLOOD PRESSURE: 96 MMHG | BODY MASS INDEX: 30.47 KG/M2 | WEIGHT: 161.38 LBS | HEIGHT: 61 IN

## 2024-05-20 DIAGNOSIS — Z11.51 ENCOUNTER FOR SCREENING FOR HUMAN PAPILLOMAVIRUS (HPV): ICD-10-CM

## 2024-05-20 DIAGNOSIS — N94.6 DYSMENORRHEA: ICD-10-CM

## 2024-05-20 DIAGNOSIS — Z01.419 ENCOUNTER FOR GYNECOLOGICAL EXAMINATION: Primary | ICD-10-CM

## 2024-05-20 DIAGNOSIS — Z12.4 ENCOUNTER FOR PAPANICOLAOU SMEAR FOR CERVICAL CANCER SCREENING: ICD-10-CM

## 2024-05-20 PROCEDURE — 99999 PR PBB SHADOW E&M-EST. PATIENT-LVL III: CPT | Mod: PBBFAC,,, | Performed by: STUDENT IN AN ORGANIZED HEALTH CARE EDUCATION/TRAINING PROGRAM

## 2024-05-20 PROCEDURE — 99396 PREV VISIT EST AGE 40-64: CPT | Mod: S$GLB,,, | Performed by: STUDENT IN AN ORGANIZED HEALTH CARE EDUCATION/TRAINING PROGRAM

## 2024-05-20 PROCEDURE — 87624 HPV HI-RISK TYP POOLED RSLT: CPT | Performed by: STUDENT IN AN ORGANIZED HEALTH CARE EDUCATION/TRAINING PROGRAM

## 2024-05-20 PROCEDURE — 88175 CYTOPATH C/V AUTO FLUID REDO: CPT | Performed by: STUDENT IN AN ORGANIZED HEALTH CARE EDUCATION/TRAINING PROGRAM

## 2024-05-20 RX ORDER — NAPROXEN SODIUM 550 MG/1
550 TABLET ORAL 2 TIMES DAILY WITH MEALS
Qty: 30 TABLET | Refills: 2 | Status: SHIPPED | OUTPATIENT
Start: 2024-05-20

## 2024-05-20 NOTE — PROGRESS NOTES
Chief Complaint: Well Woman Exam     HPI:      Marilu Frausto is a 43 y.o.  who presents today for well woman exam.  Regular cycles. LMP: Patient's last menstrual period was 2024 (approximate).    Today patient's GYN complaints include:  numbness at incision site, painful cycles, hair thinning .  Specifically, patient denies abnormal vaginal bleeding, discharge, pelvic pain, urinary problems.    Ms. Frausto is currently sexually active with a single male partner. She is currently using bilateral tubal ligation for contraception.     Previous Pap: normal per pt   Previous Mammogram: BiRads: 2 on 2024 - obtains with breast center at [facility] due to elevated risk     Past Medical History:   Diagnosis Date    Acute appendicitis 2023    Epilepsy     Migraines        Past Surgical History:   Procedure Laterality Date    CERVICAL BIOPSY  W/ LOOP ELECTRODE EXCISION       SECTION      c/s x 2     SECTION WITH TUBAL LIGATION N/A 2023    Procedure:  SECTION, WITH TUBAL LIGATION;  Surgeon: Aleyda Méndez MD;  Location: [facility] L&D;  Service: OB/GYN;  Laterality: N/A;    LAPAROSCOPIC APPENDECTOMY N/A 2023    Procedure: APPENDECTOMY, LAPAROSCOPIC;  Surgeon: Brandee Arriaza MD;  Location: [facility] OR;  Service: General;  Laterality: N/A;    TUBAL LIGATION  23       Social History     Socioeconomic History    Marital status:    Tobacco Use    Smoking status: Former     Current packs/day: 0.00     Average packs/day: 1 pack/day for 10.0 years (10.0 ttl pk-yrs)     Types: Cigarettes     Quit date: 2021     Years since quittin.4    Smokeless tobacco: Never   Substance and Sexual Activity    Alcohol use: Yes     Alcohol/week: 2.0 standard drinks of alcohol     Types: 2 Glasses of wine per week    Drug use: Never    Sexual activity: Yes     Partners: Male     Birth control/protection: Other-see comments, None     Comment: Tubes tied     Family History  "  Problem Relation Name Age of Onset    Hypertension Father Pb     Ovarian cancer Mother Argelia     Breast cancer Mother Argelia        Review of patient's allergies indicates:   Allergen Reactions    Coconut Hives       OB History          3    Para   3    Term   3            AB        Living   3         SAB        IAB        Ectopic        Multiple   0    Live Births   3                 Physical Exam:      PHYSICAL EXAM:  BP 96/62   Ht 5' 1" (1.549 m)   Wt 73.2 kg (161 lb 6 oz)   LMP 2024 (Approximate)   Breastfeeding No   BMI 30.49 kg/m²   Body mass index is 30.49 kg/m².     APPEARANCE: Well nourished, well developed, in no acute distress.  PSYCH: Appropriate mood and affect.  SKIN: No acne or hirsutism  NECK: Neck symmetric without masses  NODES: No inguinal, axillary, or supraclavicular lymph node enlargement  ABDOMEN: Soft.  No tenderness or masses.    CARDIOVASCULAR: No edema of peripheral extremities  BREASTS: Symmetrical, no visible skin lesions. No palpable masses. No nipple discharge bilaterally.  PELVIC: Normal external genitalia without lesions.  Normal hair distribution.  Adequate perineal body, normal urethral meatus.  Vagina moist and well rugated. Without lesions. withoutdischarge.  Cervix pink, without lesions, discharge or tenderness.  No significant cystocele or rectocele.  Bimanual exam shows uterus to be normal size, regular, mobile and nontender.  Adnexa without masses or tenderness.      Assessment/Plan:     Encounter for gynecological examination    Encounter for Papanicolaou smear for cervical cancer screening  -     Liquid-Based Pap Smear, Screening    Encounter for screening for human papillomavirus (HPV)  -     HPV High Risk Genotypes, PCR    Dysmenorrhea  -     naproxen sodium (ANAPROX) 550 MG tablet; Take 1 tablet (550 mg total) by mouth 2 (two) times daily with meals. As needed for menstrual cramping  Dispense: 30 tablet; Refill: 2      - pelvic exam " normal  - pap/hpv collected  - rx anaprox prn dysmenorrhea  - follows with breast center at [facility] for breast ca screening  - will reach out to Neurologist to check if Viviscal is okay with her epilepsy meds, if so will try this for hair thinning  - RTC 1 yr or sooner PRN      Counseling:     Patient was counseled today on current ASCCP pap guidelines, the recommendation for yearly physical exams, safe driving habits, breast self awareness and annual mammograms. She is to see her PCP for other health maintenance.     Use of the [employer] Patient Portal discussed and encouraged during today's visit.

## 2024-05-23 LAB
HPV HR 12 DNA SPEC QL NAA+PROBE: NEGATIVE
HPV16 AG SPEC QL: NEGATIVE
HPV18 DNA SPEC QL NAA+PROBE: NEGATIVE

## 2024-05-25 LAB
FINAL PATHOLOGIC DIAGNOSIS: NORMAL
Lab: NORMAL

## 2025-04-28 ENCOUNTER — OFFICE VISIT (OUTPATIENT)
Dept: OBSTETRICS AND GYNECOLOGY | Facility: CLINIC | Age: 45
End: 2025-04-28
Payer: COMMERCIAL

## 2025-04-28 VITALS
WEIGHT: 166.88 LBS | SYSTOLIC BLOOD PRESSURE: 110 MMHG | BODY MASS INDEX: 31.51 KG/M2 | HEIGHT: 61 IN | DIASTOLIC BLOOD PRESSURE: 72 MMHG

## 2025-04-28 DIAGNOSIS — N93.9 ABNORMAL UTERINE BLEEDING: Primary | ICD-10-CM

## 2025-04-28 DIAGNOSIS — N84.1 CERVICAL POLYP: ICD-10-CM

## 2025-04-28 PROCEDURE — 88305 TISSUE EXAM BY PATHOLOGIST: CPT | Mod: 26,,, | Performed by: PATHOLOGY

## 2025-04-28 PROCEDURE — 57500 BIOPSY OF CERVIX: CPT | Mod: S$GLB,,, | Performed by: STUDENT IN AN ORGANIZED HEALTH CARE EDUCATION/TRAINING PROGRAM

## 2025-04-28 PROCEDURE — 99213 OFFICE O/P EST LOW 20 MIN: CPT | Mod: 25,S$GLB,, | Performed by: STUDENT IN AN ORGANIZED HEALTH CARE EDUCATION/TRAINING PROGRAM

## 2025-04-28 PROCEDURE — 99999 PR PBB SHADOW E&M-EST. PATIENT-LVL III: CPT | Mod: PBBFAC,,, | Performed by: STUDENT IN AN ORGANIZED HEALTH CARE EDUCATION/TRAINING PROGRAM

## 2025-04-28 PROCEDURE — 88305 TISSUE EXAM BY PATHOLOGIST: CPT | Mod: TC | Performed by: STUDENT IN AN ORGANIZED HEALTH CARE EDUCATION/TRAINING PROGRAM

## 2025-04-28 NOTE — PROGRESS NOTES
"Chief Complaint: AUB     HPI:      Marilu Frausto is a 44 y.o.  who presents today for ***.      Menses are {Desc; regular/irre}. Patient's last menstrual period was 2025 (exact date).      {Blank single:::"has never been sexually active","is not currently sexually active","is currently sexually active with multiple partners","is currently sexually active with a single female partner","is currently sexually active with a single male partner"}. She is currently using {[facility] Contraception List:45691} for contraception.     Physical Exam:      PHYSICAL EXAM:  /72   Ht 5' 1" (1.549 m)   Wt 75.7 kg (166 lb 14.2 oz)   LMP 2025 (Exact Date)   Breastfeeding No   BMI 31.53 kg/m²   Body mass index is 31.53 kg/m².     APPEARANCE: Well nourished, well developed, in no acute distress.  PELVIC:  External genitalia and urethra within normal limits. Vagina {WITH-WITHOUT:12577} lesions, {WITH-WITHOUT:92013} discharge, {WITH-WITHOUT:89812} erythema, {WITH-WITHOUT:39439} ulcers.  Cervix {WITH-WITHOUT:57166} cervical motion tenderness, {Blank single:::"non-friable","friable"}. Uterus: [facility]"normal size","mobile","non-tender"}.  No adnexal masses or tenderness palpated.    Results:         Assessment/Plan:     Abnormal uterine bleeding  -     Cancel: US OB/GYN Procedure ([facility]) - Extended List; Future  -     CBC Auto Differential; Future  -     Comprehensive Metabolic Panel; Future; Expected date: 2025  -     TSH; Future; Expected date: 2025  -     Prolactin; Future  -     Estradiol; Future  -     Follicle Stimulating Hormone; Future; Expected date: 2025  -     Hemoglobin A1C; Future; Expected date: 2025    Cervical polyp  -     Specimen to Pathology Gynecology and Obstetrics; Future; Expected date: 2025        No follow-ups on file.    " NON-OB (xpd; Future; Expected date: 04/28/2025  -     CBC Auto Differential; Future  -     Comprehensive Metabolic Panel; Future; Expected date: 04/28/2025  -     TSH; Future; Expected date: 04/28/2025  -     Prolactin; Future  -     Estradiol; Future  -     Follicle Stimulating Hormone; Future; Expected date: 04/28/2025  -     Hemoglobin A1C; Future; Expected date: 04/28/2025    Cervical polyp  -     Cancel: Specimen to Pathology Gynecology and Obstetrics; Future; Expected date: 04/28/2025  -     Specimen to Pathology Gynecology and Obstetrics      - pelvic exam w/ cervical polyp, otherwise unremarkable  - cervical polyp removed, see proc note  - AUB labs today  - pelvic US ordered to r/o structural etiology  - discussed differential dx -- if polyp was contributing, bleeding pattern should improve  - rtc July for annual and will follow up on AUB sxs at that time         [1]   Current Outpatient Medications on File Prior to Visit   Medication Sig Dispense Refill    butalbital-acetaminophen-caff -40 mg Cap Take 1 capsule by mouth daily as needed.      propranoloL (INDERAL LA) 120 MG 24 hr capsule Take 120 mg by mouth every morning.      TEGRETOL  mg 12 hr tablet TK 3 TS PO BID  6    naproxen sodium (ANAPROX) 550 MG tablet Take 1 tablet (550 mg total) by mouth 2 (two) times daily with meals. As needed for menstrual cramping (Patient not taking: Reported on 4/28/2025) 30 tablet 2     No current facility-administered medications on file prior to visit.

## 2025-05-01 ENCOUNTER — RESULTS FOLLOW-UP (OUTPATIENT)
Dept: OBSTETRICS AND GYNECOLOGY | Facility: CLINIC | Age: 45
End: 2025-05-01

## 2025-05-01 LAB
ESTROGEN SERPL-MCNC: NORMAL PG/ML
INSULIN SERPL-ACNC: NORMAL U[IU]/ML
LAB AP DIAGNOSIS CATEGORY: NORMAL
LAB AP GROSS DESCRIPTION: NORMAL
LAB AP PERFORMING LOCATION(S): NORMAL
LAB AP REPORT FOOTNOTES: NORMAL

## 2025-05-05 NOTE — PROCEDURES
Cervical polyp removal    Date/Time: 4/28/2025 3:30 PM    Performed by: Aleyda Méndez MD  Authorized by: Aleyda Méndez MD    Consent obtained:  Prior to procedure the appropriate consent was completed and verified  Local anesthesia used?: No      Biopsy Location:  Cervix  Cervix:     : polyp - grasped with ring forceps and twisted from stalk until removed, silver nitrate placed at base with great hemostasis.  Estimated blood loss (cc):  2   Patient tolerated the procedure well with no immediate complications.

## 2025-05-27 ENCOUNTER — OFFICE VISIT (OUTPATIENT)
Dept: URGENT CARE | Facility: CLINIC | Age: 45
End: 2025-05-27
Payer: COMMERCIAL

## 2025-05-27 ENCOUNTER — RESULTS FOLLOW-UP (OUTPATIENT)
Dept: URGENT CARE | Facility: CLINIC | Age: 45
End: 2025-05-27

## 2025-05-27 VITALS
HEIGHT: 61 IN | BODY MASS INDEX: 31.23 KG/M2 | OXYGEN SATURATION: 98 % | TEMPERATURE: 98 F | SYSTOLIC BLOOD PRESSURE: 122 MMHG | HEART RATE: 72 BPM | WEIGHT: 165.38 LBS | DIASTOLIC BLOOD PRESSURE: 84 MMHG | RESPIRATION RATE: 16 BRPM

## 2025-05-27 DIAGNOSIS — S99.921A INJURY OF RIGHT FOOT, INITIAL ENCOUNTER: ICD-10-CM

## 2025-05-27 DIAGNOSIS — S93.601A SPRAIN OF RIGHT FOOT, INITIAL ENCOUNTER: ICD-10-CM

## 2025-05-27 DIAGNOSIS — J40 BRONCHITIS: Primary | ICD-10-CM

## 2025-05-27 PROCEDURE — 73630 X-RAY EXAM OF FOOT: CPT | Mod: FY,RT,S$GLB, | Performed by: RADIOLOGY

## 2025-05-27 PROCEDURE — 99214 OFFICE O/P EST MOD 30 MIN: CPT | Mod: S$GLB,,,

## 2025-05-27 RX ORDER — BENZONATATE 200 MG/1
200 CAPSULE ORAL 3 TIMES DAILY PRN
Qty: 30 CAPSULE | Refills: 0 | Status: SHIPPED | OUTPATIENT
Start: 2025-05-27

## 2025-05-27 RX ORDER — PREDNISONE 20 MG/1
20 TABLET ORAL DAILY
Qty: 5 TABLET | Refills: 0 | Status: SHIPPED | OUTPATIENT
Start: 2025-05-27 | End: 2025-06-01

## 2025-05-27 RX ORDER — ALBUTEROL SULFATE 90 UG/1
2 INHALANT RESPIRATORY (INHALATION) EVERY 4 HOURS PRN
Qty: 18 G | Refills: 0 | Status: SHIPPED | OUTPATIENT
Start: 2025-05-27

## 2025-05-27 NOTE — PROGRESS NOTES
"Subjective:      Patient ID: Marilu Frausto is a 44 y.o. female.    Vitals:  height is 5' 1" (1.549 m) and weight is 75 kg (165 lb 5.5 oz). Her oral temperature is 98 °F (36.7 °C). Her blood pressure is 122/84 and her pulse is 72. Her respiration is 16 and oxygen saturation is 98%.     Chief Complaint: Cough and Foot Injury    Pt present with cough, wheezing. Sx started few days ago. Pt states she was exposed  to dust at the camp, wants to get it check.   Pt also injured her RT foot pain from fall happened 3 days ago. Tx include ibuprofen with no relief.     Provider note starts below:  Patient presents to clinic for evaluation of two complaints. First complaint is cough, wheezing, shortness of breath which onset 3 days ago. She stayed at her family's camp this weekend which is currently undergoing construction. Patient states there was lots of dust in the camp and concerned that breathing it in caused her symptoms. States she otherwise does not feel "sick". Denies fever, chills, fatigue, URI symptoms, headache, body aches. Second complaint is right foot pain which onset 3 days ago as well. Patient was running with her child in the house and inverted her right ankle. She has had pain to the outside of her right foot since then. She has been taking ibuprofen and wearing ankle brace which provides some improvement. Patient denies any pain at rest, only when bearing weight.     Cough  This is a new problem. The current episode started in the past 7 days. The problem has been gradually worsening. The problem occurs constantly. Associated symptoms include shortness of breath and wheezing. Pertinent negatives include no chest pain, chills, ear pain, eye redness, fever, headaches, hemoptysis, myalgias, postnasal drip, rash or sore throat. Nothing aggravates the symptoms. The treatment provided no relief.   Foot Injury   The incident occurred 2 days ago. The incident occurred at home. The injury mechanism was a fall. " The pain is present in the right foot. The quality of the pain is described as aching. The pain is at a severity of 8/10. The pain is severe. The pain has been Constant since onset. Pertinent negatives include no numbness. The symptoms are aggravated by movement. She has tried NSAIDs for the symptoms. The treatment provided no relief.     Constitution: Negative for chills, fatigue and fever.   HENT:  Negative for ear pain, congestion, postnasal drip, sinus pressure and sore throat.    Neck: Negative for neck pain.   Cardiovascular:  Negative for chest pain and palpitations.   Eyes:  Negative for eye itching and eye redness.   Respiratory:  Positive for chest tightness, cough, shortness of breath and wheezing. Negative for sputum production, bloody sputum, stridor and asthma.    Gastrointestinal:  Negative for nausea and vomiting.   Musculoskeletal:  Positive for joint pain (R foot pain). Negative for joint swelling, abnormal ROM of joint, back pain, muscle cramps and muscle ache.   Skin:  Negative for pale and rash.   Allergic/Immunologic: Negative for asthma.   Neurological:  Negative for dizziness, light-headedness, headaches, disorientation, altered mental status, numbness and tingling.   Psychiatric/Behavioral:  Negative for altered mental status, disorientation and confusion.       Objective:     Physical Exam   Constitutional: She is oriented to person, place, and time.  Non-toxic appearance. She does not appear ill. No distress.   HENT:   Head: Normocephalic and atraumatic.   Eyes: Conjunctivae are normal. Extraocular movement intact   Neck: Neck supple.   Cardiovascular: Normal rate, regular rhythm, normal heart sounds and normal pulses.   Pulmonary/Chest: Effort normal. No stridor. No respiratory distress. She has wheezes (diffuse, bilaterally). She has no rhonchi. She has no rales. She exhibits no tenderness.   Abdominal: Normal appearance.   Musculoskeletal: Normal range of motion.         General: Normal  range of motion.        Feet:    Neurological: She is alert, oriented to person, place, and time and at baseline.   Skin: Skin is warm and dry.   Psychiatric: Her behavior is normal. Mood normal.   Nursing note and vitals reviewed.      Assessment:     XR Foot: reviewed, no obvious fracture, awaiting radiology report    1. Bronchitis    2. Sprain of right foot, initial encounter    3. Injury of right foot, initial encounter        Plan:     Bronchitis  -     benzonatate (TESSALON) 200 MG capsule; Take 1 capsule (200 mg total) by mouth 3 (three) times daily as needed for Cough.  Dispense: 30 capsule; Refill: 0  -     albuterol (VENTOLIN HFA) 90 mcg/actuation inhaler; Inhale 2 puffs into the lungs every 4 (four) hours as needed for Wheezing or Shortness of Breath. Rescue  Dispense: 18 g; Refill: 0  -     predniSONE (DELTASONE) 20 MG tablet; Take 1 tablet (20 mg total) by mouth once daily. for 5 days  Dispense: 5 tablet; Refill: 0    Sprain of right foot, initial encounter    Injury of right foot, initial encounter  -     XR FOOT COMPLETE 3 VIEW RIGHT; Future; Expected date: 05/27/2025            Patient Instructions   Right Foot Pain  If not allergic, please take over the counter Tylenol (Acetaminophen) 650 mg every 6 to 8 hours and/or Motrin (Ibuprofen) 600 mg to 800 every 6-8 hours as directed for control of pain and/or fever.  RICE - Rest, ice, compression and elevation to the affected joint or limb as needed.  Rest your ankle. You can use crutches to help keep the weight off your foot if needed.  Place an ice pack or a bag of frozen vegetables wrapped in a towel over the painful part. Never put ice right on the skin. Use ice every 1 to 2 hours for 10 to 15 minutes at a time. Use for the first 24 to 48 hours after your injury.  Wrap your ankle with an elastic bandage to help with swelling.  Prop your ankle on pillows, keeping your foot above the level of your heart. This may help lessen pain and swelling.  In a few  days, when you have less swelling and pain, you can start to gently stretch your ankle.    Bronchitis  Make breathing easier: albuterol inhaler/nebulizer every 4 hours as needed  Control coughing: benzonatate up to 3 times daily as needed  Lower swelling in your airways: prednisone 20 mg daily for 5 days.   Adverse side effects of recurrent/long term oral steroid use: elevated blood pressure elevated blood sugar, pancreatitis,glaucoma/cataracts, weight gain in face/abdomen/neck, round face (moon face), fluid retention in legs/lungs, mood swings, upset stomach, increased risk of infections, osteoporosis and increased risk of fractures, fatigue, loss of appetite, nausea and muscle weakness, thin skin, bruising and slower wound healing.   Do not smoke or be in smoke-filled places. Avoid other things that may cause breathing problems like fumes, pollution, dust, and other common allergens.  Drink lots of water, juice, or broth to replace fluids lost in runny nose and fever.  If you have medicines to take when you are feeling short of breath, be sure to carry them with you. Then, you can take them when needed.  If you smoke, stop.  Take warm, steamy showers to help soothe the cough.  Use a cool mist humidifier. This may make it easier to breathe.  Use hard candy or cough drops to soothe sore throat and cough.  Wash your hands often. This will help prevent you from spreading germs to others.    Return to clinic or go to the emergency department if  Signs of infection. These include a fever of 100.4°F (38°C) or higher, chills, cough, more sputum or change in color of sputum.  You are having so much trouble breathing that you can only say one or two words at a time.  You need to sit upright at all times to be able to breathe and or cannot lie down.  You have trouble breathing when talking or sitting still.  You have a fever of 100.4°F (38°C) or higher or chills.  You have chest pain when you cough, have trouble breathing  but can still talk in full sentences, or cough up blood.  You develop a barking cough.  You are still coughing in 3 weeks.    Please remember that you have received care at an urgent care today. Urgent cares are not emergency rooms and are not equipped to handle life threatening emergencies and cannot rule in or out certain medical conditions and you may be released before all of your medical problems are known or treated.      Please arrange follow up with your primary care physician or speciality clinic within 2-5 days if your signs and symptoms have not resolved or worsen.      Patient can call our Referral Hotline at [phone] to make an appointment.

## 2025-05-27 NOTE — PATIENT INSTRUCTIONS
Right Foot Pain  If not allergic, please take over the counter Tylenol (Acetaminophen) 650 mg every 6 to 8 hours and/or Motrin (Ibuprofen) 600 mg to 800 every 6-8 hours as directed for control of pain and/or fever.  RICE - Rest, ice, compression and elevation to the affected joint or limb as needed.  Rest your ankle. You can use crutches to help keep the weight off your foot if needed.  Place an ice pack or a bag of frozen vegetables wrapped in a towel over the painful part. Never put ice right on the skin. Use ice every 1 to 2 hours for 10 to 15 minutes at a time. Use for the first 24 to 48 hours after your injury.  Wrap your ankle with an elastic bandage to help with swelling.  Prop your ankle on pillows, keeping your foot above the level of your heart. This may help lessen pain and swelling.  In a few days, when you have less swelling and pain, you can start to gently stretch your ankle.    Bronchitis  Make breathing easier: albuterol inhaler/nebulizer every 4 hours as needed  Control coughing: benzonatate up to 3 times daily as needed  Lower swelling in your airways: prednisone 20 mg daily for 5 days.   Adverse side effects of recurrent/long term oral steroid use: elevated blood pressure elevated blood sugar, pancreatitis,glaucoma/cataracts, weight gain in face/abdomen/neck, round face (moon face), fluid retention in legs/lungs, mood swings, upset stomach, increased risk of infections, osteoporosis and increased risk of fractures, fatigue, loss of appetite, nausea and muscle weakness, thin skin, bruising and slower wound healing.   Do not smoke or be in smoke-filled places. Avoid other things that may cause breathing problems like fumes, pollution, dust, and other common allergens.  Drink lots of water, juice, or broth to replace fluids lost in runny nose and fever.  If you have medicines to take when you are feeling short of breath, be sure to carry them with you. Then, you can take them when needed.  If you  smoke, stop.  Take warm, steamy showers to help soothe the cough.  Use a cool mist humidifier. This may make it easier to breathe.  Use hard candy or cough drops to soothe sore throat and cough.  Wash your hands often. This will help prevent you from spreading germs to others.    Return to clinic or go to the emergency department if  Signs of infection. These include a fever of 100.4°F (38°C) or higher, chills, cough, more sputum or change in color of sputum.  You are having so much trouble breathing that you can only say one or two words at a time.  You need to sit upright at all times to be able to breathe and or cannot lie down.  You have trouble breathing when talking or sitting still.  You have a fever of 100.4°F (38°C) or higher or chills.  You have chest pain when you cough, have trouble breathing but can still talk in full sentences, or cough up blood.  You develop a barking cough.  You are still coughing in 3 weeks.    Please remember that you have received care at an urgent care today. Urgent cares are not emergency rooms and are not equipped to handle life threatening emergencies and cannot rule in or out certain medical conditions and you may be released before all of your medical problems are known or treated.      Please arrange follow up with your primary care physician or speciality clinic within 2-5 days if your signs and symptoms have not resolved or worsen.      Patient can call our Referral Hotline at [phone] to make an appointment.

## 2025-08-05 ENCOUNTER — OFFICE VISIT (OUTPATIENT)
Dept: OBSTETRICS AND GYNECOLOGY | Facility: CLINIC | Age: 45
End: 2025-08-05
Payer: COMMERCIAL

## 2025-08-05 VITALS
BODY MASS INDEX: 30.47 KG/M2 | WEIGHT: 161.38 LBS | HEIGHT: 61 IN | SYSTOLIC BLOOD PRESSURE: 118 MMHG | DIASTOLIC BLOOD PRESSURE: 82 MMHG

## 2025-08-05 DIAGNOSIS — Z01.419 ENCOUNTER FOR GYNECOLOGICAL EXAMINATION: Primary | ICD-10-CM

## 2025-08-05 PROCEDURE — 99396 PREV VISIT EST AGE 40-64: CPT | Mod: S$GLB,,, | Performed by: STUDENT IN AN ORGANIZED HEALTH CARE EDUCATION/TRAINING PROGRAM

## 2025-08-05 PROCEDURE — 99999 PR PBB SHADOW E&M-EST. PATIENT-LVL III: CPT | Mod: PBBFAC,,, | Performed by: STUDENT IN AN ORGANIZED HEALTH CARE EDUCATION/TRAINING PROGRAM

## 2025-08-06 NOTE — PROGRESS NOTES
"  Chief Complaint: Well Woman Exam     HPI:      Marilu Frausto is a 45 y.o.  who presents today for well woman exam.  LMP: Patient's last menstrual period was 2025 (approximate).    Today patient's GYN complaints include: none.  Specifically, patient denies abnormal vaginal bleeding, discharge, pelvic pain, urinary problems.    Ms. Frausto is currently sexually active with a single male partner. She is currently using bilateral tubal ligation for contraception.     Previous Pap: NILM, HPV negative (2024)  Previous Mammogram: BiRads: 2 T-C Score: 43.8 (2025) - [place] [facility] [place]      Past Medical History:   Diagnosis Date    Acute appendicitis 2023    Epilepsy     Migraines        Past Surgical History:   Procedure Laterality Date    CERVICAL BIOPSY  W/ LOOP ELECTRODE EXCISION       SECTION      c/s x 2     SECTION WITH TUBAL LIGATION N/A 2023    Procedure:  SECTION, WITH TUBAL LIGATION;  Surgeon: Aleyda Méndez MD;  Location: [facility] L&D;  Service: OB/GYN;  Laterality: N/A;    LAPAROSCOPIC APPENDECTOMY N/A 2023    Procedure: APPENDECTOMY, LAPAROSCOPIC;  Surgeon: Brandee Arriaza MD;  Location: [facility] OR;  Service: General;  Laterality: N/A;    TUBAL LIGATION  23       Social History[1]    Family History   Problem Relation Name Age of Onset    Hypertension Father Pb     Ovarian cancer Mother Argelia     Breast cancer Mother Argelia        Review of patient's allergies indicates:   Allergen Reactions    Coconut Hives       OB History          3    Para   3    Term   3            AB        Living   3         SAB        IAB        Ectopic        Multiple   0    Live Births   3                 Physical Exam:      PHYSICAL EXAM:  /82 (Patient Position: Sitting)   Ht 5' 1" (1.549 m)   Wt 73.2 kg (161 lb 6 oz)   LMP 2025 (Approximate)   BMI 30.49 kg/m²   Body mass index is 30.49 kg/m².     APPEARANCE: Well " nourished, well developed, in no acute distress.  PSYCH: Appropriate mood and affect.  SKIN: No acne or hirsutism  NECK: Neck symmetric without masses  NODES: No inguinal, axillary, or supraclavicular lymph node enlargement  ABDOMEN: Soft.  No tenderness or masses.    CARDIOVASCULAR: No edema of peripheral extremities  BREASTS: Symmetrical, no visible skin lesions. No palpable masses. No nipple discharge bilaterally.  PELVIC: Normal external genitalia without lesions.  Normal hair distribution.  Adequate perineal body, normal urethral meatus.  Vagina moist and smooth. Without lesions. withoutdischarge.  Cervix pink, without lesions, discharge or tenderness.  No significant cystocele or rectocele.  Bimanual exam shows uterus to be normal size, regular, mobile and nontender.  Adnexa without masses or tenderness.      Assessment/Plan:     Encounter for gynecological examination      - pelvic exam normal  - pap/hpv due and collected  - MMG/MRI q6 month - orders with [facility]  - other preventative care with PCP  - RTC 1 yr       Counseling:     Patient was counseled today on current ASCCP pap guidelines, the recommendation for yearly physical exams, safe driving habits, breast self awareness and annual mammograms. She is to see her PCP for other health maintenance.     Use of the [facility] Patient Portal discussed and encouraged during today's visit.        [1]   Social History  Socioeconomic History    Marital status:    Tobacco Use    Smoking status: Former     Current packs/day: 0.00     Average packs/day: 1 pack/day for 10.0 years (10.0 ttl pk-yrs)     Types: Cigarettes     Quit date: 12/13/2021     Years since quitting: 3.6    Smokeless tobacco: Never   Substance and Sexual Activity    Alcohol use: Yes     Alcohol/week: 2.0 standard drinks of alcohol     Types: 2 Glasses of wine per week    Drug use: Never    Sexual activity: Yes     Partners: Male     Birth control/protection: Other-see comments,  None     Comment: Tubes tied     Social Drivers of Health     Financial Resource Strain: Low Risk  (12/11/2023)    Overall Financial Resource Strain (CARDIA)     Difficulty of Paying Living Expenses: Not very hard   Food Insecurity: No Food Insecurity (12/11/2023)    Hunger Vital Sign     Worried About Running Out of Food in the Last Year: Never true     Ran Out of Food in the Last Year: Never true   Transportation Needs: No Transportation Needs (12/11/2023)    PRAPARE - Transportation     Lack of Transportation (Medical): No     Lack of Transportation (Non-Medical): No   Physical Activity: Inactive (12/7/2023)    Received from [facility]    Exercise Vital Sign     Days of Exercise per Week: 0 days     Minutes of Exercise per Session: 0 min   Stress: No Stress Concern Present (12/11/2023)    [employer] - Occupational Stress Questionnaire     Feeling of Stress : Not at all   Housing Stability: Low Risk  (12/11/2023)    Housing Stability Vital Sign     Unable to Pay for Housing in the Last Year: No     Number of Places Lived in the Last Year: 1     Unstable Housing in the Last Year: No

## (undated) DEVICE — SEALER LIGASURE MARYLAND 37CM

## (undated) DEVICE — SYS SEE SHARP SCP ANTIFG LNG

## (undated) DEVICE — TRAY CATH FOL SIL URIMTR 16FR

## (undated) DEVICE — PORT AIRSEAL 12/120MM LPI

## (undated) DEVICE — BAG TISSUE RETRIEVAL 225ML

## (undated) DEVICE — KIT AIRSEAL BIFURCT FLTR TB

## (undated) DEVICE — ELECTRODE REM PLYHSV RETURN 9

## (undated) DEVICE — ADHESIVE DERMABOND ADVANCED

## (undated) DEVICE — SYR 10CC LUER LOCK

## (undated) DEVICE — Device

## (undated) DEVICE — IRRIGATOR ENDOSCOPY DISP.

## (undated) DEVICE — CANNULA ENDOPATH XCEL 5X100MM

## (undated) DEVICE — STAPLER INT LINEAR ARTC 3.5-45

## (undated) DEVICE — KIT PROCEDURE STER INLET CLOSU

## (undated) DEVICE — NDL HYPO REG 25G X 1 1/2

## (undated) DEVICE — CART STAPLE FLEX ETX 3.5MM BLU

## (undated) DEVICE — KIT WING PAD POSITIONING

## (undated) DEVICE — TROCAR ENDOPATH XCEL 5X100MM

## (undated) DEVICE — SUT 0 VICRYL / UR6 (J603)

## (undated) DEVICE — SUT VICRYL 4-0 27 SH

## (undated) DEVICE — GLOVE BIOGEL 7.0

## (undated) DEVICE — PENCIL ELECTROSURG HOLST W/BLD

## (undated) DEVICE — SUT MCRYL PLUS 4-0 PS2 27IN

## (undated) DEVICE — HEMOSTAT SURGICEL PWD 3G